# Patient Record
Sex: FEMALE | Race: BLACK OR AFRICAN AMERICAN | NOT HISPANIC OR LATINO
[De-identification: names, ages, dates, MRNs, and addresses within clinical notes are randomized per-mention and may not be internally consistent; named-entity substitution may affect disease eponyms.]

---

## 2018-10-05 ENCOUNTER — APPOINTMENT (OUTPATIENT)
Dept: UROGYNECOLOGY | Facility: CLINIC | Age: 43
End: 2018-10-05
Payer: COMMERCIAL

## 2018-10-05 VITALS — HEART RATE: 74 BPM | WEIGHT: 204 LBS | HEIGHT: 70 IN | BODY MASS INDEX: 29.2 KG/M2

## 2018-10-05 DIAGNOSIS — N39.41 URGE INCONTINENCE: ICD-10-CM

## 2018-10-05 DIAGNOSIS — N39.46 MIXED INCONTINENCE: ICD-10-CM

## 2018-10-05 DIAGNOSIS — Z81.8 FAMILY HISTORY OF OTHER MENTAL AND BEHAVIORAL DISORDERS: ICD-10-CM

## 2018-10-05 DIAGNOSIS — L98.8 OTHER SPECIFIED DISORDERS OF THE SKIN AND SUBCUTANEOUS TISSUE: ICD-10-CM

## 2018-10-05 DIAGNOSIS — N81.89 OTHER FEMALE GENITAL PROLAPSE: ICD-10-CM

## 2018-10-05 DIAGNOSIS — R15.9 FULL INCONTINENCE OF FECES: ICD-10-CM

## 2018-10-05 DIAGNOSIS — N39.3 STRESS INCONTINENCE (FEMALE) (MALE): ICD-10-CM

## 2018-10-05 LAB
BILIRUB UR QL STRIP: NORMAL
CLARITY UR: CLEAR
COLLECTION METHOD: NORMAL
GLUCOSE UR-MCNC: NORMAL
HCG UR QL: 0.2 EU/DL
HGB UR QL STRIP.AUTO: NORMAL
KETONES UR-MCNC: NORMAL
LEUKOCYTE ESTERASE UR QL STRIP: NORMAL
NITRITE UR QL STRIP: NORMAL
PH UR STRIP: 6
PROT UR STRIP-MCNC: NORMAL
SP GR UR STRIP: 1.02

## 2018-10-05 PROCEDURE — 99204 OFFICE O/P NEW MOD 45 MIN: CPT | Mod: 25

## 2018-10-05 PROCEDURE — 51701 INSERT BLADDER CATHETER: CPT

## 2018-10-08 LAB — BACTERIA UR CULT: ABNORMAL

## 2019-01-09 ENCOUNTER — APPOINTMENT (OUTPATIENT)
Dept: UROGYNECOLOGY | Facility: CLINIC | Age: 44
End: 2019-01-09

## 2019-03-06 ENCOUNTER — APPOINTMENT (OUTPATIENT)
Dept: UROGYNECOLOGY | Facility: CLINIC | Age: 44
End: 2019-03-06

## 2019-05-07 ENCOUNTER — TRANSCRIPTION ENCOUNTER (OUTPATIENT)
Age: 44
End: 2019-05-07

## 2019-05-08 ENCOUNTER — APPOINTMENT (OUTPATIENT)
Dept: ORTHOPEDIC SURGERY | Facility: CLINIC | Age: 44
End: 2019-05-08
Payer: COMMERCIAL

## 2019-05-08 VITALS — HEIGHT: 70 IN | BODY MASS INDEX: 30.78 KG/M2 | WEIGHT: 215 LBS

## 2019-05-08 PROCEDURE — 99204 OFFICE O/P NEW MOD 45 MIN: CPT

## 2019-05-08 NOTE — DISCUSSION/SUMMARY
[de-identified] : Treatment options were discussed for the patient's right ankle condition(s) with the patient. The patient agrees to proceed with the present form of treatment. The patient had an opportunity to ask their questions and they were answered to the best of my ability. The risks, complications, benefits and alternatives of proceeding and not proceeding with the proposed treatment plan were discussed.\par The risks, benefits, complications and alternatives of the medication(s) prescribed and/or administered were extensively discussed.\par Cam boot 3 wks\par ice\par The patient was told that if the symptoms/problem returned, did not improve or worsened then to come back and see me.\par

## 2019-05-08 NOTE — PHYSICAL EXAM
[de-identified] : Right ankle/foot examination showed no evidence of warmth or swelling. There was tenderness on palpation laterally. There was a negative anterior drawer test. There was 5/5 strength in dorsiflexion, plantarflexion, inversion and eversion. Active range of motion showed dorsiflexion 20°, plantar flexion 50°, inversion 35° and eversion and 15°.\par \par  [de-identified] : Urgent care x-rays from 5/7/2019 showed no evidence of fracture or dislocation

## 2019-05-08 NOTE — HISTORY OF PRESENT ILLNESS
[FreeTextEntry1] : Location: right ankle\par Quality: Sharp\par Duration:4/15/2019\par Context: Stepped incorrectly and twisted ankle\par Aggravating Factors: walking,weightbearing, getting out of bed, ROM\par Alleviating Factors: Ace bandage, rest\par Associated Symptoms: swelling radiating up leg\par Prior Studies:xray 5/7/2019\par

## 2019-06-10 ENCOUNTER — APPOINTMENT (OUTPATIENT)
Dept: ORTHOPEDIC SURGERY | Facility: CLINIC | Age: 44
End: 2019-06-10
Payer: COMMERCIAL

## 2019-06-10 VITALS — WEIGHT: 215 LBS | BODY MASS INDEX: 30.78 KG/M2 | HEIGHT: 70 IN

## 2019-06-10 DIAGNOSIS — M25.571 PAIN IN RIGHT ANKLE AND JOINTS OF RIGHT FOOT: ICD-10-CM

## 2019-06-10 PROCEDURE — 99213 OFFICE O/P EST LOW 20 MIN: CPT

## 2019-06-10 NOTE — PHYSICAL EXAM
[de-identified] : Right ankle shows mild residual lateral swelling minimal tenderness on palpation. Range of motion is almost equal to the other side. Negative anterior drawer test. She is able to balance on her right foot. She is able to walk on her toes and heels. Neurovascular exam is normal. 5/5 strength

## 2019-06-10 NOTE — DISCUSSION/SUMMARY
[de-identified] : This patient has persistent pain despite wearing the boot. I recommended some physical therapy. I recommended getting an MRI. I will follow up after the MRI testing and let her results her by telephone.

## 2019-06-17 ENCOUNTER — EMERGENCY (EMERGENCY)
Facility: HOSPITAL | Age: 44
LOS: 1 days | Discharge: HOME | End: 2019-06-17
Attending: EMERGENCY MEDICINE | Admitting: EMERGENCY MEDICINE
Payer: COMMERCIAL

## 2019-06-17 VITALS
HEART RATE: 84 BPM | DIASTOLIC BLOOD PRESSURE: 65 MMHG | TEMPERATURE: 98 F | OXYGEN SATURATION: 100 % | RESPIRATION RATE: 18 BRPM | SYSTOLIC BLOOD PRESSURE: 110 MMHG

## 2019-06-17 VITALS
DIASTOLIC BLOOD PRESSURE: 89 MMHG | OXYGEN SATURATION: 100 % | HEART RATE: 95 BPM | TEMPERATURE: 97 F | RESPIRATION RATE: 18 BRPM | SYSTOLIC BLOOD PRESSURE: 144 MMHG

## 2019-06-17 LAB
ALBUMIN SERPL ELPH-MCNC: 4 G/DL — SIGNIFICANT CHANGE UP (ref 3.3–5)
ALP SERPL-CCNC: 94 U/L — SIGNIFICANT CHANGE UP (ref 40–120)
ALT FLD-CCNC: 87 U/L — HIGH (ref 4–33)
ANION GAP SERPL CALC-SCNC: 12 MMO/L — SIGNIFICANT CHANGE UP (ref 7–14)
APPEARANCE UR: CLEAR — SIGNIFICANT CHANGE UP
AST SERPL-CCNC: 224 U/L — HIGH (ref 4–32)
BILIRUB SERPL-MCNC: 0.5 MG/DL — SIGNIFICANT CHANGE UP (ref 0.2–1.2)
BILIRUB UR-MCNC: NEGATIVE — SIGNIFICANT CHANGE UP
BLOOD UR QL VISUAL: NEGATIVE — SIGNIFICANT CHANGE UP
BUN SERPL-MCNC: 13 MG/DL — SIGNIFICANT CHANGE UP (ref 7–23)
CALCIUM SERPL-MCNC: 9.1 MG/DL — SIGNIFICANT CHANGE UP (ref 8.4–10.5)
CHLORIDE SERPL-SCNC: 104 MMOL/L — SIGNIFICANT CHANGE UP (ref 98–107)
CO2 SERPL-SCNC: 24 MMOL/L — SIGNIFICANT CHANGE UP (ref 22–31)
COLOR SPEC: YELLOW — SIGNIFICANT CHANGE UP
CREAT SERPL-MCNC: 0.76 MG/DL — SIGNIFICANT CHANGE UP (ref 0.5–1.3)
GLUCOSE SERPL-MCNC: 97 MG/DL — SIGNIFICANT CHANGE UP (ref 70–99)
GLUCOSE UR-MCNC: NEGATIVE — SIGNIFICANT CHANGE UP
HCT VFR BLD CALC: 38.5 % — SIGNIFICANT CHANGE UP (ref 34.5–45)
HGB BLD-MCNC: 11.9 G/DL — SIGNIFICANT CHANGE UP (ref 11.5–15.5)
KETONES UR-MCNC: NEGATIVE — SIGNIFICANT CHANGE UP
LEUKOCYTE ESTERASE UR-ACNC: NEGATIVE — SIGNIFICANT CHANGE UP
LIDOCAIN IGE QN: 51.7 U/L — SIGNIFICANT CHANGE UP (ref 7–60)
MCHC RBC-ENTMCNC: 26.3 PG — LOW (ref 27–34)
MCHC RBC-ENTMCNC: 30.9 % — LOW (ref 32–36)
MCV RBC AUTO: 85 FL — SIGNIFICANT CHANGE UP (ref 80–100)
NITRITE UR-MCNC: NEGATIVE — SIGNIFICANT CHANGE UP
NRBC # FLD: 0 K/UL — SIGNIFICANT CHANGE UP (ref 0–0)
PH UR: 7 — SIGNIFICANT CHANGE UP (ref 5–8)
PLATELET # BLD AUTO: 383 K/UL — SIGNIFICANT CHANGE UP (ref 150–400)
PMV BLD: 10.1 FL — SIGNIFICANT CHANGE UP (ref 7–13)
POTASSIUM SERPL-MCNC: 4.5 MMOL/L — SIGNIFICANT CHANGE UP (ref 3.5–5.3)
POTASSIUM SERPL-SCNC: 4.5 MMOL/L — SIGNIFICANT CHANGE UP (ref 3.5–5.3)
PROT SERPL-MCNC: 7.4 G/DL — SIGNIFICANT CHANGE UP (ref 6–8.3)
PROT UR-MCNC: 10 — SIGNIFICANT CHANGE UP
RBC # BLD: 4.53 M/UL — SIGNIFICANT CHANGE UP (ref 3.8–5.2)
RBC # FLD: 14.3 % — SIGNIFICANT CHANGE UP (ref 10.3–14.5)
SODIUM SERPL-SCNC: 140 MMOL/L — SIGNIFICANT CHANGE UP (ref 135–145)
SP GR SPEC: 1.02 — SIGNIFICANT CHANGE UP (ref 1–1.04)
TROPONIN T, HIGH SENSITIVITY: < 6 NG/L — SIGNIFICANT CHANGE UP (ref ?–14)
UROBILINOGEN FLD QL: HIGH
WBC # BLD: 13.09 K/UL — HIGH (ref 3.8–10.5)
WBC # FLD AUTO: 13.09 K/UL — HIGH (ref 3.8–10.5)

## 2019-06-17 PROCEDURE — 99284 EMERGENCY DEPT VISIT MOD MDM: CPT

## 2019-06-17 PROCEDURE — 71046 X-RAY EXAM CHEST 2 VIEWS: CPT | Mod: 26

## 2019-06-17 RX ORDER — SODIUM CHLORIDE 9 MG/ML
1000 INJECTION INTRAMUSCULAR; INTRAVENOUS; SUBCUTANEOUS ONCE
Refills: 0 | Status: COMPLETED | OUTPATIENT
Start: 2019-06-17 | End: 2019-06-17

## 2019-06-17 RX ORDER — FAMOTIDINE 10 MG/ML
1 INJECTION INTRAVENOUS
Qty: 30 | Refills: 0
Start: 2019-06-17

## 2019-06-17 RX ORDER — ONDANSETRON 8 MG/1
4 TABLET, FILM COATED ORAL ONCE
Refills: 0 | Status: COMPLETED | OUTPATIENT
Start: 2019-06-17 | End: 2019-06-17

## 2019-06-17 RX ADMIN — ONDANSETRON 4 MILLIGRAM(S): 8 TABLET, FILM COATED ORAL at 12:53

## 2019-06-17 RX ADMIN — SODIUM CHLORIDE 1000 MILLILITER(S): 9 INJECTION INTRAMUSCULAR; INTRAVENOUS; SUBCUTANEOUS at 12:53

## 2019-06-17 NOTE — ED PROVIDER NOTE - CLINICAL SUMMARY MEDICAL DECISION MAKING FREE TEXT BOX
Azalia: chest pain, not likely PE by hx. will evaluate for cardiac but story atypical. aorta not likely. vomiting makes GI cause most likely. Will reassess.

## 2019-06-17 NOTE — ED ADULT NURSE NOTE - OBJECTIVE STATEMENT
Pt presents to ED with chest pain, nausea/vomiting/shortness of breath. pt states she was at work yesterday and started not feeling well. Pt woke up this morning with vomiting and chest pain that caused her to be short of breath. pt states she is having pain under the left ribs. Pt denies other complaints at this time. pt AxOx3, ambulatory at baseline. 22G IVL placed in the L hand. Will continue to monitor.

## 2019-06-17 NOTE — ED PROVIDER NOTE - OBJECTIVE STATEMENT
Patient with 8/10 chest pain which was across both sides of chest and radiating to back when she awoke. Patient then started vomiting which decreased the pain somewhat. Patient was diaphoretic and sob during this episode. Patient now with 4-5/10 chest pain without radiation.  Patient with no previous chest pain. Patient had bee having bloating and early satiety for weeks.  Patient denies dark stool or blood in vomit. Patient with 8/10 chest pain which was across both sides of chest and radiating to back when she awoke. Patient then started vomiting which decreased the pain somewhat. Patient was diaphoretic and sob during this episode. Patient now with 4-5/10 chest pain without radiation.  Patient with no previous chest pain. Patient had been having bloating and early satiety for weeks.  Patient denies dark stool or blood in vomit.

## 2021-07-05 ENCOUNTER — EMERGENCY (EMERGENCY)
Facility: HOSPITAL | Age: 46
LOS: 1 days | Discharge: HOME | End: 2021-07-05
Attending: EMERGENCY MEDICINE | Admitting: EMERGENCY MEDICINE
Payer: COMMERCIAL

## 2021-07-05 VITALS
TEMPERATURE: 98 F | DIASTOLIC BLOOD PRESSURE: 86 MMHG | HEART RATE: 89 BPM | SYSTOLIC BLOOD PRESSURE: 120 MMHG | RESPIRATION RATE: 18 BRPM | OXYGEN SATURATION: 100 %

## 2021-07-05 VITALS
HEART RATE: 81 BPM | DIASTOLIC BLOOD PRESSURE: 64 MMHG | OXYGEN SATURATION: 100 % | RESPIRATION RATE: 17 BRPM | SYSTOLIC BLOOD PRESSURE: 122 MMHG

## 2021-07-05 LAB
C3 SERPL-MCNC: 179 MG/DL — SIGNIFICANT CHANGE UP (ref 90–180)
C4 SERPL-MCNC: 45 MG/DL — HIGH (ref 10–40)
CRP SERPL-MCNC: 31.3 MG/L — HIGH

## 2021-07-05 PROCEDURE — 99284 EMERGENCY DEPT VISIT MOD MDM: CPT

## 2021-07-05 RX ORDER — EPINEPHRINE 0.3 MG/.3ML
0.3 INJECTION INTRAMUSCULAR; SUBCUTANEOUS ONCE
Refills: 0 | Status: COMPLETED | OUTPATIENT
Start: 2021-07-05 | End: 2021-07-05

## 2021-07-05 RX ORDER — ALBUTEROL 90 UG/1
1 AEROSOL, METERED ORAL ONCE
Refills: 0 | Status: COMPLETED | OUTPATIENT
Start: 2021-07-05 | End: 2021-07-05

## 2021-07-05 RX ADMIN — Medication 40 MILLIGRAM(S): at 15:21

## 2021-07-05 RX ADMIN — EPINEPHRINE 0.3 MILLIGRAM(S): 0.3 INJECTION INTRAMUSCULAR; SUBCUTANEOUS at 15:21

## 2021-07-05 RX ADMIN — ALBUTEROL 1 PUFF(S): 90 AEROSOL, METERED ORAL at 15:21

## 2021-07-05 NOTE — ED PROVIDER NOTE - RESPIRATORY [-], MLM
no SOB, no stridor/no wheezing/no exertional dyspnea/no hemoptysis/no orthopnea/no shortness of breath

## 2021-07-05 NOTE — ED PROVIDER NOTE - ENMT, MLM
Positive for upper lip edema  Airway patent, Nasal mucosa clear. Mouth with normal mucosa. Throat has no vesicles, no oropharyngeal exudates and uvula is midline. Normal phonation, no drooling, no difficulty swallowing.

## 2021-07-05 NOTE — ED PROVIDER NOTE - PROGRESS NOTE DETAILS
Cindy Perez: Continues to be in no respiratory distress. No change in lip swelling. No new complaints.

## 2021-07-05 NOTE — ED PROVIDER NOTE - NSFOLLOWUPINSTRUCTIONS_ED_ALL_ED_FT
Take these medications:  - Claritin daily  - Prednisone 50mg (1tab) once a day  - Ventolin inhaler as needed  - Epipen as needed    Please return to the Emergency Department if you experience shortness of breath, difficulty swallowing, difficulty speaking, throat swelling, lightheadedness or any worsening symptoms. Take these medications:  - Claritin daily  - Prednisone 50mg (1tab) once a day  - Ventolin inhaler as needed  - Epipen as needed    You will receive a call with the results of your blood tests.    Please return to the Emergency Department if you experience shortness of breath, difficulty swallowing, difficulty speaking, throat swelling, lightheadedness or any worsening symptoms. Take these medications:  - Claritin daily  - Benadryl 25 to 50mg as needed  - Prednisone 50mg (1tab) once a day  - Ventolin inhaler as needed, use spacer as instructed  - Epipen as needed, in case of Emergency, difficulty breathing or fainting    You will receive a call with the results of your blood tests, or you can call to check on results.    Please return to the Emergency Department if you experience any worsening symptoms including shortness of breath, difficulty swallowing, difficulty speaking, throat swelling, lightheadedness or any signs of distress.

## 2021-07-05 NOTE — ED PROVIDER NOTE - OBJECTIVE STATEMENT
44yo F presenting with upper lip swelling from 10pm last night. Tried benadryl with no improvement and woke up with worsening swelling this AM. Has occasional dry cough. Pt has no SOB, difficulty with speaking or swallowing, no rash. Has no throat irritation or swelling. Pt had similar symptoms starting 4-6 weeks ago and episodes have become more frequent. Typically has lower or upper lip swelling with no other symptoms and onset is sporadic, lasting around 24hours. Pt cannot identify triggers, with episodes happening after eating but other times while sitting. She has tried benadryl with no improvement. On June 2, she had lip swelling with mild SOB and throat tingling that prompted her to go to Urgent Care. Decadron, prednisone, and benadryl were given with no significant improvement. Pt does not take anti hypertensive medications or have known food allergies. Allergy testing done recently with no positive results, given epipen. 44yo F presenting with upper lip swelling from 10pm last night. Tried benadryl with no improvement and woke up with worsening swelling this AM. Has occasional dry cough. Pt has no SOB, difficulty with speaking or swallowing, no rash. Has no throat irritation or swelling. Pt had similar symptoms starting 4-6 weeks ago and episodes have become more frequent. Typically has lower or upper lip swelling, onset is sporadic, lasting around 24hours. Pt cannot identify triggers, with episodes happening after eating but other times while sitting. She has tried benadryl with no improvement. On June 2, she had lip swelling with mild SOB and throat tingling that prompted her to go to Urgent Care. Decadron, prednisone, and benadryl were given with no significant improvement. Pt does not take anti hypertensive medications or have known food allergies. Allergy testing done recently with no positive results, given epipen. 44yo F presenting with upper lip swelling from 10pm last night. Tried benadryl with no improvement and woke up with worsening swelling this AM. Has occasional dry cough. Pt has no SOB, difficulty with speaking or swallowing, no rash. Has no throat irritation or swelling. Pt had similar symptoms starting 4-6 weeks ago and episodes have become more frequent. Typically has lower or upper lip swelling, onset is sporadic, lasting around 24hours. Pt cannot identify triggers, with episodes happening after eating but other times while sitting. She has tried benadryl with no improvement. On June 2, she had lip swelling with mild SOB and throat tingling that prompted her to go to Urgent Care. Decadron, prednisone, and benadryl were given with no significant improvement. Pt does not take anti hypertensive medications or have known food allergies. Allergy testing done recently with no positive results, given epipen for home.

## 2021-07-05 NOTE — ED PROVIDER NOTE - ATTENDING CONTRIBUTION TO CARE
f Seen and examined, c/o waxing and waning lip swelling for 5-6 wks., worse in past week, saw urgent[facility] due to persistent lip swelling and throat scratching, denies resp. distress, no diff. swallowing/speaking, states had had episodes of SOB over past few wks. assoc. with cough, exertion, and bedtime awakening, but currently only has cough and not SOB. No change in voice, no tongue c/o, no rashes, no itching, no hx of prev. allergic rxn. States had skin testing neg. and takes no BP medications. Upper lip >> lower lip, sl. asymmetric, normal tongue, no drooling, no pooling, no stridor, no floor of mouth elevation, no ant. neck LNs, clear lungs, shallow resp, inc. dry cough w inspiration, abd soft, NT to palp, no edema, NT calves, good pulses.

## 2021-07-05 NOTE — ED ADULT TRIAGE NOTE - CHIEF COMPLAINT QUOTE
states" I am having swelling to my upper lip since last night since 10 pm" denies any trouble swallowing. speaking clearly in full sentences. patient took benadryl at 11pm with no relief. as per patient this is been happening since 4 months and was treated with steroids. denies use of any other meds/history.

## 2021-07-05 NOTE — ED PROVIDER NOTE - CARDIAC, MLM
Bilateral LE edema. Normal rate, regular rhythm.  Heart sounds S1, S2.  No murmurs, rubs or gallops.

## 2021-07-05 NOTE — ED PROVIDER NOTE - CLINICAL SUMMARY MEDICAL DECISION MAKING FREE TEXT BOX
44yo F w/ no PMH coming with 4-6w of intermittent lip swelling. On exam, airway is patent, no stridor, no rash, no SOB, no trouble speaking or swallowing. + upper lip edema. Neg allergy testing recently. No improvement with benadryl. No identifiable food allergies, no new detergent, makeup or face lotion uses. 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 Will consider sending immunology lab work for hereditary angioedema. Will trial standing antihistamine/loratidine and food diary. Will educate patient on epipen use which she has already. Will refer to immunology. Will observe in ED for airway compromise. 46yo F w/ no PMH coming with 4-6w of intermittent lip swelling. On exam, airway is patent, no stridor, no rash, no SOB, no trouble speaking or swallowing. + upper lip edema. Neg allergy testing recently. No improvement with benadryl. No identifiable food allergies, no new detergent, makeup or face lotion uses. 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 Will consider sending immunology lab work for hereditary angioedema. Will trial standing antihistamine and food diary. Will educate patient on epipen use which she has already. Will observe in ED for airway compromise. 46yo F w/ no PMH coming with 4-6w of intermittent lip swelling. On exam, airway is patent, no stridor, no rash, no SOB, no trouble speaking or swallowing. + upper lip edema. Neg allergy testing recently. No improvement with benadryl. No identifiable food allergies, no new detergent, makeup or face lotion uses. 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 Will consider sending immunology lab work for hereditary angioedema. Will trial standing antihistamine and food diary. Will educate patient on epipen use which she has already. Will observe in ED for worsening resp. symptoms.

## 2021-07-05 NOTE — ED ADULT NURSE NOTE - CHPI ED NUR SYMPTOMS NEG
no congestion/no difficulty breathing/no difficulty swallowing/no nausea/no rash/no shortness of breath/no throat itching/no vomiting/no wheezing

## 2021-07-05 NOTE — ED ADULT NURSE NOTE - OBJECTIVE STATEMENT
Pt alert x4 c/c upper lip angioedema starting 10pm last night. Pt endorses lip swelling has been occurring intermittently 1xmonth. followed up with PCP and allergist. Pt took 50mg benadryl 2x with no relief. Airway patent, pt speaking gfull sentences, able to swallow secretions. denies SOB. breath sounds clear. Safety education reinforced with call bell within reach. Patient verbalizes understanding of use. Will continue to monitor.

## 2021-07-07 LAB — C1INH FUNCTIONAL/C1INH TOTAL MFR SERPL: 43 MG/DL — HIGH (ref 21–39)

## 2021-07-12 NOTE — ED POST DISCHARGE NOTE - RESULT SUMMARY
CARMELA Patiño: C1 esterase, C4, and CRP elevated, pt worked up for hereditary angioedema. Pt called back, informed of results, requested we send to her allergist Dr. Osman, results faxed to [phone]

## 2021-07-24 ENCOUNTER — INPATIENT (INPATIENT)
Facility: HOSPITAL | Age: 46
LOS: 6 days | Discharge: HOME | End: 2021-07-31
Attending: INTERNAL MEDICINE | Admitting: INTERNAL MEDICINE
Payer: COMMERCIAL

## 2021-07-24 VITALS
DIASTOLIC BLOOD PRESSURE: 91 MMHG | OXYGEN SATURATION: 100 % | SYSTOLIC BLOOD PRESSURE: 134 MMHG | RESPIRATION RATE: 19 BRPM | TEMPERATURE: 99 F | HEART RATE: 100 BPM

## 2021-07-24 DIAGNOSIS — I80.10 PHLEBITIS AND THROMBOPHLEBITIS OF UNSPECIFIED FEMORAL VEIN: ICD-10-CM

## 2021-07-24 DIAGNOSIS — I26.99 OTHER PULMONARY EMBOLISM WITHOUT ACUTE COR PULMONALE: ICD-10-CM

## 2021-07-24 LAB
24R-OH-CALCIDIOL SERPL-MCNC: 29.8 NG/ML — LOW (ref 30–80)
ALBUMIN SERPL ELPH-MCNC: 3.9 G/DL — SIGNIFICANT CHANGE UP (ref 3.3–5)
ALP SERPL-CCNC: 90 U/L — SIGNIFICANT CHANGE UP (ref 40–120)
ALT FLD-CCNC: 11 U/L — SIGNIFICANT CHANGE UP (ref 4–33)
ANION GAP SERPL CALC-SCNC: 13 MMOL/L — SIGNIFICANT CHANGE UP (ref 7–14)
APPEARANCE UR: CLEAR — SIGNIFICANT CHANGE UP
AST SERPL-CCNC: 14 U/L — SIGNIFICANT CHANGE UP (ref 4–32)
B PERT DNA SPEC QL NAA+PROBE: SIGNIFICANT CHANGE UP
BASOPHILS # BLD AUTO: 0.07 K/UL — SIGNIFICANT CHANGE UP (ref 0–0.2)
BASOPHILS NFR BLD AUTO: 0.9 % — SIGNIFICANT CHANGE UP (ref 0–2)
BILIRUB SERPL-MCNC: <0.2 MG/DL — SIGNIFICANT CHANGE UP (ref 0.2–1.2)
BILIRUB UR-MCNC: NEGATIVE — SIGNIFICANT CHANGE UP
BUN SERPL-MCNC: 10 MG/DL — SIGNIFICANT CHANGE UP (ref 7–23)
C PNEUM DNA SPEC QL NAA+PROBE: SIGNIFICANT CHANGE UP
CALCIUM SERPL-MCNC: 8.9 MG/DL — SIGNIFICANT CHANGE UP (ref 8.4–10.5)
CHLORIDE SERPL-SCNC: 105 MMOL/L — SIGNIFICANT CHANGE UP (ref 98–107)
CO2 SERPL-SCNC: 20 MMOL/L — LOW (ref 22–31)
COLOR SPEC: ABNORMAL
CREAT SERPL-MCNC: 0.85 MG/DL — SIGNIFICANT CHANGE UP (ref 0.5–1.3)
D DIMER BLD IA.RAPID-MCNC: 1308 NG/ML DDU — HIGH
DIFF PNL FLD: ABNORMAL
DSDNA AB FLD-ACNC: 0.2 AI — SIGNIFICANT CHANGE UP
ENA SS-A AB FLD IA-ACNC: <0.2 AI — SIGNIFICANT CHANGE UP
EOSINOPHIL # BLD AUTO: 0.3 K/UL — SIGNIFICANT CHANGE UP (ref 0–0.5)
EOSINOPHIL NFR BLD AUTO: 3.8 % — SIGNIFICANT CHANGE UP (ref 0–6)
FLUAV SUBTYP SPEC NAA+PROBE: SIGNIFICANT CHANGE UP
FLUBV RNA SPEC QL NAA+PROBE: SIGNIFICANT CHANGE UP
GLUCOSE SERPL-MCNC: 122 MG/DL — HIGH (ref 70–99)
GLUCOSE UR QL: NEGATIVE — SIGNIFICANT CHANGE UP
HADV DNA SPEC QL NAA+PROBE: SIGNIFICANT CHANGE UP
HCOV 229E RNA SPEC QL NAA+PROBE: SIGNIFICANT CHANGE UP
HCOV HKU1 RNA SPEC QL NAA+PROBE: SIGNIFICANT CHANGE UP
HCOV NL63 RNA SPEC QL NAA+PROBE: SIGNIFICANT CHANGE UP
HCOV OC43 RNA SPEC QL NAA+PROBE: SIGNIFICANT CHANGE UP
HCT VFR BLD CALC: 34 % — LOW (ref 34.5–45)
HGB BLD-MCNC: 10.4 G/DL — LOW (ref 11.5–15.5)
HMPV RNA SPEC QL NAA+PROBE: SIGNIFICANT CHANGE UP
HPIV1 RNA SPEC QL NAA+PROBE: SIGNIFICANT CHANGE UP
HPIV2 RNA SPEC QL NAA+PROBE: SIGNIFICANT CHANGE UP
HPIV3 RNA SPEC QL NAA+PROBE: SIGNIFICANT CHANGE UP
HPIV4 RNA SPEC QL NAA+PROBE: SIGNIFICANT CHANGE UP
IANC: 4.77 K/UL — SIGNIFICANT CHANGE UP (ref 1.5–8.5)
IMM GRANULOCYTES NFR BLD AUTO: 0.4 % — SIGNIFICANT CHANGE UP (ref 0–1.5)
KETONES UR-MCNC: NEGATIVE — SIGNIFICANT CHANGE UP
LEUKOCYTE ESTERASE UR-ACNC: ABNORMAL
LUPUS ANTICOAGULANT PROFILE RESULT: SIGNIFICANT CHANGE UP
LUPUS ANTICOAGULANT PROFILE RESULT: SIGNIFICANT CHANGE UP
LYMPHOCYTES # BLD AUTO: 2.11 K/UL — SIGNIFICANT CHANGE UP (ref 1–3.3)
LYMPHOCYTES # BLD AUTO: 27 % — SIGNIFICANT CHANGE UP (ref 13–44)
MAGNESIUM SERPL-MCNC: 1.7 MG/DL — SIGNIFICANT CHANGE UP (ref 1.6–2.6)
MCHC RBC-ENTMCNC: 24.2 PG — LOW (ref 27–34)
MCHC RBC-ENTMCNC: 30.6 GM/DL — LOW (ref 32–36)
MCV RBC AUTO: 79.1 FL — LOW (ref 80–100)
MONOCYTES # BLD AUTO: 0.53 K/UL — SIGNIFICANT CHANGE UP (ref 0–0.9)
MONOCYTES NFR BLD AUTO: 6.8 % — SIGNIFICANT CHANGE UP (ref 2–14)
NEUTROPHILS # BLD AUTO: 4.77 K/UL — SIGNIFICANT CHANGE UP (ref 1.8–7.4)
NEUTROPHILS NFR BLD AUTO: 61.1 % — SIGNIFICANT CHANGE UP (ref 43–77)
NITRITE UR-MCNC: NEGATIVE — SIGNIFICANT CHANGE UP
NRBC # BLD: 0 /100 WBCS — SIGNIFICANT CHANGE UP
NRBC # FLD: 0 K/UL — SIGNIFICANT CHANGE UP
NT-PROBNP SERPL-SCNC: 31 PG/ML — SIGNIFICANT CHANGE UP
PH UR: 6.5 — SIGNIFICANT CHANGE UP (ref 5–8)
PHOSPHATE SERPL-MCNC: 3.6 MG/DL — SIGNIFICANT CHANGE UP (ref 2.5–4.5)
PLATELET # BLD AUTO: 406 K/UL — HIGH (ref 150–400)
POTASSIUM SERPL-MCNC: 4.5 MMOL/L — SIGNIFICANT CHANGE UP (ref 3.5–5.3)
POTASSIUM SERPL-SCNC: 4.5 MMOL/L — SIGNIFICANT CHANGE UP (ref 3.5–5.3)
PROT SERPL-MCNC: 7.1 G/DL — SIGNIFICANT CHANGE UP (ref 6–8.3)
PROT UR-MCNC: ABNORMAL
RAPID RVP RESULT: SIGNIFICANT CHANGE UP
RBC # BLD: 4.3 M/UL — SIGNIFICANT CHANGE UP (ref 3.8–5.2)
RBC # FLD: 15.2 % — HIGH (ref 10.3–14.5)
RSV RNA SPEC QL NAA+PROBE: SIGNIFICANT CHANGE UP
RV+EV RNA SPEC QL NAA+PROBE: SIGNIFICANT CHANGE UP
SARS-COV-2 RNA SPEC QL NAA+PROBE: SIGNIFICANT CHANGE UP
SODIUM SERPL-SCNC: 138 MMOL/L — SIGNIFICANT CHANGE UP (ref 135–145)
SP GR SPEC: 1.01 — SIGNIFICANT CHANGE UP (ref 1.01–1.02)
TROPONIN T, HIGH SENSITIVITY RESULT: <6 NG/L — SIGNIFICANT CHANGE UP
TSH SERPL-MCNC: 2.78 UIU/ML — SIGNIFICANT CHANGE UP (ref 0.27–4.2)
UROBILINOGEN FLD QL: SIGNIFICANT CHANGE UP
WBC # BLD: 7.81 K/UL — SIGNIFICANT CHANGE UP (ref 3.8–10.5)
WBC # FLD AUTO: 7.81 K/UL — SIGNIFICANT CHANGE UP (ref 3.8–10.5)

## 2021-07-24 PROCEDURE — 93970 EXTREMITY STUDY: CPT | Mod: 26

## 2021-07-24 PROCEDURE — 71275 CT ANGIOGRAPHY CHEST: CPT | Mod: 26

## 2021-07-24 PROCEDURE — 71046 X-RAY EXAM CHEST 2 VIEWS: CPT | Mod: 26

## 2021-07-24 PROCEDURE — 93010 ELECTROCARDIOGRAM REPORT: CPT

## 2021-07-24 PROCEDURE — 99285 EMERGENCY DEPT VISIT HI MDM: CPT | Mod: 25

## 2021-07-24 RX ORDER — IBUPROFEN 200 MG
400 TABLET ORAL ONCE
Refills: 0 | Status: COMPLETED | OUTPATIENT
Start: 2021-07-24 | End: 2021-07-24

## 2021-07-24 RX ORDER — PANTOPRAZOLE SODIUM 20 MG/1
40 TABLET, DELAYED RELEASE ORAL
Refills: 0 | Status: DISCONTINUED | OUTPATIENT
Start: 2021-07-24 | End: 2021-07-31

## 2021-07-24 RX ORDER — HEPARIN SODIUM 5000 [USP'U]/ML
4000 INJECTION INTRAVENOUS; SUBCUTANEOUS EVERY 6 HOURS
Refills: 0 | Status: DISCONTINUED | OUTPATIENT
Start: 2021-07-24 | End: 2021-07-25

## 2021-07-24 RX ORDER — APIXABAN 2.5 MG/1
1 TABLET, FILM COATED ORAL
Qty: 60 | Refills: 0
Start: 2021-07-24 | End: 2021-08-22

## 2021-07-24 RX ORDER — ACETAMINOPHEN 500 MG
975 TABLET ORAL ONCE
Refills: 0 | Status: COMPLETED | OUTPATIENT
Start: 2021-07-24 | End: 2021-07-24

## 2021-07-24 RX ORDER — APIXABAN 2.5 MG/1
10 TABLET, FILM COATED ORAL
Refills: 0 | Status: DISCONTINUED | OUTPATIENT
Start: 2021-07-24 | End: 2021-07-24

## 2021-07-24 RX ORDER — HEPARIN SODIUM 5000 [USP'U]/ML
INJECTION INTRAVENOUS; SUBCUTANEOUS
Qty: 25000 | Refills: 0 | Status: DISCONTINUED | OUTPATIENT
Start: 2021-07-24 | End: 2021-07-25

## 2021-07-24 RX ORDER — APIXABAN 2.5 MG/1
10 TABLET, FILM COATED ORAL ONCE
Refills: 0 | Status: COMPLETED | OUTPATIENT
Start: 2021-07-24 | End: 2021-07-24

## 2021-07-24 RX ORDER — HEPARIN SODIUM 5000 [USP'U]/ML
9000 INJECTION INTRAVENOUS; SUBCUTANEOUS ONCE
Refills: 0 | Status: COMPLETED | OUTPATIENT
Start: 2021-07-24 | End: 2021-07-24

## 2021-07-24 RX ORDER — HEPARIN SODIUM 5000 [USP'U]/ML
9000 INJECTION INTRAVENOUS; SUBCUTANEOUS EVERY 6 HOURS
Refills: 0 | Status: DISCONTINUED | OUTPATIENT
Start: 2021-07-24 | End: 2021-07-25

## 2021-07-24 RX ADMIN — APIXABAN 10 MILLIGRAM(S): 2.5 TABLET, FILM COATED ORAL at 10:20

## 2021-07-24 RX ADMIN — Medication 400 MILLIGRAM(S): at 15:06

## 2021-07-24 RX ADMIN — Medication 400 MILLIGRAM(S): at 15:40

## 2021-07-24 RX ADMIN — HEPARIN SODIUM 1900 UNIT(S)/HR: 5000 INJECTION INTRAVENOUS; SUBCUTANEOUS at 19:25

## 2021-07-24 RX ADMIN — Medication 975 MILLIGRAM(S): at 08:58

## 2021-07-24 RX ADMIN — HEPARIN SODIUM 9000 UNIT(S): 5000 INJECTION INTRAVENOUS; SUBCUTANEOUS at 19:25

## 2021-07-24 NOTE — CONSULT NOTE ADULT - SUBJECTIVE AND OBJECTIVE BOX
PATIENT SEEN AND EXAMINED ON :-07/24/2021  DATE OF SERVICE:     07/24/2021   Interim events noted,Labs ,Radiological studies and Cardiology tests reviewed .    History of Present Illness:   46 yo F w/ sig PmHx p/w ~3 months of intermittent partial lip swelling, leg swelling, nonproductive cough, and SIERRA. Pt denies CP, palp, skin changes, rashes. She does note ~60 lb weight gain but attributes that to lifestyle changes during the pandemic. Pt has been previously seen by an allergist. Pt stated that she is planning to follow up with a cardiologist.  pt had recent flight to [place] one week ago , no cough , sob on exertion during the trip , been having shortness of breath on exertion associated with orthopnea, pnd past few days after her return from [place]  npo fever , took covid vaccine in feb , started to have cough , intermittent shortness of breath since may 2021, check for covid multiple times in last couple of weak was told neg   pt been allergy / immunology recently - unclear w/u  no miscarriages , no rash , no h/o heavy menstural bleeding, no h/o gi bleed , never received transfusion  family hx of " blood clots"         +REVIEW OF SYSTEMS:   Review of Systems:  · CARDIOVASCULAR: - - -  · Cardiovascular [+]: PERIPHERAL EDEMA  · RESPIRATORY: - - -  · Respiratory [+]: COUGH, EXERTIONAL DYSPNEA, ORTHOPNEA  · ROS STATEMENT: all other ROS negative except as per HPI        ALLERGIES AND HOME MEDICATIONS:   Allergies:        Allergies:  	erythromycin: Drug, Hives  	macrolide antibiotics: Drug Category, Other, Originally entered in [facility] system as: Unreported Reaction to MACROLIDE IMMUNOSUPPRESSANT, Unreported Reaction to MACROLIDE ANTIBIOTICS    Home Medications:   * Patient Currently Takes Medications as of 05-Jul-2021 15:16 documented in Structured Notes  · 	predniSONE 50 mg oral tablet: 1 tab(s) orally once a day   · 	Pepcid 20 mg oral tablet: 1 tab(s) orally once a day     Social History:  Social History (marital status, living situation, occupation, tobacco use, alcohol and drug use, and sexual history): denies alcohol use, smoking        PHYSICAL EXAM:   · CONSTITUTIONAL: Well appearing, well nourished, awake, alert, oriented to person, place, time/situation and in no apparent distress.  · ENMT: Airway patent, Nasal mucosa clear. Mouth with normal mucosa. Throat has no vesicles, no oropharyngeal exudates and uvula is midline.  · EYES: Clear bilaterally, pupils equal, round and reactive to light.  · CARDIAC: - - -  · CARDIAC RHYTHM: regular   · CARDIAC RATE: normal  · CARDIAC SOUNDS: S1-S2  · CARDIAC MURMUR: 2/6 systolic  murmur   · CARDIAC PEDAL EDEMA: absent   · CARDIAC CAPI REFILL: less than or equal to 2 seconds   · CARDIAC JVD: non-distended bilaterally   · RESPIRATORY: Breath sounds clear and equal bilaterally.  · GASTROINTESTINAL: - - -  · Abdominal Exam: soft  · Abdominal Tenderness Location: non tender  · Bowel Sounds: present x 4 quadrants  · GENITOURINARY: No discharge, lesions.  · MUSCULOSKELETAL: Spine appears normal, range of motion is not limited, no muscle or joint tenderness  · NEUROLOGICAL: Alert and oriented, no focal deficits, no motor or sensory deficits.  · SKIN: Skin normal color for race, warm, dry and intact. No evidence of rash.      LAB:    Comprehensive Metabolic Panel (07.24.21 @ 08:33)   Sodium, Serum: 138 mmol/L   Potassium, Serum: 4.5: SPECIMEN MILDLY HEMOLYZED mmol/L   Chloride, Serum: 105 mmol/L   Carbon Dioxide, Serum: 20 mmol/L   Anion Gap, Serum: 13 mmol/L   Blood Urea Nitrogen, Serum: 10 mg/dL   Creatinine, Serum: 0.85 mg/dL   Glucose, Serum: 122 mg/dL   Calcium, Total Serum: 8.9 mg/dL   Complete Blood Count + Automated Diff (07.24.21 @ 08:33)   WBC Count: 7.81 K/uL   RBC Count: 4.30 M/uL   Hemoglobin: 10.4 g/dL   Hematocrit: 34.0 %   Mean Cell Volume: 79.1 fL   Mean Cell Hemoglobin: 24.2 pg   Mean Cell Hemoglobin Conc: 30.6 gm/dL   Red Cell Distrib Width: 15.2 %   Platelet Count - Automated: 406 K/uL     D-Dimer Assay, Quantitative: 1308    Troponin T, High Sensitivity Result: <6:    EXAM:  CT ANGIO CHEST PULM ART WAWIC    IMPRESSION:  Multiple bilateral segmental and subsegmental pulmonary emboli.Ventricular septal flattening may represent right heart strain. Recommend further evaluation with echocardiogram if clinically warranted.        EXAM:  US DPLX LWR EXT VEINS COMPL BI    IMPRESSION:  Acute above-the-knee left popliteal vein thrombus.                                 PATIENT SEEN AND EXAMINED ON :-07/24/2021  DATE OF SERVICE:     07/24/2021   Interim events noted,Labs ,Radiological studies and Cardiology tests reviewed .    History of Present Illness:   44 yo F w/ sig PmHx p/w ~3 months of intermittent partial lip swelling, leg swelling, nonproductive cough, and SIERRA. Pt denies CP, palp, skin changes, rashes. She does note ~60 lb weight gain but attributes that to lifestyle changes during the pandemic. Pt has been previously seen by an allergist. Pt stated that she is planning to follow up with a cardiologist.  pt had recent flight to [place] one week ago , no cough , sob on exertion during the trip , been having shortness of breath on exertion associated with orthopnea, pnd past few days after her return from [place]  npo fever , took covid vaccine in feb , started to have cough , intermittent shortness of breath since may 2021, check for covid multiple times in last couple of weak was told neg   pt been allergy / immunology recently - unclear w/u  no miscarriages , no rash , no h/o heavy menstural bleeding, no h/o gi bleed , never received transfusion  family hx of " blood clots"     Awake alert no chest pain no arrhythmias noted         +REVIEW OF SYSTEMS:   Review of Systems:  · CARDIOVASCULAR: - - -  · Cardiovascular [+]: PERIPHERAL EDEMA  · RESPIRATORY: - - -  · Respiratory [+]: COUGH, EXERTIONAL DYSPNEA, ORTHOPNEA  · ROS STATEMENT: all other ROS negative except as per HPI        ALLERGIES AND HOME MEDICATIONS:   Allergies:        Allergies:  	erythromycin: Drug, Hives  	macrolide antibiotics: Drug Category, Other, Originally entered in [facility] system as: Unreported Reaction to MACROLIDE IMMUNOSUPPRESSANT, Unreported Reaction to MACROLIDE ANTIBIOTICS    Home Medications:   * Patient Currently Takes Medications as of 05-Jul-2021 15:16 documented in Structured Notes  · 	predniSONE 50 mg oral tablet: 1 tab(s) orally once a day   · 	Pepcid 20 mg oral tablet: 1 tab(s) orally once a day     Social History:  Social History (marital status, living situation, occupation, tobacco use, alcohol and drug use, and sexual history): denies alcohol use, smoking        PHYSICAL EXAM:   · CONSTITUTIONAL: Well appearing, well nourished, awake, alert, oriented to person, place, time/situation and in no apparent distress.  · ENMT: Airway patent, Nasal mucosa clear. Mouth with normal mucosa. Throat has no vesicles, no oropharyngeal exudates and uvula is midline.  · EYES: Clear bilaterally, pupils equal, round and reactive to light.  · CARDIAC: - - -  · CARDIAC RHYTHM: regular   · CARDIAC RATE: normal  · CARDIAC SOUNDS: S1-S2  · CARDIAC MURMUR: 2/6 systolic  murmur   · CARDIAC PEDAL EDEMA: absent   · CARDIAC CAPI REFILL: less than or equal to 2 seconds   · CARDIAC JVD: non-distended bilaterally   · RESPIRATORY: Breath sounds clear and equal bilaterally.  · GASTROINTESTINAL: - - -  · Abdominal Exam: soft  · Abdominal Tenderness Location: non tender  · Bowel Sounds: present x 4 quadrants  · GENITOURINARY: No discharge, lesions.  · MUSCULOSKELETAL: Spine appears normal, range of motion is not limited, no muscle or joint tenderness  · NEUROLOGICAL: Alert and oriented, no focal deficits, no motor or sensory deficits.  · SKIN: Skin normal color for race, warm, dry and intact. No evidence of rash.      LAB:    Comprehensive Metabolic Panel (07.24.21 @ 08:33)   Sodium, Serum: 138 mmol/L   Potassium, Serum: 4.5: SPECIMEN MILDLY HEMOLYZED mmol/L   Chloride, Serum: 105 mmol/L   Carbon Dioxide, Serum: 20 mmol/L   Anion Gap, Serum: 13 mmol/L   Blood Urea Nitrogen, Serum: 10 mg/dL   Creatinine, Serum: 0.85 mg/dL   Glucose, Serum: 122 mg/dL   Calcium, Total Serum: 8.9 mg/dL   Complete Blood Count + Automated Diff (07.24.21 @ 08:33)   WBC Count: 7.81 K/uL   RBC Count: 4.30 M/uL   Hemoglobin: 10.4 g/dL   Hematocrit: 34.0 %   Mean Cell Volume: 79.1 fL   Mean Cell Hemoglobin: 24.2 pg   Mean Cell Hemoglobin Conc: 30.6 gm/dL   Red Cell Distrib Width: 15.2 %   Platelet Count - Automated: 406 K/uL     D-Dimer Assay, Quantitative: 1308    Troponin T, High Sensitivity Result: <6:    EXAM:  CT ANGIO CHEST PULM ART WAWIC    IMPRESSION:  Multiple bilateral segmental and subsegmental pulmonary emboli.Ventricular septal flattening may represent right heart strain. Recommend further evaluation with echocardiogram if clinically warranted.        EXAM:  US DPLX LWR EXT VEINS COMPL BI    IMPRESSION:  Acute above-the-knee left popliteal vein thrombus.    ECG:Sinus rhythm at 88 BPM LAE No acute ST T wave abnormalities

## 2021-07-24 NOTE — H&P ADULT - ASSESSMENT
pt with insig past med hx p/w cob on exertion, pedal edema c/w   < from: US Duplex Venous Lower Ext Complete, Bilateral (07.24.21 @ 09:41) >  Acute above-the-knee left popliteal vein thrombus.    < end of copied text >    < from: CT Angio Chest PE Protocol w/ IV Cont (07.24.21 @ 12:00) >  Multiple bilateral segmental and subsegmental pulmonary emboli.Ventricular septal flattening may represent right heart strain. Recommend further evaluation with echocardiogram if clinically warranted.    < end of copied text >    - check factor  V leiden, prothrombin gene mutation test , start heparin drip   -tele, echo to check for rv strain   -CTA abd to check for extension of clot burden   - lupus profile    had extensive lengthy discussion with pt about pts current clinical status , management plan , all questions answred pt with insig past med hx p/w cob on exertion, pedal edema c/w   < from: US Duplex Venous Lower Ext Complete, Bilateral (07.24.21 @ 09:41) >  Acute above-the-knee left popliteal vein thrombus.    < end of copied text >    < from: CT Angio Chest PE Protocol w/ IV Cont (07.24.21 @ 12:00) >  Multiple bilateral segmental and subsegmental pulmonary emboli.Ventricular septal flattening may represent right heart strain. Recommend further evaluation with echocardiogram if clinically warranted.    < end of copied text >    - check factor  V leiden, prothrombin gene mutation test , start heparin drip   -tele, echo to check for rv strain   -CTA abd to check for extension of clot burden   - lupus profile  -Microcytic anemia- no signs of active bleeding at present, iron studies, heme eval  had extensive lengthy discussion with pt about pts current clinical status , management plan , all questions answered

## 2021-07-24 NOTE — ED ADULT NURSE NOTE - NSIMPLEMENTINTERV_GEN_ALL_ED
Implemented All Universal Safety Interventions:  [place] to call system. Call bell, personal items and telephone within reach. Instruct patient to call for assistance. Room bathroom lighting operational. Non-slip footwear when patient is off stretcher. Physically safe environment: no spills, clutter or unnecessary equipment. Stretcher in lowest position, wheels locked, appropriate side rails in place.

## 2021-07-24 NOTE — CONSULT NOTE ADULT - ASSESSMENT
46 yo F w/ sig PmHx p/w ~3 months of intermittent partial lip swelling, leg swelling, nonproductive cough, and SIERRA.   Pt denies CP, palp, skin changes, rashes. She does note ~60 lb weight gain but attributes that to lifestyle changes during the pandemic.       Problem/Plan - 1:  ·  Problem: Acute pulmonary embolism without acute cor pulmonale, unspecified pulmonary embolism type.  Plan: Presented with Dyspnea  Acute PE on CTPA  Unprovoked DVT/PE  On Heparin gtt    Awaiting TTE RV strain  Eventual transition to Eliquis.     Problem/Plan - 2:  ·  Problem: Acute deep vein thrombosis (DVT) of popliteal vein of left lower extremity.  Plan: Unprovoked DVT  On Heparin gtt.

## 2021-07-24 NOTE — CONSULT NOTE ADULT - TIME BILLING
- Review of records, telemetry, vital signs and daily labs.   - General and cardiovascular physical examination.  - Generation of cardiovascular treatment plan.  - Coordination of care.  -Face to face discussion ~~30minutes on CTPA  results and plan of care       Patient was seen and examined by me on 07/24/2021,interim events noted,labs and radiology studies reviewed.  Harvey Long MD,FACC.  [address] [place], [place] [id number].  [phone]

## 2021-07-24 NOTE — ED ADULT NURSE NOTE - OBJECTIVE STATEMENT
Bilateral lower ext. swelling worse on RLE, no pitting present, reports SOB intermittently with exertion, reports 60lb weight gain during pandemic, MD at bedside - awaiting further orders Bilateral lower ext. swelling since Wednesday, worse on RLE, no pitting present, reports SOB intermittently with exertion, reports 60lb weight gain during pandemic, MD at bedside - awaiting further orders

## 2021-07-24 NOTE — H&P ADULT - HISTORY OF PRESENT ILLNESS
44 yo F w/ sig PmHx p/w ~3 months of intermittent partial lip swelling, leg swelling, nonproductive cough, and SIERRA. Pt denies CP, palp, skin changes, rashes. She does note ~60 lb weight gain but attributes that to lifestyle changes during the pandemic. Pt has been previously seen by an allergist. Pt stated that she is planning to follow up with a cardiologist.  pt had recent flight to [place] one week ago , no cough , sob on exertion during the trip , been having shortness of breath on exertion associated with orthopnea, pnd past few days after her return from [place]  npo fever , took covid vaccine in feb , started to have cough , intermittent shortness of breath since may 2021, check for covid multiple times in last couple of weak was told neg   pt been allergy / immunology recently - unclear w/u  no miscarriages , no rash , no h/o heavy menstural bleeding, no h/o gi bleed , never received transfusion  family hx of " blood clots" +

## 2021-07-24 NOTE — ED ADULT NURSE REASSESSMENT NOTE - NS ED NURSE REASSESS COMMENT FT1
Pt a&ox3, endorses swelling to b/l lower extremities for the past several weeks, denies shortness of breath, denies chest pain, no abd discomfort, no dysuria/hematuria, skin is cool dry and intact, ivl placed, labs collected and sent, will continue to monitor.

## 2021-07-24 NOTE — ED PROVIDER NOTE - OBJECTIVE STATEMENT
Attending/Olyainka: 46 yo F w/ sig PmHx p/w ~3 months of intermittent partial lip swelling, leg swelling, nonproductive cough, and SIERRA. Pt denies CP, palp, skin changes, rashes. She does note ~60 lb weight gain but attributes that to lifestyle changes during the pandemic. Pt has been previously seen by an allergist. Pt stated that she is planning to follow up with a cardiologist. Attending/Olayinka: 44 yo F w/ sig PmHx p/w ~3 months of intermittent partial lip swelling, leg swelling, nonproductive cough, and SIERRA. Pt denies CP, palp, skin changes, rashes. She does note ~60 lb weight gain but attributes that to lifestyle changes during the pandemic. Pt has been previously seen by an allergist. Pt stated that she is planning to follow up with a cardiologist.    Also notes 2 pillow orthopnea past 2 months.  Denies fevers.  No n/v/c/d, no urinary sx.

## 2021-07-24 NOTE — ED ADULT NURSE REASSESSMENT NOTE - NS ED NURSE REASSESS COMMENT FT1
report received from break coverage ZAHEER Ricks. Pt resting comfortably. pt offered no new complains at present. respiration even and non-labored. in NAD. NSR on monitor. Ambulates to bathroom

## 2021-07-24 NOTE — ED ADULT NURSE NOTE - CHIEF COMPLAINT QUOTE
Pt c/o B/L lower extremity swelling with pain. Pt recently traveled from [facility] legs have been swollen since then, but worse tonight.  Has occurred in the past and has relieved on its own, but not getting better.  Pt endorses SOB increased when ambulating.  Pt was seen here 7/5 for same swelling with angioedema, no signs of swelling to face/tongue, drooling, or compromised airway presently.

## 2021-07-24 NOTE — ED ADULT TRIAGE NOTE - CHIEF COMPLAINT QUOTE
Pt c/o B/L lower extremity swelling with pain. Pt recently traveled from [facility] legs have been swollen since then, but worse tonight.  Has occurred in the past and has relieved on its own, but not getting better.  Pt endorses SOB increased when ambulating.  Pt was seen here 7/5 for same swelling with angioedema, no signs of swelling to face/tongue, drooling, or compromised airway presently.

## 2021-07-24 NOTE — ED PROVIDER NOTE - NSFOLLOWUPINSTRUCTIONS_ED_ALL_ED_FT
You should follow up with rheumatology in their clinic.  Please call at [phone] for an urgent follow up appointment. Our follow up coordinator should also call you to help coordinate.    You can also call the pulmonology clinic to schedule an appointment for your shortness of breath.

## 2021-07-24 NOTE — ED ADULT NURSE REASSESSMENT NOTE - NS ED NURSE REASSESS COMMENT FT1
report received from ZAHEER Lin. Pt resting comfortably. pt offered no complains at present. bl calf swelling still noted, +1 edema to right calf and +2 to left calf. respiration even and non-labored. in NAD. Transport by bedside for US.

## 2021-07-24 NOTE — PHARMACOTHERAPY INTERVENTION NOTE - COMMENTS
Medication history is complete and was verified with patient and [facility]. Patient reports she takes one prescription medication, albuterol nebulization solution, as needed. Medication list updated in Outpatient Medication Record (OMR). Please call spectra [phone] if you have any questions.

## 2021-07-24 NOTE — ED PROVIDER NOTE - PROGRESS NOTE DETAILS
Radiology called with +L pop DVT finding.  Patient updated, agreeable with starting eliquis.  Ordered.  Will await CTA chest for dispo.  Place on tele. patient updated with results including CTA PE's.  Agreeable with admission.  Discussed with Dr. Long including pending rheum workup.

## 2021-07-24 NOTE — ED PROVIDER NOTE - CLINICAL SUMMARY MEDICAL DECISION MAKING FREE TEXT BOX
A/P 46 yo F p/w intermittent partial lip swelling, LE edema, SIERRA and dry cough possibly rheumatologic  -d-dimer, TSH, UA, labs, CXR, EKG, Rheum consult

## 2021-07-24 NOTE — ED ADULT NURSE REASSESSMENT NOTE - NS ED NURSE REASSESS COMMENT FT1
pt started on heparin drip 19ml/hr as per order at 1925. pt made aware of increased risk of bleeding. Side rails up, bed at lowest position. safety maintained. NSr on monitor

## 2021-07-24 NOTE — ED PROVIDER NOTE - PHYSICAL EXAMINATION
Attending/Olayinka: Well-appearing, NAD; PERRL/EOMI, non-icterus, supple, no LAVELLE, no JVD, RRR, CTAB; Abd-soft, NT/ND, no HSM; mild non-pitting LE edema, A&Ox3, nonfocal; Skin-warm/dry

## 2021-07-25 LAB
ANION GAP SERPL CALC-SCNC: 12 MMOL/L — SIGNIFICANT CHANGE UP (ref 7–14)
APTT BLD: 106.7 SEC — HIGH (ref 27–36.3)
APTT BLD: >200 SEC — SIGNIFICANT CHANGE UP (ref 27–36.3)
BUN SERPL-MCNC: 7 MG/DL — SIGNIFICANT CHANGE UP (ref 7–23)
CALCIUM SERPL-MCNC: 8.4 MG/DL — SIGNIFICANT CHANGE UP (ref 8.4–10.5)
CHLORIDE SERPL-SCNC: 105 MMOL/L — SIGNIFICANT CHANGE UP (ref 98–107)
CO2 SERPL-SCNC: 22 MMOL/L — SIGNIFICANT CHANGE UP (ref 22–31)
COVID-19 SPIKE DOMAIN AB INTERP: POSITIVE
COVID-19 SPIKE DOMAIN ANTIBODY RESULT: >250 U/ML — HIGH
CREAT SERPL-MCNC: 0.77 MG/DL — SIGNIFICANT CHANGE UP (ref 0.5–1.3)
GLUCOSE SERPL-MCNC: 118 MG/DL — HIGH (ref 70–99)
HCT VFR BLD CALC: 29.4 % — LOW (ref 34.5–45)
HCT VFR BLD CALC: 30.9 % — LOW (ref 34.5–45)
HGB BLD-MCNC: 9 G/DL — LOW (ref 11.5–15.5)
HGB BLD-MCNC: 9.4 G/DL — LOW (ref 11.5–15.5)
MAGNESIUM SERPL-MCNC: 1.7 MG/DL — SIGNIFICANT CHANGE UP (ref 1.6–2.6)
MCHC RBC-ENTMCNC: 23.9 PG — LOW (ref 27–34)
MCHC RBC-ENTMCNC: 24 PG — LOW (ref 27–34)
MCHC RBC-ENTMCNC: 30.4 GM/DL — LOW (ref 32–36)
MCHC RBC-ENTMCNC: 30.6 GM/DL — LOW (ref 32–36)
MCV RBC AUTO: 78.4 FL — LOW (ref 80–100)
MCV RBC AUTO: 78.6 FL — LOW (ref 80–100)
NRBC # BLD: 0 /100 WBCS — SIGNIFICANT CHANGE UP
NRBC # BLD: 0 /100 WBCS — SIGNIFICANT CHANGE UP
NRBC # FLD: 0 K/UL — SIGNIFICANT CHANGE UP
NRBC # FLD: 0 K/UL — SIGNIFICANT CHANGE UP
PHOSPHATE SERPL-MCNC: 3.2 MG/DL — SIGNIFICANT CHANGE UP (ref 2.5–4.5)
PLATELET # BLD AUTO: 379 K/UL — SIGNIFICANT CHANGE UP (ref 150–400)
PLATELET # BLD AUTO: 415 K/UL — HIGH (ref 150–400)
POTASSIUM SERPL-MCNC: 3.9 MMOL/L — SIGNIFICANT CHANGE UP (ref 3.5–5.3)
POTASSIUM SERPL-SCNC: 3.9 MMOL/L — SIGNIFICANT CHANGE UP (ref 3.5–5.3)
RBC # BLD: 3.75 M/UL — LOW (ref 3.8–5.2)
RBC # BLD: 3.93 M/UL — SIGNIFICANT CHANGE UP (ref 3.8–5.2)
RBC # FLD: 15 % — HIGH (ref 10.3–14.5)
RBC # FLD: 15.2 % — HIGH (ref 10.3–14.5)
SARS-COV-2 IGG+IGM SERPL QL IA: >250 U/ML — HIGH
SARS-COV-2 IGG+IGM SERPL QL IA: POSITIVE
SODIUM SERPL-SCNC: 139 MMOL/L — SIGNIFICANT CHANGE UP (ref 135–145)
WBC # BLD: 5.99 K/UL — SIGNIFICANT CHANGE UP (ref 3.8–10.5)
WBC # BLD: 7.6 K/UL — SIGNIFICANT CHANGE UP (ref 3.8–10.5)
WBC # FLD AUTO: 5.99 K/UL — SIGNIFICANT CHANGE UP (ref 3.8–10.5)
WBC # FLD AUTO: 7.6 K/UL — SIGNIFICANT CHANGE UP (ref 3.8–10.5)

## 2021-07-25 PROCEDURE — 70450 CT HEAD/BRAIN W/O DYE: CPT | Mod: 26

## 2021-07-25 PROCEDURE — G0452: CPT | Mod: 26

## 2021-07-25 PROCEDURE — 74177 CT ABD & PELVIS W/CONTRAST: CPT | Mod: 26

## 2021-07-25 RX ORDER — APIXABAN 2.5 MG/1
10 TABLET, FILM COATED ORAL
Refills: 0 | Status: DISCONTINUED | OUTPATIENT
Start: 2021-07-25 | End: 2021-07-28

## 2021-07-25 RX ORDER — OXYCODONE AND ACETAMINOPHEN 5; 325 MG/1; MG/1
1 TABLET ORAL EVERY 6 HOURS
Refills: 0 | Status: DISCONTINUED | OUTPATIENT
Start: 2021-07-25 | End: 2021-07-29

## 2021-07-25 RX ORDER — ACETAMINOPHEN 500 MG
650 TABLET ORAL ONCE
Refills: 0 | Status: COMPLETED | OUTPATIENT
Start: 2021-07-25 | End: 2021-07-25

## 2021-07-25 RX ADMIN — Medication 650 MILLIGRAM(S): at 10:58

## 2021-07-25 RX ADMIN — OXYCODONE AND ACETAMINOPHEN 1 TABLET(S): 5; 325 TABLET ORAL at 23:45

## 2021-07-25 RX ADMIN — HEPARIN SODIUM 1600 UNIT(S)/HR: 5000 INJECTION INTRAVENOUS; SUBCUTANEOUS at 03:25

## 2021-07-25 RX ADMIN — APIXABAN 10 MILLIGRAM(S): 2.5 TABLET, FILM COATED ORAL at 11:54

## 2021-07-25 RX ADMIN — OXYCODONE AND ACETAMINOPHEN 1 TABLET(S): 5; 325 TABLET ORAL at 14:34

## 2021-07-25 RX ADMIN — Medication 650 MILLIGRAM(S): at 09:58

## 2021-07-25 RX ADMIN — HEPARIN SODIUM 0 UNIT(S)/HR: 5000 INJECTION INTRAVENOUS; SUBCUTANEOUS at 02:25

## 2021-07-25 RX ADMIN — APIXABAN 10 MILLIGRAM(S): 2.5 TABLET, FILM COATED ORAL at 22:51

## 2021-07-25 RX ADMIN — PANTOPRAZOLE SODIUM 40 MILLIGRAM(S): 20 TABLET, DELAYED RELEASE ORAL at 06:26

## 2021-07-25 RX ADMIN — OXYCODONE AND ACETAMINOPHEN 1 TABLET(S): 5; 325 TABLET ORAL at 23:00

## 2021-07-25 RX ADMIN — Medication 100 MILLIGRAM(S): at 22:55

## 2021-07-25 NOTE — CONSULT NOTE ADULT - ASSESSMENT
46 yo F w/ sig PmHx p/w ~3 months of intermittent partial lip swelling, leg swelling, nonproductive cough, and SIERRA found to have DVT PE     DVT/PE  - pt found to have Acute above-the-knee left popliteal vein thrombus.  - Multiple bilateral segmental and subsegmental pulmonary emboli. Ventricular septal flattening may represent right heart strain  - currently on heparin   - pt with trip to [place] last week on plane for about 2 hours.   - ? provoked thrombus, short trip  - has aunt that had blood clot after surgery   - unclear etiology at this time, possibly provoked   - would recommend transitioning to eliquis 10mg bid for one week then 5mg bid on DC   - cont heparin fro now   - echo to be done   - would recommend ct abd pelvis to assess for malignancy     anemia   - mild microcytic anemia   - check giuac   - radha check iron tibc ferritin, b12, folate spep hg electro  - keep hg>7     Lip swelling   - hasnt had since stopping wellburtin   - ? allergy       Jagdish Bradley MD  Hematology Oncology   O: [phone]  C: [phone]  46 yo F w/ sig PmHx p/w ~3 months of intermittent partial lip swelling, leg swelling, nonproductive cough, and SIERRA found to have DVT PE     DVT/PE  - pt found to have Acute above-the-knee left popliteal vein thrombus.  - Multiple bilateral segmental and subsegmental pulmonary emboli. Ventricular septal flattening may represent right heart strain  - currently on heparin   - pt with trip to [place] last week on plane for about 2 hours.   - ? provoked thrombus, short trip  - has aunt that had blood clot after surgery   - unclear etiology at this time, possibly provoked   - would recommend transitioning to eliquis 10mg bid for one week then 5mg bid on DC   - cont heparin fro now   - echo to be done   - hypercoag work up outpatient   - would recommend ct abd pelvis to assess for malignancy     anemia   - mild microcytic anemia   - check giuac   - radha check iron tibc ferritin, b12, folate spep hg electro  - keep hg>7     Lip swelling   - hasnt had since stopping wellburtin   - ? allergy       Jagdish Bradley MD  Hematology Oncology   O:   C: [phone]

## 2021-07-25 NOTE — CHART NOTE - NSCHARTNOTEFT_GEN_A_CORE
Notified by RN patient requesting for CT head for headache which is  not relived after taking po Tylenol and percocet. Discussed with Dr. Gates who recommended  CT head. Patient seen and assessed at bedside. Patient reports left sided headache which starts from the left eye and  radiates to left side of head and left shoulder area. Denies vision changes. Report pain is throbbing and intermittent. Patient reports cough and reports headache is worse while coughing.  No neurological deficits noted. Ordered Robitussin for cough. Awaiting CT head results.

## 2021-07-25 NOTE — CONSULT NOTE ADULT - SUBJECTIVE AND OBJECTIVE BOX
Reason for consult: DVT PE     HPI:  46 yo F w/ sig PmHx p/w ~3 months of intermittent partial lip swelling, leg swelling, nonproductive cough, and SIERRA. Pt denies CP, palp, skin changes, rashes. She does note ~60 lb weight gain but attributes that to lifestyle changes during the pandemic. Pt has been previously seen by an allergist. Pt stated that she is planning to follow up with a cardiologist.  pt had recent flight to [place] one week ago , no cough , sob on exertion during the trip , been having shortness of breath on exertion associated with orthopnea, pnd past few days after her return from [place]  npo fever , took covid vaccine in feb , started to have cough , intermittent shortness of breath since may 2021, check for covid multiple times in last couple of weak was told neg   pt been allergy / immunology recently - unclear w/u  no miscarriages , no rash , no h/o heavy menstural bleeding, no h/o gi bleed , never received transfusion  family hx of " blood clots" + (24 Jul 2021 16:04)      Pt seen at bedside. feels well today.     PAST MEDICAL & SURGICAL HISTORY:  No pertinent past medical history    No significant past surgical history        FAMILY HISTORY:      Alochol: Denied  Smoking: Nonsmoker  Drug Use: Denied  Marital Status:         Allergies    erythromycin (Hives)  macrolide antibiotics (Other)    Intolerances        MEDICATIONS  (STANDING):  heparin  Infusion.  Unit(s)/Hr (19 mL/Hr) IV Continuous <Continuous>  pantoprazole    Tablet 40 milliGRAM(s) Oral before breakfast    MEDICATIONS  (PRN):  heparin   Injectable 9000 Unit(s) IV Push every 6 hours PRN For aPTT less than 40  heparin   Injectable 4000 Unit(s) IV Push every 6 hours PRN For aPTT between 40 - 57      ROS:     General:  No wt loss, fevers, chills, night sweats, fatigue,   Eyes:  Good vision, no reported pain  ENT:  No sore throat, pain, runny nose, dysphagia  CV:  No pain, palpitations, hypo/hypertension  Resp + cough   :  No pain, bleeding, incontinence, nocturia  Muscle: LE swelling   Neuro:  No weakness, tingling, memory problems  Psych:  No fatigue, insomnia, mood problems, depression  Endocrine:  No polyuria, polydipsia, cold/heat intolerance  Heme:  No petechiae, ecchymosis, easy bruisability  Skin:  No rash, tattoos, scars, edema      PHYSICAL EXAM:     GENERAL:  Appears stated age, well-groomed  HEENT:  NC/AT,  CHEST:  CTA b/l   HEART: s1s2+   ABDOMEN:  Soft, non-tender, non-distended  EXTEREMITIES:  no edema. no calve swelling   SKIN:  No rash/erythema/ecchymoses  NEURO:  Alert, oriented, no asterixis                            9.0    7.60  )-----------( 379      ( 25 Jul 2021 01:45 )             29.4       07-24    138  |  105  |  10  ----------------------------<  122<H>  4.5   |  20<L>  |  0.85    Ca    8.9      24 Jul 2021 08:33  Phos  3.6     07-24  Mg     1.70     07-24    TPro  7.1  /  Alb  3.9  /  TBili  <0.2  /  DBili  x   /  AST  14  /  ALT  11  /  AlkPhos  90  07-24

## 2021-07-25 NOTE — PROGRESS NOTE ADULT - SUBJECTIVE AND OBJECTIVE BOX
DATE OF SERVICE:  07/25/2021  Patient was seen and examined on  07/25/2021   .Interim events noted.Consultant notes ,Labs,Telemetry reviewed by me    PRESENTING CC:Dyspnea    HPI and HOSPITAL COURSE: HPI:  44 yo F w/ sig PmHx p/w ~3 months of intermittent partial lip swelling, leg swelling, nonproductive cough, and SIERRA. Pt denies CP, palp, skin changes, rashes. She does note ~60 lb weight gain but attributes that to lifestyle changes during the pandemic. Pt has been previously seen by an allergist. Pt stated that she is planning to follow up with a cardiologist.  pt had recent flight to [place] one week ago , no cough , sob on exertion during the trip , been having shortness of breath on exertion associated with orthopnea, pnd past few days after her return from [place]  npo fever , took covid vaccine in feb , started to have cough , intermittent shortness of breath since may 2021, check for covid multiple times in last couple of weak was told neg   pt been allergy / immunology recently - unclear w/u  no miscarriages , no rash , no h/o heavy menstural bleeding, no h/o gi bleed , never received transfusion  family hx of " blood clots" + (24 Jul 2021 16:04)      INTERIM EVENTS:      PMH -reviewed admission note, no change since admission  Heart Failure: Acute [ ] Chronic [ ] Acute on Chronic [ ] Diastolic [ ] Systolic [ ] Combined Systolic and Diastolic[ ]  JAELYN[ ]  ATN[ ]  CKD I [ ] CKDII [ ] CKD III [ ] CKD IV [ ] CKD V [ ] ESRD[ ]  HTN[ ] CVA[ ] DM[ ] COPD[ ] COVID[ ] AF[ ]  PPM[ ] ICD[ ]    MEDICATIONS  (STANDING):  heparin  Infusion.  Unit(s)/Hr (19 mL/Hr) IV Continuous <Continuous>  pantoprazole    Tablet 40 milliGRAM(s) Oral before breakfast    MEDICATIONS  (PRN):  heparin   Injectable 9000 Unit(s) IV Push every 6 hours PRN For aPTT less than 40  heparin   Injectable 4000 Unit(s) IV Push every 6 hours PRN For aPTT between 40 - 57            REVIEW OF SYSTEMS:  Constitutional: [ ] fever, [ ]weight loss,  [ ]fatigue  Eyes: [ ] visual changes  Respiratory: [ ]shortness of breath;  [ ] cough, [ ]wheezing, [ ]chills, [ ]hemoptysis  Cardiovascular: [ ] chest pain, [ ]palpitations, [ ]dizziness,  [ ]leg swelling[ ]orthopnea[ ]PND  Gastrointestinal: [ ] abdominal pain, [ ]nausea, [ ]vomiting,  [ ]diarrhea [ ]Constipation [ ]Melena  Genitourinary: [ ] dysuria, [ ] hematuria [ ]Arnold  Neurologic: [ ] headaches [ ] tremors[ ]weakness [ ]Paralysis Right[ ] Left[ ]  Skin: [ ] itching, [ ]burning, [ ] rashes  Endocrine: [ ] heat or cold intolerance  Musculoskeletal: [ ] joint pain or swelling; [ ] muscle, back, or extremity pain  Psychiatric: [ ] depression, [ ]anxiety, [ ]mood swings, or [ ]difficulty sleeping  Hematologic: [ ] easy bruising, [ ] bleeding gums    [x] All remaining systems negative except as per above.   [ ]Unable to obtain.    Vital Signs Last 24 Hrs  T(C): 36.7 (25 Jul 2021 00:00), Max: 37.1 (24 Jul 2021 15:58)  T(F): 98 (25 Jul 2021 00:00), Max: 98.8 (24 Jul 2021 15:58)  HR: 73 (25 Jul 2021 00:00) (73 - 94)  BP: 130/72 (25 Jul 2021 00:00) (125/68 - 137/89)  BP(mean): --  RR: 18 (25 Jul 2021 00:00) (18 - 20)  SpO2: 100% (25 Jul 2021 00:00) (100% - 100%)  I&O's Summary      PHYSICAL EXAM:  General: No acute distress BMI-  HEENT: EOMI, PERRL  Neck: Supple, [ ] JVD  Lungs: Equal air entry bilaterally; [ ] rales [ ] wheezing [ ] rhonchi  Heart: Regular rate and rhythm; [ ] murmur   /6 [ ] systolic [ ] diastolic [ ] radiation[ ] rubs [ ]  gallops  Abdomen: Nontender, bowel sounds present  Extremities: No clubbing, cyanosis, [ ] edema [ ]Pulses  equal and intact  Nervous system:  Alert & Oriented X3, no focal deficits  Psychiatric: Normal affect  Skin: No rashes or lesions    LABS:  07-24    138  |  105  |  10  ----------------------------<  122<H>  4.5   |  20<L>  |  0.85    Ca    8.9      24 Jul 2021 08:33  Phos  3.6     07-24  Mg     1.70     07-24    TPro  7.1  /  Alb  3.9  /  TBili  <0.2  /  DBili  x   /  AST  14  /  ALT  11  /  AlkPhos  90  07-24    Creatinine Trend: 0.85<--                        9.0    7.60  )-----------( 379      ( 25 Jul 2021 01:45 )             29.4     PT/INR - ( 24 Jul 2021 19:35 )   PT: 15.2 sec;   INR: 1.35 ratio         PTT - ( 25 Jul 2021 01:45 )  PTT:>200 sec    Cardiac Enzymes:     Serum Pro-Brain Natriuretic Peptide: 31 pg/mL (07-24-21 @ 08:34)        RADIOLOGY:    ECG [my interpretation]:    TELEMETRY:Reviewed monitor tracings-    ECHO:    STRESS TEST:    CATHETERIZATION:      IMPRESSION AND PLAN:       DATE OF SERVICE:  07/25/2021  Patient was seen and examined on  07/25/2021   .Interim events noted.Consultant notes ,Labs,Telemetry reviewed by me    PRESENTING CC:Dyspnea    HPI and HOSPITAL COURSE: HPI:  46 yo F w/ sig PmHx p/w ~3 months of intermittent partial lip swelling, leg swelling, nonproductive cough, and SIERRA. Pt denies CP, palp, skin changes, rashes. She does note ~60 lb weight gain but attributes that to lifestyle changes during the pandemic. Pt has been previously seen by an allergist. Pt stated that she is planning to follow up with a cardiologist.  pt had recent flight to [place] one week ago , no cough , sob on exertion during the trip , been having shortness of breath on exertion associated with orthopnea, pnd past few days after her return from [place]  npo fever , took covid vaccine in feb , started to have cough , intermittent shortness of breath since may 2021, check for covid multiple times in last couple of weak was told neg   pt been allergy / immunology recently - unclear w/u  no miscarriages , no rash , no h/o heavy menstural bleeding, no h/o gi bleed , never received transfusion  family hx of " blood clots" + (24 Jul 2021 16:04)      INTERIM EVENTS:Awake alert leg discomfort and swelling is better no dyspnea  seen by Heme/Onc      PMH -reviewed admission note, no change since admission  Heart Failure: Acute [ ] Chronic [ ] Acute on Chronic [ ] Diastolic [ ] Systolic [ ] Combined Systolic and Diastolic[ ]  JAELYN[ ]  ATN[ ]  CKD I [ ] CKDII [ ] CKD III [ ] CKD IV [ ] CKD V [ ] ESRD[ ]  HTN[ ] CVA[ ] DM[ ] COPD[ ] COVID[ ] AF[ ]  PPM[ ] ICD[ ]    MEDICATIONS  (STANDING):  heparin  Infusion.  Unit(s)/Hr (19 mL/Hr) IV Continuous <Continuous>  pantoprazole    Tablet 40 milliGRAM(s) Oral before breakfast    MEDICATIONS  (PRN):  heparin   Injectable 9000 Unit(s) IV Push every 6 hours PRN For aPTT less than 40  heparin   Injectable 4000 Unit(s) IV Push every 6 hours PRN For aPTT between 40 - 57            REVIEW OF SYSTEMS:  Constitutional: [ ] fever, [ ]weight loss,  [ ]fatigue  Eyes: [ ] visual changes  Respiratory: [ ]shortness of breath;  [ ] cough, [ ]wheezing, [ ]chills, [ ]hemoptysis  Cardiovascular: [ ] chest pain, [ ]palpitations, [ ]dizziness,  [x ]leg swelling[ ]orthopnea[ ]PND  Gastrointestinal: [ ] abdominal pain, [ ]nausea, [ ]vomiting,  [ ]diarrhea [ ]Constipation [ ]Melena  Genitourinary: [ ] dysuria, [ ] hematuria [ ]Arnold  Neurologic: [ ] headaches [ ] tremors[ ]weakness [ ]Paralysis Right[ ] Left[ ]  Skin: [ ] itching, [ ]burning, [ ] rashes  Endocrine: [ ] heat or cold intolerance  Musculoskeletal: [ ] joint pain or swelling; [ ] muscle, back, or extremity pain  Psychiatric: [ ] depression, [ ]anxiety, [ ]mood swings, or [ ]difficulty sleeping  Hematologic: [ ] easy bruising, [ ] bleeding gums    [x] All remaining systems negative except as per above.   [ ]Unable to obtain.    Vital Signs Last 24 Hrs  T(C): 36.7 (25 Jul 2021 00:00), Max: 37.1 (24 Jul 2021 15:58)  T(F): 98 (25 Jul 2021 00:00), Max: 98.8 (24 Jul 2021 15:58)  HR: 73 (25 Jul 2021 00:00) (73 - 94)  BP: 130/72 (25 Jul 2021 00:00) (125/68 - 137/89)  RR: 18 (25 Jul 2021 00:00) (18 - 20)  SpO2: 100% (25 Jul 2021 00:00) (100% - 100%)  I&O's Summary      PHYSICAL EXAM:  General: No acute distress BMI-30  HEENT: EOMI, PERRL  Neck: Supple, [ ] JVD  Lungs: Equal air entry bilaterally; [ ] rales [ ] wheezing [ ] rhonchi  Heart: Regular rate and rhythm; [x ] murmur   2/6 [x ] systolic [ ] diastolic [ ] radiation[ ] rubs [ ]  gallops  Abdomen: Nontender, bowel sounds present  Extremities: No clubbing, cyanosis, [x ] edema [ ]Pulses  equal and intact  Nervous system:  Alert & Oriented X3, no focal deficits  Psychiatric: Normal affect  Skin: No rashes or lesions    LABS:  07-24    138  |  105  |  10  ----------------------------<  122<H>  4.5   |  20<L>  |  0.85    Ca    8.9      24 Jul 2021 08:33  Phos  3.6     07-24  Mg     1.70     07-24    TPro  7.1  /  Alb  3.9  /  TBili  <0.2  /  DBili  x   /  AST  14  /  ALT  11  /  AlkPhos  90  07-24    Creatinine Trend: 0.85<--                        9.0    7.60  )-----------( 379      ( 25 Jul 2021 01:45 )             29.4     PT/INR - ( 24 Jul 2021 19:35 )   PT: 15.2 sec;   INR: 1.35 ratio         PTT - ( 25 Jul 2021 01:45 )  PTT:>200 sec    Serum Pro-Brain Natriuretic Peptide: 31 pg/mL (07-24-21 @ 08:34)    RADIOLOGY:  EXAM:  CT ANGIO CHEST PULM ART WAWIC    IMPRESSION:  Multiple bilateral segmental and subsegmental pulmonary emboli.Ventricular septal flattening may represent right heart strain. Recommend further evaluation with echocardiogram if clinically warranted.        EXAM:  US DPLX LWR EXT VEINS COMPL BI    IMPRESSION:  Acute above-the-knee left popliteal vein thrombus      TELEMETRY:Reviewed monitor tracings-Sinus rhythm no arrhythmias        IMPRESSION AND PLAN:  46 yo F w/ sig PmHx p/w ~3 months of intermittent partial lip swelling, leg swelling, nonproductive cough, and SIERRA.   Pt denies CP, palp, skin changes, rashes. She does note ~60 lb weight gain but attributes that to lifestyle changes during the pandemic.       Problem/Plan - 1:  ·  Problem: Acute pulmonary embolism without acute cor pulmonale, unspecified pulmonary embolism type.  Plan: Presented with Dyspnea  Acute PE on CTPA  Unprovoked DVT/PE  On Heparin gtt    Awaiting TTE RV strain  Transition to Eliquis. 10mg BID for 7 days then 5mg BID    Problem/Plan - 2:  ·  Problem: Acute deep vein thrombosis (DVT) of popliteal vein of left lower extremity.  Plan: Unprovoked DVT  On Heparin gtt.     Seen by [facility]-Will order CT Abdomen-screen for occult malignancy

## 2021-07-25 NOTE — PROGRESS NOTE ADULT - SUBJECTIVE AND OBJECTIVE BOX
SUBJECTIVE / OVERNIGHT EVENTS: pt seen and examined      MEDICATIONS  (STANDING):  apixaban 10 milliGRAM(s) Oral two times a day  guaiFENesin Oral Liquid (Sugar-Free) 100 milliGRAM(s) Oral once  pantoprazole    Tablet 40 milliGRAM(s) Oral before breakfast    MEDICATIONS  (PRN):  oxycodone    5 mG/acetaminophen 325 mG 1 Tablet(s) Oral every 6 hours PRN Severe Pain (7 - 10)    Vital Signs Last 24 Hrs  T(C): 36.7 (2021 14:33), Max: 36.8 (2021 09:57)  T(F): 98 (2021 14:33), Max: 98.2 (2021 09:57)  HR: 79 (2021 14:33) (73 - 91)  BP: 123/82 (2021 14:33) (117/77 - 133/81)  BP(mean): --  RR: 17 (2021 14:33) (17 - 18)  SpO2: 100% (2021 14:33) (98% - 100%)    CAPILLARY BLOOD GLUCOSE        I&O's Summary      Constitutional: No fever, fatigue  Skin: No rash.  Eyes: No recent vision problems or eye pain.  ENT: No congestion, ear pain, or sore throat.  Cardiovascular: No chest pain or palpation.  Respiratory: No cough, shortness of breath, congestion, or wheezing.  Gastrointestinal: No abdominal pain, nausea, vomiting, or diarrhea.  Genitourinary: No dysuria.  Musculoskeletal: No joint swelling.  Neurologic: No headache.    PHYSICAL EXAM:  GENERAL: NAD  EYES: EOMI, PERRLA  NECK: Supple, No JVD  CHEST/LUNG: dec breath sounds at bases  HEART:  S1 , S2 +  ABDOMEN: soft , bs+  EXTREMITIES: trace edema  NEUROLOGY:alert awake oriented      LABS:                        9.4    5.99  )-----------( 415      ( 2021 10:30 )             30.9     07-25    139  |  105  |  7   ----------------------------<  118<H>  3.9   |  22  |  0.77    Ca    8.4      2021 10:30  Phos  3.2     07-25  Mg     1.70     07-25    TPro  7.1  /  Alb  3.9  /  TBili  <0.2  /  DBili  x   /  AST  14  /  ALT  11  /  AlkPhos  90      PT/INR - ( 2021 19:35 )   PT: 15.2 sec;   INR: 1.35 ratio         PTT - ( 2021 10:30 )  PTT:106.7 sec      Urinalysis Basic - ( 2021 10:56 )    Color: Light Brown / Appearance: Clear / S.012 / pH: x  Gluc: x / Ketone: Negative  / Bili: Negative / Urobili: <2 mg/dL   Blood: x / Protein: Trace / Nitrite: Negative   Leuk Esterase: Small / RBC: 488 /HPF / WBC 4 /HPF   Sq Epi: x / Non Sq Epi: 1 /HPF / Bacteria: Negative        RADIOLOGY & ADDITIONAL TESTS:    Imaging Personally Reviewed:    Consultant(s) Notes Reviewed:      Care Discussed with Consultants/Other Providers:

## 2021-07-26 LAB
ACE SERPL-CCNC: 29 U/L — SIGNIFICANT CHANGE UP (ref 14–82)
ANION GAP SERPL CALC-SCNC: 13 MMOL/L — SIGNIFICANT CHANGE UP (ref 7–14)
APCR PPP: 3.03 RATIO — SIGNIFICANT CHANGE UP
AT III ACT/NOR PPP CHRO: 103 % — SIGNIFICANT CHANGE UP (ref 76–140)
B2 GLYCOPROT1 AB SER QL: NEGATIVE — SIGNIFICANT CHANGE UP
BUN SERPL-MCNC: 10 MG/DL — SIGNIFICANT CHANGE UP (ref 7–23)
CALCIUM SERPL-MCNC: 8.5 MG/DL — SIGNIFICANT CHANGE UP (ref 8.4–10.5)
CARDIOLIPIN AB SER-ACNC: NEGATIVE — SIGNIFICANT CHANGE UP
CHLORIDE SERPL-SCNC: 103 MMOL/L — SIGNIFICANT CHANGE UP (ref 98–107)
CO2 SERPL-SCNC: 21 MMOL/L — LOW (ref 22–31)
CREAT SERPL-MCNC: 0.92 MG/DL — SIGNIFICANT CHANGE UP (ref 0.5–1.3)
DSDNA AB SER-ACNC: 13 IU/ML — SIGNIFICANT CHANGE UP
FERRITIN SERPL-MCNC: 19 NG/ML — SIGNIFICANT CHANGE UP (ref 15–150)
FOLATE SERPL-MCNC: 7.1 NG/ML — SIGNIFICANT CHANGE UP (ref 3.1–17.5)
GLUCOSE SERPL-MCNC: 119 MG/DL — HIGH (ref 70–99)
HCT VFR BLD CALC: 32.4 % — LOW (ref 34.5–45)
HGB BLD-MCNC: 9.9 G/DL — LOW (ref 11.5–15.5)
IRON SATN MFR SERPL: 27 UG/DL — LOW (ref 30–160)
IRON SATN MFR SERPL: 8 % — LOW (ref 14–50)
LDH SERPL L TO P-CCNC: 210 U/L — SIGNIFICANT CHANGE UP (ref 135–225)
MAGNESIUM SERPL-MCNC: 1.9 MG/DL — SIGNIFICANT CHANGE UP (ref 1.6–2.6)
MCHC RBC-ENTMCNC: 24.5 PG — LOW (ref 27–34)
MCHC RBC-ENTMCNC: 30.6 GM/DL — LOW (ref 32–36)
MCV RBC AUTO: 80.2 FL — SIGNIFICANT CHANGE UP (ref 80–100)
NRBC # BLD: 0 /100 WBCS — SIGNIFICANT CHANGE UP
NRBC # FLD: 0 K/UL — SIGNIFICANT CHANGE UP
PHOSPHATE SERPL-MCNC: 3.5 MG/DL — SIGNIFICANT CHANGE UP (ref 2.5–4.5)
PLATELET # BLD AUTO: 457 K/UL — HIGH (ref 150–400)
POTASSIUM SERPL-MCNC: 3.7 MMOL/L — SIGNIFICANT CHANGE UP (ref 3.5–5.3)
POTASSIUM SERPL-SCNC: 3.7 MMOL/L — SIGNIFICANT CHANGE UP (ref 3.5–5.3)
PROT C ACT/NOR PPP: 108 % — SIGNIFICANT CHANGE UP (ref 74–150)
PROT SERPL-MCNC: 7 G/DL — SIGNIFICANT CHANGE UP (ref 6–8.3)
RBC # BLD: 4.04 M/UL — SIGNIFICANT CHANGE UP (ref 3.8–5.2)
RBC # FLD: 15.2 % — HIGH (ref 10.3–14.5)
SODIUM SERPL-SCNC: 137 MMOL/L — SIGNIFICANT CHANGE UP (ref 135–145)
TIBC SERPL-MCNC: 350 UG/DL — SIGNIFICANT CHANGE UP (ref 220–430)
UIBC SERPL-MCNC: 323 UG/DL — SIGNIFICANT CHANGE UP (ref 110–370)
VIT B12 SERPL-MCNC: 488 PG/ML — SIGNIFICANT CHANGE UP (ref 200–900)
WBC # BLD: 7.9 K/UL — SIGNIFICANT CHANGE UP (ref 3.8–10.5)
WBC # FLD AUTO: 7.9 K/UL — SIGNIFICANT CHANGE UP (ref 3.8–10.5)

## 2021-07-26 PROCEDURE — 86334 IMMUNOFIX E-PHORESIS SERUM: CPT | Mod: 26

## 2021-07-26 PROCEDURE — G0452: CPT | Mod: 26

## 2021-07-26 PROCEDURE — 84165 PROTEIN E-PHORESIS SERUM: CPT | Mod: 26

## 2021-07-26 RX ADMIN — APIXABAN 10 MILLIGRAM(S): 2.5 TABLET, FILM COATED ORAL at 06:01

## 2021-07-26 RX ADMIN — APIXABAN 10 MILLIGRAM(S): 2.5 TABLET, FILM COATED ORAL at 18:24

## 2021-07-26 RX ADMIN — PANTOPRAZOLE SODIUM 40 MILLIGRAM(S): 20 TABLET, DELAYED RELEASE ORAL at 06:01

## 2021-07-26 NOTE — PROGRESS NOTE ADULT - SUBJECTIVE AND OBJECTIVE BOX
SUBJECTIVE / OVERNIGHT EVENTS: pt seen and examined    MEDICATIONS  (STANDING):  apixaban 10 milliGRAM(s) Oral two times a day  pantoprazole    Tablet 40 milliGRAM(s) Oral before breakfast    MEDICATIONS  (PRN):  oxycodone    5 mG/acetaminophen 325 mG 1 Tablet(s) Oral every 6 hours PRN Severe Pain (7 - 10)    Vital Signs Last 24 Hrs  T(C): 36.8 (2021 21:17), Max: 36.8 (2021 10:04)  T(F): 98.2 (2021 21:17), Max: 98.3 (2021 10:04)  HR: 95 (:17) (82 - 96)  BP: 125/75 (:17) (125/75 - 136/85)  BP(mean): --  RR: 16 (2021 21:17) (16 - 18)  SpO2: 100% (:17) (99% - 100%)      I&O's Summary      Constitutional: No fever, fatigue  Skin: No rash.  Eyes: No recent vision problems or eye pain.  ENT: No congestion, ear pain, or sore throat.  Cardiovascular: No chest pain or palpation.  Respiratory: No cough, shortness of breath, congestion, or wheezing.  Gastrointestinal: No abdominal pain, nausea, vomiting, or diarrhea.  Genitourinary: No dysuria.  Musculoskeletal: No joint swelling.  Neurologic: No headache.    PHYSICAL EXAM:  GENERAL: NAD  EYES: EOMI, PERRLA  NECK: Supple, No JVD  CHEST/LUNG: dec breath sounds at bases  HEART:  S1 , S2 +  ABDOMEN: soft , bs+  EXTREMITIES: trace edema  NEUROLOGY:alert awake oriented      LABS:      137  |  103  |  10  ----------------------------<  119<H>  3.7   |  21<L>  |  0.92    Ca    8.5      2021 07:23  Phos  3.5       Mg     1.90         TPro  7.0  /  Alb      /  TBili      /  DBili      /  AST      /  ALT      /  AlkPhos          Creatinine Trend: 0.92 <--, 0.77 <--, 0.85 <--                        9.9    7.90  )-----------( 457      ( 2021 07:23 )             32.4     Urine Studies:  Urinalysis Basic - ( 2021 10:56 )    Color: Light Brown / Appearance: Clear / S.012 / pH:   Gluc:  / Ketone: Negative  / Bili: Negative / Urobili: <2 mg/dL   Blood:  / Protein: Trace / Nitrite: Negative   Leuk Esterase: Small / RBC: 488 /HPF / WBC 4 /HPF   Sq Epi:  / Non Sq Epi: 1 /HPF / Bacteria: Negative              LIVER FUNCTIONS - ( 2021 07:23 )  Alb: x     / Pro: 7.0 g/dL / ALK PHOS: x     / ALT: x     / AST: x     / GGT: x           PTT - ( 2021 10:30 )  PTT:106.7 sec    Urinalysis Basic - ( 2021 10:56 )    Color: Light Brown / Appearance: Clear / S.012 / pH: x  Gluc: x / Ketone: Negative  / Bili: Negative / Urobili: <2 mg/dL   Blood: x / Protein: Trace / Nitrite: Negative   Leuk Esterase: Small / RBC: 488 /HPF / WBC 4 /HPF   Sq Epi: x / Non Sq Epi: 1 /HPF / Bacteria: Negative        RADIOLOGY & ADDITIONAL TESTS:    Imaging Personally Reviewed:    Consultant(s) Notes Reviewed:      Care Discussed with Consultants/Other Providers:

## 2021-07-26 NOTE — PROGRESS NOTE ADULT - SUBJECTIVE AND OBJECTIVE BOX
DATE OF SERVICE:    Patient was seen and examined on     .Interim events noted.Consultant notes ,Labs,Telemetry reviewed by me    PRESENTING CC:    HPI and HOSPITAL COURSE: HPI:  44 yo F w/ sig PmHx p/w ~3 months of intermittent partial lip swelling, leg swelling, nonproductive cough, and SIERRA. Pt denies CP, palp, skin changes, rashes. She does note ~60 lb weight gain but attributes that to lifestyle changes during the pandemic. Pt has been previously seen by an allergist. Pt stated that she is planning to follow up with a cardiologist.  pt had recent flight to [place] one week ago , no cough , sob on exertion during the trip , been having shortness of breath on exertion associated with orthopnea, pnd past few days after her return from [place]  npo fever , took covid vaccine in feb , started to have cough , intermittent shortness of breath since may 2021, check for covid multiple times in last couple of weak was told neg   pt been allergy / immunology recently - unclear w/u  no miscarriages , no rash , no h/o heavy menstural bleeding, no h/o gi bleed , never received transfusion  family hx of " blood clots" + (24 Jul 2021 16:04)      INTERIM EVENTS:      PMH -reviewed admission note, no change since admission  Heart Failure: Acute [ ] Chronic [ ] Acute on Chronic [ ] Diastolic [ ] Systolic [ ] Combined Systolic and Diastolic[ ]  JAELYN[ ]  ATN[ ]  CKD I [ ] CKDII [ ] CKD III [ ] CKD IV [ ] CKD V [ ] ESRD[ ]  HTN[ ] CVA[ ] DM[ ] COPD[ ] COVID[ ] AF[ ]  PPM[ ] ICD[ ]    MEDICATIONS  (STANDING):  apixaban 10 milliGRAM(s) Oral two times a day  pantoprazole    Tablet 40 milliGRAM(s) Oral before breakfast    MEDICATIONS  (PRN):  oxycodone    5 mG/acetaminophen 325 mG 1 Tablet(s) Oral every 6 hours PRN Severe Pain (7 - 10)            REVIEW OF SYSTEMS:  Constitutional: [ ] fever, [ ]weight loss,  [ ]fatigue  Eyes: [ ] visual changes  Respiratory: [ ]shortness of breath;  [ ] cough, [ ]wheezing, [ ]chills, [ ]hemoptysis  Cardiovascular: [ ] chest pain, [ ]palpitations, [ ]dizziness,  [ ]leg swelling[ ]orthopnea[ ]PND  Gastrointestinal: [ ] abdominal pain, [ ]nausea, [ ]vomiting,  [ ]diarrhea [ ]Constipation [ ]Melena  Genitourinary: [ ] dysuria, [ ] hematuria [ ]Arnold  Neurologic: [ ] headaches [ ] tremors[ ]weakness [ ]Paralysis Right[ ] Left[ ]  Skin: [ ] itching, [ ]burning, [ ] rashes  Endocrine: [ ] heat or cold intolerance  Musculoskeletal: [ ] joint pain or swelling; [ ] muscle, back, or extremity pain  Psychiatric: [ ] depression, [ ]anxiety, [ ]mood swings, or [ ]difficulty sleeping  Hematologic: [ ] easy bruising, [ ] bleeding gums    [x] All remaining systems negative except as per above.   [ ]Unable to obtain.    Vital Signs Last 24 Hrs  T(C): 36.8 (26 Jul 2021 10:04), Max: 36.8 (26 Jul 2021 10:04)  T(F): 98.3 (26 Jul 2021 10:04), Max: 98.3 (26 Jul 2021 10:04)  HR: 88 (26 Jul 2021 10:04) (82 - 96)  BP: 132/81 (26 Jul 2021 10:04) (129/85 - 136/85)  BP(mean): --  RR: 16 (26 Jul 2021 10:04) (16 - 18)  SpO2: 100% (26 Jul 2021 10:04) (99% - 100%)  I&O's Summary      PHYSICAL EXAM:  General: No acute distress BMI-  HEENT: EOMI, PERRL  Neck: Supple, [ ] JVD  Lungs: Equal air entry bilaterally; [ ] rales [ ] wheezing [ ] rhonchi  Heart: Regular rate and rhythm; [ ] murmur   /6 [ ] systolic [ ] diastolic [ ] radiation[ ] rubs [ ]  gallops  Abdomen: Nontender, bowel sounds present  Extremities: No clubbing, cyanosis, [ ] edema [ ]Pulses  equal and intact  Nervous system:  Alert & Oriented X3, no focal deficits  Psychiatric: Normal affect  Skin: No rashes or lesions    LABS:  07-26    137  |  103  |  10  ----------------------------<  119<H>  3.7   |  21<L>  |  0.92    Ca    8.5      26 Jul 2021 07:23  Phos  3.5     07-26  Mg     1.90     07-26    TPro  7.0  /  Alb  x   /  TBili  x   /  DBili  x   /  AST  x   /  ALT  x   /  AlkPhos  x   07-26    Creatinine Trend: 0.92<--, 0.77<--, 0.85<--                        9.9    7.90  )-----------( 457      ( 26 Jul 2021 07:23 )             32.4     PT/INR - ( 24 Jul 2021 19:35 )   PT: 15.2 sec;   INR: 1.35 ratio         PTT - ( 25 Jul 2021 10:30 )  PTT:106.7 sec    Cardiac Enzymes:           RADIOLOGY:    ECG [my interpretation]:    TELEMETRY:Reviewed monitor tracings-    ECHO:    STRESS TEST:    CATHETERIZATION:      IMPRESSION AND PLAN:       DATE OF SERVICE:  07/26/2021  Patient was seen and examined on   07/26/2021  .Interim events noted.Consultant notes ,Labs,Telemetry reviewed by me        PRESENTING CC:Dyspnea leg swelling    HPI and HOSPITAL COURSE: HPI:  44 yo F w/ sig PmHx p/w ~3 months of intermittent partial lip swelling, leg swelling, nonproductive cough, and SIERRA. Pt denies CP, palp, skin changes, rashes. She does note ~60 lb weight gain but attributes that to lifestyle changes during the pandemic. Pt has been previously seen by an allergist. Pt stated that she is planning to follow up with a cardiologist.  pt had recent flight to [place] one week ago , no cough , sob on exertion during the trip , been having shortness of breath on exertion associated with orthopnea, pnd past few days after her return from [place]  npo fever , took covid vaccine in feb , started to have cough , intermittent shortness of breath since may 2021, check for covid multiple times in last couple of weak was told neg   pt been allergy / immunology recently - unclear w/u  no miscarriages , no rash , no h/o heavy menstural bleeding, no h/o gi bleed , never received transfusion  family hx of " blood clots" +        INTERIM EVENTS:Awake alert no dyspnea on Eliquis awaiting CT Abdomen, had headache overnight now better CT Head no acute findings.      PMH -reviewed admission note, no change since admission  Heart Failure: Acute [ ] Chronic [ ] Acute on Chronic [ ] Diastolic [ ] Systolic [ ] Combined Systolic and Diastolic[ ]  JAELYN[ ]  ATN[ ]  CKD I [ ] CKDII [ ] CKD III [ ] CKD IV [ ] CKD V [ ] ESRD[ ]  HTN[ ] CVA[ ] DM[ ] COPD[ ] COVID[ ] AF[ ]  PPM[ ] ICD[ ]    MEDICATIONS  (STANDING):  apixaban 10 milliGRAM(s) Oral two times a day  pantoprazole    Tablet 40 milliGRAM(s) Oral before breakfast    MEDICATIONS  (PRN):  oxycodone    5 mG/acetaminophen 325 mG 1 Tablet(s) Oral every 6 hours PRN Severe Pain (7 - 10)            REVIEW OF SYSTEMS:  Constitutional: [ ] fever, [ ]weight loss,  [ ]fatigue  Eyes: [ ] visual changes  Respiratory: [ ]shortness of breath;  [ ] cough, [ ]wheezing, [ ]chills, [ ]hemoptysis  Cardiovascular: [ ] chest pain, [ ]palpitations, [ ]dizziness,  [ ]leg swelling[ ]orthopnea[ ]PND  Gastrointestinal: [ ] abdominal pain, [ ]nausea, [ ]vomiting,  [ ]diarrhea [ ]Constipation [ ]Melena  Genitourinary: [ ] dysuria, [ ] hematuria [ ]Arnold  Neurologic: [ ] headaches [ ] tremors[ ]weakness [ ]Paralysis Right[ ] Left[ ]  Skin: [ ] itching, [ ]burning, [ ] rashes  Endocrine: [ ] heat or cold intolerance  Musculoskeletal: [ ] joint pain or swelling; [ ] muscle, back, or extremity pain  Psychiatric: [ ] depression, [ ]anxiety, [ ]mood swings, or [ ]difficulty sleeping  Hematologic: [ ] easy bruising, [ ] bleeding gums    [x] All remaining systems negative except as per above.   [ ]Unable to obtain.    Vital Signs Last 24 Hrs  T(C): 36.8 (26 Jul 2021 10:04), Max: 36.8 (26 Jul 2021 10:04)  T(F): 98.3 (26 Jul 2021 10:04), Max: 98.3 (26 Jul 2021 10:04)  HR: 88 (26 Jul 2021 10:04) (82 - 96)  BP: 132/81 (26 Jul 2021 10:04) (129/85 - 136/85)  RR: 16 (26 Jul 2021 10:04) (16 - 18)  SpO2: 100% (26 Jul 2021 10:04) (99% - 100%)  I&O's Summary      PHYSICAL EXAM:  General: No acute distress BMI-30  HEENT: EOMI, PERRL  Neck: Supple, [ ] JVD  Lungs: Equal air entry bilaterally; [ ] rales [ ] wheezing [ ] rhonchi  Heart: Regular rate and rhythm; [x ] murmur   2/6 [x ] systolic [ ] diastolic [ ] radiation[ ] rubs [ ]  gallops  Abdomen: Nontender, bowel sounds present  Extremities: No clubbing, cyanosis, [x ] edema [ ]Pulses  equal and intact  Nervous system:  Alert & Oriented X3, no focal deficits  Psychiatric: Normal affect  Skin: No rashes or lesions    LABS:  07-26    137  |  103  |  10  ----------------------------<  119<H>  3.7   |  21<L>  |  0.92    Ca    8.5      26 Jul 2021 07:23  Phos  3.5     07-26  Mg     1.90     07-26    TPro  7.0  /  Alb  x   /  TBili  x   /  DBili  x   /  AST  x   /  ALT  x   /  AlkPhos  x   07-26    Creatinine Trend: 0.92<--, 0.77<--, 0.85<--                        9.9    7.90  )-----------( 457      ( 26 Jul 2021 07:23 )             32.4     PT/INR - ( 24 Jul 2021 19:35 )   PT: 15.2 sec;   INR: 1.35 ratio         PTT - ( 25 Jul 2021 10:30 )  PTT:106.7 sec    RADIOLOGY:  CT BRAIN   Jul 25 2021   IMPRESSION: No acute intracranial hemorrhage, mass effect,or shift of the midline structures.    EXAM:  CT ANGIO CHEST PULM ART WAWIC    IMPRESSION:  Multiple bilateral segmental and subsegmental pulmonary emboli.Ventricular septal flattening may represent right heart strain. Recommend further evaluation with echocardiogram if clinically warranted.        EXAM:  US DPLX LWR EXT VEINS COMPL BI    IMPRESSION:  Acute above-the-knee left popliteal vein thrombus      TELEMETRY:Reviewed monitor tracings-Sinus rhythm no arrhythmias        IMPRESSION AND PLAN:  44 yo F w/ sig PmHx p/w ~3 months of intermittent partial lip swelling, leg swelling, nonproductive cough, and SIERRA.   Pt denies CP, palp, skin changes, rashes. She does note ~60 lb weight gain but attributes that to lifestyle changes during the pandemic.       Problem/Plan - 1:  ·  Problem: Acute pulmonary embolism without acute cor pulmonale, unspecified pulmonary embolism type.  Plan: Presented with Dyspnea  Acute PE on CTPA  Unprovoked DVT/PE    Awaiting TTE RV strain  Transitioned to Eliquis. 10mg BID for 7 days then 5mg BID    Problem/Plan - 2:  ·  Problem: Acute deep vein thrombosis (DVT) of popliteal vein of left lower extremity.  Plan: Unprovoked DVT  Awaiting CT Abdomen

## 2021-07-27 LAB
ANION GAP SERPL CALC-SCNC: 15 MMOL/L — HIGH (ref 7–14)
BASOPHILS # BLD AUTO: 0.03 K/UL — SIGNIFICANT CHANGE UP (ref 0–0.2)
BASOPHILS NFR BLD AUTO: 0.4 % — SIGNIFICANT CHANGE UP (ref 0–2)
BUN SERPL-MCNC: 12 MG/DL — SIGNIFICANT CHANGE UP (ref 7–23)
CALCIUM SERPL-MCNC: 8.6 MG/DL — SIGNIFICANT CHANGE UP (ref 8.4–10.5)
CHLORIDE SERPL-SCNC: 104 MMOL/L — SIGNIFICANT CHANGE UP (ref 98–107)
CO2 SERPL-SCNC: 22 MMOL/L — SIGNIFICANT CHANGE UP (ref 22–31)
CREAT SERPL-MCNC: 0.83 MG/DL — SIGNIFICANT CHANGE UP (ref 0.5–1.3)
EOSINOPHIL # BLD AUTO: 0.29 K/UL — SIGNIFICANT CHANGE UP (ref 0–0.5)
EOSINOPHIL NFR BLD AUTO: 4 % — SIGNIFICANT CHANGE UP (ref 0–6)
GLUCOSE SERPL-MCNC: 128 MG/DL — HIGH (ref 70–99)
HCT VFR BLD CALC: 31.2 % — LOW (ref 34.5–45)
HGB BLD-MCNC: 9.7 G/DL — LOW (ref 11.5–15.5)
IANC: 4.51 K/UL — SIGNIFICANT CHANGE UP (ref 1.5–8.5)
IGA FLD-MCNC: 189 MG/DL — SIGNIFICANT CHANGE UP (ref 84–499)
IGG FLD-MCNC: 1011 MG/DL — SIGNIFICANT CHANGE UP (ref 610–1660)
IGM SERPL-MCNC: 89 MG/DL — SIGNIFICANT CHANGE UP (ref 35–242)
IMM GRANULOCYTES NFR BLD AUTO: 0.4 % — SIGNIFICANT CHANGE UP (ref 0–1.5)
KAPPA LC SER QL IFE: 2.31 MG/DL — HIGH (ref 0.33–1.94)
KAPPA/LAMBDA FREE LIGHT CHAIN RATIO, SERUM: 1.43 RATIO — SIGNIFICANT CHANGE UP (ref 0.26–1.65)
LAMBDA LC SER QL IFE: 1.61 MG/DL — SIGNIFICANT CHANGE UP (ref 0.57–2.63)
LYMPHOCYTES # BLD AUTO: 1.93 K/UL — SIGNIFICANT CHANGE UP (ref 1–3.3)
LYMPHOCYTES # BLD AUTO: 26.7 % — SIGNIFICANT CHANGE UP (ref 13–44)
MAGNESIUM SERPL-MCNC: 1.9 MG/DL — SIGNIFICANT CHANGE UP (ref 1.6–2.6)
MCHC RBC-ENTMCNC: 24.4 PG — LOW (ref 27–34)
MCHC RBC-ENTMCNC: 31.1 GM/DL — LOW (ref 32–36)
MCV RBC AUTO: 78.6 FL — LOW (ref 80–100)
MONOCYTES # BLD AUTO: 0.44 K/UL — SIGNIFICANT CHANGE UP (ref 0–0.9)
MONOCYTES NFR BLD AUTO: 6.1 % — SIGNIFICANT CHANGE UP (ref 2–14)
NEUTROPHILS # BLD AUTO: 4.51 K/UL — SIGNIFICANT CHANGE UP (ref 1.8–7.4)
NEUTROPHILS NFR BLD AUTO: 62.4 % — SIGNIFICANT CHANGE UP (ref 43–77)
NRBC # BLD: 0 /100 WBCS — SIGNIFICANT CHANGE UP
NRBC # FLD: 0 K/UL — SIGNIFICANT CHANGE UP
PHOSPHATE SERPL-MCNC: 3.6 MG/DL — SIGNIFICANT CHANGE UP (ref 2.5–4.5)
PLATELET # BLD AUTO: 456 K/UL — HIGH (ref 150–400)
POTASSIUM SERPL-MCNC: 4.1 MMOL/L — SIGNIFICANT CHANGE UP (ref 3.5–5.3)
POTASSIUM SERPL-SCNC: 4.1 MMOL/L — SIGNIFICANT CHANGE UP (ref 3.5–5.3)
RBC # BLD: 3.97 M/UL — SIGNIFICANT CHANGE UP (ref 3.8–5.2)
RBC # FLD: 14.9 % — HIGH (ref 10.3–14.5)
SODIUM SERPL-SCNC: 141 MMOL/L — SIGNIFICANT CHANGE UP (ref 135–145)
WBC # BLD: 7.23 K/UL — SIGNIFICANT CHANGE UP (ref 3.8–10.5)
WBC # FLD AUTO: 7.23 K/UL — SIGNIFICANT CHANGE UP (ref 3.8–10.5)

## 2021-07-27 PROCEDURE — 93306 TTE W/DOPPLER COMPLETE: CPT | Mod: 26

## 2021-07-27 PROCEDURE — 99221 1ST HOSP IP/OBS SF/LOW 40: CPT | Mod: GC

## 2021-07-27 RX ORDER — APIXABAN 2.5 MG/1
1 TABLET, FILM COATED ORAL
Qty: 60 | Refills: 0
Start: 2021-07-27 | End: 2021-08-25

## 2021-07-27 RX ORDER — DIAZEPAM 5 MG
2 TABLET ORAL ONCE
Refills: 0 | Status: DISCONTINUED | OUTPATIENT
Start: 2021-07-27 | End: 2021-07-27

## 2021-07-27 RX ADMIN — Medication 2 MILLIGRAM(S): at 21:53

## 2021-07-27 RX ADMIN — APIXABAN 10 MILLIGRAM(S): 2.5 TABLET, FILM COATED ORAL at 06:22

## 2021-07-27 RX ADMIN — PANTOPRAZOLE SODIUM 40 MILLIGRAM(S): 20 TABLET, DELAYED RELEASE ORAL at 06:21

## 2021-07-27 RX ADMIN — APIXABAN 10 MILLIGRAM(S): 2.5 TABLET, FILM COATED ORAL at 17:39

## 2021-07-27 NOTE — PROGRESS NOTE ADULT - SUBJECTIVE AND OBJECTIVE BOX
Pt seen, tolerating apixaban      MEDICATIONS  (STANDING):  apixaban 10 milliGRAM(s) Oral two times a day  pantoprazole    Tablet 40 milliGRAM(s) Oral before breakfast    MEDICATIONS  (PRN):  oxycodone    5 mG/acetaminophen 325 mG 1 Tablet(s) Oral every 6 hours PRN Severe Pain (7 - 10)      ROS  No fever, sweats, chills  No epistaxis, HA, sore throat  No CP, SOB, cough, sputum  No n/v/d, abd pain, melena, hematochezia  No edema  No rash  No anxiety  No back pain, joint pain  No bleeding, bruising  No dysuria, hematuria    Vital Signs Last 24 Hrs  T(C): 36.3 (27 Jul 2021 06:19), Max: 36.8 (26 Jul 2021 21:17)  T(F): 97.4 (27 Jul 2021 06:19), Max: 98.2 (26 Jul 2021 21:17)  HR: 74 (27 Jul 2021 06:19) (74 - 95)  BP: 113/74 (27 Jul 2021 06:19) (113/74 - 125/75)  BP(mean): --  RR: 16 (27 Jul 2021 06:19) (16 - 16)  SpO2: 100% (27 Jul 2021 06:19) (100% - 100%)    PE  NAD  Awake, alert  Anicteric, MMM  RRR  CTAB  Abd soft, NT, ND  No c/c/e  No rash grossly  FROM                          9.7    7.23  )-----------( 456      ( 27 Jul 2021 08:00 )             31.2       07-27    141  |  104  |  12  ----------------------------<  128<H>  4.1   |  22  |  0.83    Ca    8.6      27 Jul 2021 08:00  Phos  3.6     07-27  Mg     1.90     07-27    TPro  7.0  /  Alb  x   /  TBili  x   /  DBili  x   /  AST  x   /  ALT  x   /  AlkPhos  x   07-26

## 2021-07-27 NOTE — PROGRESS NOTE ADULT - SUBJECTIVE AND OBJECTIVE BOX
PATIENT SEEN AND EXAMINED ON :-07/27/2021  DATE OF SERVICE:  07/27/2021           Interim events noted,Labs ,Radiological studies and Cardiology tests reviewed .      HPI:46 yo F w/ sig PmHx p/w ~3 months of intermittent partial lip swelling, leg swelling, nonproductive cough, and SIERRA found to have DVT PE     SUBJECTIVE / OVERNIGHT EVENTS: pt seen and examined,no dyspnea had CT Abdomen-revealing Endometrial mass Awaiting Gyn evaluation for possible biopsy    MEDICATIONS  (STANDING):  apixaban 10 milliGRAM(s) Oral two times a day  pantoprazole    Tablet 40 milliGRAM(s) Oral before breakfast    MEDICATIONS  (PRN):  oxycodone    5 mG/acetaminophen 325 mG 1 Tablet(s) Oral every 6 hours PRN Severe Pain (7 - 10)    Vital Signs Last 24 Hrs  T(C): 36.7 (27 Jul 2021 21:51), Max: 36.8 (27 Jul 2021 16:06)  T(F): 98.1 (27 Jul 2021 21:51), Max: 98.3 (27 Jul 2021 16:06)  HR: 87 (27 Jul 2021 21:51) (74 - 89)  BP: 120/81 (27 Jul 2021 21:51) (113/74 - 127/88)  RR: 16 (27 Jul 2021 21:51) (16 - 16)  SpO2: 100% (27 Jul 2021 21:51) (100% - 100%)    PHYSICAL EXAM:  General: No acute distress BMI-30  HEENT: EOMI, PERRL  Neck: Supple, [ ] JVD  Lungs: Equal air entry bilaterally; [ ] rales [ ] wheezing [ ] rhonchi  Heart: Regular rate and rhythm; [x ] murmur   2/6 [x ] systolic [ ] diastolic [ ] radiation[ ] rubs [ ]  gallops  Abdomen: Nontender, bowel sounds present  Extremities: No clubbing, cyanosis, [x ] edema [ ]Pulses  equal and intact  Nervous system:  Alert & Oriented X3, no focal deficits  Psychiatric: Normal affect  Skin: No rashes or lesions      LABS:  07-27    < end of copied text >    141  |  104  |  12  ----------------------------<  128<H>  4.1   |  22  |  0.83    Ca    8.6      27 Jul 2021 08:00  Phos  3.6     07-27  Mg     1.90     07-27    TPro  7.0  /  Alb      /  TBili      /  DBili      /  AST      /  ALT      /  AlkPhos      07-26    Creatinine Trend: 0.83 <--, 0.92 <--, 0.77 <--, 0.85 <--                        9.7    7.23  )-----------( 456      ( 27 Jul 2021 08:00 )             31.2       RADIOLOGY:   CT ABDOMEN AND PELVIS IC     Jul 25 2021   IMPRESSION:  *  Dominant pelvic mass centered on the endometrium, concerning for endometrial malignancy. Lesion appears to invade the myometrium. No evidence of parametrial spread.  *  No evidence of metastatic disease or suspicious lymphadenopathy in the abdomen and pelvis.

## 2021-07-27 NOTE — CONSULT NOTE ADULT - ASSESSMENT
Patient is a 45y  who presented to [facility] ED with cough, SIERRA, and LE edema on 21 subsequently admitted to medicine service with LLE DVT and PE. GYN consulted for incidental uterine mass identified on CTAP.    -Recommend pelvic MRI to further evaluation mass  -Defer EMBx as patient is currently menstruating, likely to be performed on outpatient basis  -Final recommendations pending MRI    Ema Neff, PGY4  Patient seen and d/w Dr. Ashraf

## 2021-07-27 NOTE — PROGRESS NOTE ADULT - SUBJECTIVE AND OBJECTIVE BOX
SUBJECTIVE / OVERNIGHT EVENTS: pt seen and examined    MEDICATIONS  (STANDING):  apixaban 10 milliGRAM(s) Oral two times a day  pantoprazole    Tablet 40 milliGRAM(s) Oral before breakfast    MEDICATIONS  (PRN):  oxycodone    5 mG/acetaminophen 325 mG 1 Tablet(s) Oral every 6 hours PRN Severe Pain (7 - 10)    Vital Signs Last 24 Hrs  T(C): 36.7 (2021 21:51), Max: 36.8 (2021 16:06)  T(F): 98.1 (2021 21:51), Max: 98.3 (2021 16:06)  HR: 87 (2021 21:51) (74 - 89)  BP: 120/81 (2021 21:51) (113/74 - 127/88)  BP(mean): --  RR: 16 (2021 21:51) (16 - 16)  SpO2: 100% (2021 21:51) (100% - 100%)    Constitutional: No fever, fatigue  Skin: No rash.  Eyes: No recent vision problems or eye pain.  ENT: No congestion, ear pain, or sore throat.  Cardiovascular: No chest pain or palpation.  Respiratory: No cough, shortness of breath, congestion, or wheezing.  Gastrointestinal: No abdominal pain, nausea, vomiting, or diarrhea.  Genitourinary: No dysuria.  Musculoskeletal: No joint swelling.  Neurologic: No headache.    PHYSICAL EXAM:  GENERAL: NAD  EYES: EOMI, PERRLA  NECK: Supple, No JVD  CHEST/LUNG: dec breath sounds at bases  HEART:  S1 , S2 +  ABDOMEN: soft , bs+  EXTREMITIES: trace edema  NEUROLOGY:alert awake oriented    LABS:      141  |  104  |  12  ----------------------------<  128<H>  4.1   |  22  |  0.83    Ca    8.6      2021 08:00  Phos  3.6       Mg     1.90         TPro  7.0  /  Alb      /  TBili      /  DBili      /  AST      /  ALT      /  AlkPhos          Creatinine Trend: 0.83 <--, 0.92 <--, 0.77 <--, 0.85 <--                        9.7    7.23  )-----------( 456      ( 2021 08:00 )             31.2     Urine Studies:  Urinalysis Basic - ( 2021 10:56 )    Color: Light Brown / Appearance: Clear / S.012 / pH:   Gluc:  / Ketone: Negative  / Bili: Negative / Urobili: <2 mg/dL   Blood:  / Protein: Trace / Nitrite: Negative   Leuk Esterase: Small / RBC: 488 /HPF / WBC 4 /HPF   Sq Epi:  / Non Sq Epi: 1 /HPF / Bacteria: Negative              LIVER FUNCTIONS - ( 2021 07:23 )  Alb: x     / Pro: 7.0 g/dL / ALK PHOS: x     / ALT: x     / AST: x     / GGT: x                 RADIOLOGY & ADDITIONAL TESTS:    Imaging Personally Reviewed:    Consultant(s) Notes Reviewed:      Care Discussed with Consultants/Other Providers:

## 2021-07-27 NOTE — CONSULT NOTE ADULT - SUBJECTIVE AND OBJECTIVE BOX
HPI    Patient is a 45y  who presented to [facility] ED with cough, SIERRA, and LE edema subsequently admitted to medicine service with LLE DVT and PE. GYN consulted for incidental uterine mass identified on CTAP, as recommended by hematology to r/o malignancy in setting of seemingly unprovoked VTE. Patient reports noticing the development of a "ball" in her lower abdomen/pelvis over the last several months as well as increasingly heavier menses. ROS + recent 10lb weight gain since May (however patient reports prednisone use during this time), otherwise negative for fevers, chills, changes in bladder or bowel habits, weight loss.      OB/GYN Provider: Prior OBGYN retired, last seen in 2018, Pap wnl at that time per patient    OBHx: FT NSVDx2, SABx1, TOPx1 (D&C)  GYNHx: Fibroid uterus (incidentally identified during pregnancy), Currently menstruating, Menses regular q24-28d x5-7d, Not currently sexually active, reports 1x abormal Pap requiring colposcopy as a teenager  PAST MEDICAL & SURGICAL HISTORY: HTN (previously on amlodipine), D&C, L arm surgery  FAMILY HISTORY: Denies    MEDICATIONS  (STANDING):  apixaban 10 milliGRAM(s) Oral two times a day  pantoprazole    Tablet 40 milliGRAM(s) Oral before breakfast    MEDICATIONS  (PRN):  oxycodone    5 mG/acetaminophen 325 mG 1 Tablet(s) Oral every 6 hours PRN Severe Pain (7 - 10)    Allergies:  erythromycin (Hives)  macrolide antibiotics (Other)    Psych Hx: ADHD (previously on wellbutrin)  Social Hx: Denies  ROS Negative unless otherwise noted in HPI    Vital Signs Last 24 Hrs  T(C): 36.3 (2021 06:19), Max: 36.8 (2021 21:17)  T(F): 97.4 (2021 06:19), Max: 98.2 (2021 21:17)  HR: 74 (2021 06:19) (74 - 95)  BP: 113/74 (2021 06:19) (113/74 - 125/75)  BP(mean): --  RR: 16 (2021 06:19) (16 - 16)  SpO2: 100% (2021 06:19) (100% - 100%)    PHYSICAL EXAM:  Constitutional: alert and oriented x 3  Cardiopulmonary: Please refer to primary team examination  Gastrointestinal: soft, non tender  Genitourinary: NEFG,  SSE: Minimal dark red in the vault   SVE: Cervix closed/ long, no CMT, uterus 12cm, anteverted, mobile, NTTP, adnexa non tender, no palpable masses    LABS:                        9.7    7.23  )-----------( 456      ( 2021 08:00 )             31.2         141  |  104  |  12  ----------------------------<  128<H>  4.1   |  22  |  0.83    Ca    8.6      2021 08:00  Phos  3.6       Mg     1.90         TPro  7.0  /  Alb  x   /  TBili  x   /  DBili  x   /  AST  x   /  ALT  x   /  AlkPhos  x                 RADIOLOGY & ADDITIONAL STUDIES:        EXAM:  CT ABDOMEN AND PELVIS IC        PROCEDURE DATE:  2021         INTERPRETATION:  CLINICAL INFORMATION: Deep venous thrombosis and pulmonary embolus. Evaluate for malignancy.    COMPARISON: Chest CT scan 2021. Ultrasound 2021.    CONTRAST/COMPLICATIONS:  IV Contrast: Omnipaque 350  90 cc administered   10 cc discarded  Oral Contrast: NONE  Complications: None reported at time of study completion    PROCEDURE:  CT of the Abdomen and Pelvis was performed.  Sagittal and coronal reformats were performed.    FINDINGS:  LOWER CHEST: Partially visualized pulmonary emboli in the bilateral lower lobes    LIVER: 4 mm hypodensity at the junction of segments VIII and SHANKAR, too small to characterize, likely a cyst.  BILE DUCTS: Normal caliber.  GALLBLADDER: Cholelithiasis.  SPLEEN: Within normal limits.  PANCREAS: Within normal limits.  ADRENALS: Within normal limits.  KIDNEYS/URETERS: Within normal limits. No hydronephrosis.    BLADDER: Within normal limits.  REPRODUCTIVE ORGANS: Dominant intrauterine mass centered on the endometrium measuring 11.4 x 9.7 x 11.4 cm tapering in the lower uterine segment above the level of the cervix. The margins of this mass in relation to the myometrium are ill-defined. Findings are concerning for endometrial malignancy. No evidence of parametrial spread. Bilateral adnexa are unremarkable.    BOWEL: No bowel obstruction. Appendix is normal. Prominent distention of the stomach with fluid and debris. Small hiatal hernia. Gastroesophageal reflux.  PERITONEUM: No ascites. No evidence of peritoneal metastases.  VESSELS: Within normal limits.  RETROPERITONEUM/LYMPH NODES: No lymphadenopathy.  ABDOMINAL WALL: Within normal limits.  BONES: Within normal limits.    IMPRESSION:  *  Dominant pelvic mass centered on the endometrium, concerning for endometrial malignancy. Lesion appears to invade the myometrium. No evidence of parametrial spread.  *  No evidence of metastatic disease or suspicious lymphadenopathy in the abdomen and pelvis.      --- End of Report ---              AUBRIE LEIGH MD; Attending Radiologist  This document has been electronically signed. 2021  9:47AM

## 2021-07-27 NOTE — CONSULT NOTE ADULT - ATTENDING COMMENTS
Agree with above findings and plan of care. Extensive rev with pt her concerns. pt is a GYN ONC fertility RN

## 2021-07-28 LAB
ANA TITR SER: NEGATIVE — SIGNIFICANT CHANGE UP
ANION GAP SERPL CALC-SCNC: 11 MMOL/L — SIGNIFICANT CHANGE UP (ref 7–14)
APTT BLD: 130 SEC — SIGNIFICANT CHANGE UP (ref 27–36.3)
APTT BLD: 32.3 SEC — SIGNIFICANT CHANGE UP (ref 27–36.3)
AUTO DIFF PNL BLD: NEGATIVE — SIGNIFICANT CHANGE UP
BASOPHILS # BLD AUTO: 0.02 K/UL — SIGNIFICANT CHANGE UP (ref 0–0.2)
BASOPHILS NFR BLD AUTO: 0.3 % — SIGNIFICANT CHANGE UP (ref 0–2)
BUN SERPL-MCNC: 13 MG/DL — SIGNIFICANT CHANGE UP (ref 7–23)
C-ANCA SER-ACNC: NEGATIVE — SIGNIFICANT CHANGE UP
CALCIUM SERPL-MCNC: 8.3 MG/DL — LOW (ref 8.4–10.5)
CHLORIDE SERPL-SCNC: 106 MMOL/L — SIGNIFICANT CHANGE UP (ref 98–107)
CO2 SERPL-SCNC: 19 MMOL/L — LOW (ref 22–31)
CREAT SERPL-MCNC: 0.8 MG/DL — SIGNIFICANT CHANGE UP (ref 0.5–1.3)
EOSINOPHIL # BLD AUTO: 0.25 K/UL — SIGNIFICANT CHANGE UP (ref 0–0.5)
EOSINOPHIL NFR BLD AUTO: 3.7 % — SIGNIFICANT CHANGE UP (ref 0–6)
GLUCOSE SERPL-MCNC: 125 MG/DL — HIGH (ref 70–99)
HCT VFR BLD CALC: 31 % — LOW (ref 34.5–45)
HCT VFR BLD CALC: 31.8 % — LOW (ref 34.5–45)
HGB BLD-MCNC: 9.6 G/DL — LOW (ref 11.5–15.5)
HGB BLD-MCNC: 9.7 G/DL — LOW (ref 11.5–15.5)
IANC: 4.08 K/UL — SIGNIFICANT CHANGE UP (ref 1.5–8.5)
IMM GRANULOCYTES NFR BLD AUTO: 0.1 % — SIGNIFICANT CHANGE UP (ref 0–1.5)
INR BLD: 1.66 RATIO — HIGH (ref 0.88–1.16)
LYMPHOCYTES # BLD AUTO: 1.98 K/UL — SIGNIFICANT CHANGE UP (ref 1–3.3)
LYMPHOCYTES # BLD AUTO: 29.1 % — SIGNIFICANT CHANGE UP (ref 13–44)
MAGNESIUM SERPL-MCNC: 1.8 MG/DL — SIGNIFICANT CHANGE UP (ref 1.6–2.6)
MCHC RBC-ENTMCNC: 23.7 PG — LOW (ref 27–34)
MCHC RBC-ENTMCNC: 24.4 PG — LOW (ref 27–34)
MCHC RBC-ENTMCNC: 30.5 GM/DL — LOW (ref 32–36)
MCHC RBC-ENTMCNC: 31 GM/DL — LOW (ref 32–36)
MCV RBC AUTO: 77.8 FL — LOW (ref 80–100)
MCV RBC AUTO: 78.9 FL — LOW (ref 80–100)
MONOCYTES # BLD AUTO: 0.46 K/UL — SIGNIFICANT CHANGE UP (ref 0–0.9)
MONOCYTES NFR BLD AUTO: 6.8 % — SIGNIFICANT CHANGE UP (ref 2–14)
MPO AB + PR3 PNL SER: SIGNIFICANT CHANGE UP
NEUTROPHILS # BLD AUTO: 4.08 K/UL — SIGNIFICANT CHANGE UP (ref 1.8–7.4)
NEUTROPHILS NFR BLD AUTO: 60 % — SIGNIFICANT CHANGE UP (ref 43–77)
NRBC # BLD: 0 /100 WBCS — SIGNIFICANT CHANGE UP
NRBC # BLD: 0 /100 WBCS — SIGNIFICANT CHANGE UP
NRBC # FLD: 0 K/UL — SIGNIFICANT CHANGE UP
NRBC # FLD: 0 K/UL — SIGNIFICANT CHANGE UP
P-ANCA SER-ACNC: NEGATIVE — SIGNIFICANT CHANGE UP
PHOSPHATE SERPL-MCNC: 3.6 MG/DL — SIGNIFICANT CHANGE UP (ref 2.5–4.5)
PLATELET # BLD AUTO: 432 K/UL — HIGH (ref 150–400)
PLATELET # BLD AUTO: 485 K/UL — HIGH (ref 150–400)
POTASSIUM SERPL-MCNC: 4.3 MMOL/L — SIGNIFICANT CHANGE UP (ref 3.5–5.3)
POTASSIUM SERPL-SCNC: 4.3 MMOL/L — SIGNIFICANT CHANGE UP (ref 3.5–5.3)
PROTHROM AB SERPL-ACNC: 18.5 SEC — HIGH (ref 10.6–13.6)
RBC # BLD: 3.93 M/UL — SIGNIFICANT CHANGE UP (ref 3.8–5.2)
RBC # BLD: 4.09 M/UL — SIGNIFICANT CHANGE UP (ref 3.8–5.2)
RBC # FLD: 14.9 % — HIGH (ref 10.3–14.5)
RBC # FLD: 15 % — HIGH (ref 10.3–14.5)
SODIUM SERPL-SCNC: 136 MMOL/L — SIGNIFICANT CHANGE UP (ref 135–145)
WBC # BLD: 6.8 K/UL — SIGNIFICANT CHANGE UP (ref 3.8–10.5)
WBC # BLD: 7.28 K/UL — SIGNIFICANT CHANGE UP (ref 3.8–10.5)
WBC # FLD AUTO: 6.8 K/UL — SIGNIFICANT CHANGE UP (ref 3.8–10.5)
WBC # FLD AUTO: 7.28 K/UL — SIGNIFICANT CHANGE UP (ref 3.8–10.5)

## 2021-07-28 RX ORDER — POLYETHYLENE GLYCOL 3350 17 G/17G
17 POWDER, FOR SOLUTION ORAL DAILY
Refills: 0 | Status: DISCONTINUED | OUTPATIENT
Start: 2021-07-28 | End: 2021-07-31

## 2021-07-28 RX ORDER — DIAZEPAM 5 MG
2 TABLET ORAL ONCE
Refills: 0 | Status: DISCONTINUED | OUTPATIENT
Start: 2021-07-28 | End: 2021-07-28

## 2021-07-28 RX ORDER — HEPARIN SODIUM 5000 [USP'U]/ML
4000 INJECTION INTRAVENOUS; SUBCUTANEOUS EVERY 6 HOURS
Refills: 0 | Status: DISCONTINUED | OUTPATIENT
Start: 2021-07-28 | End: 2021-07-30

## 2021-07-28 RX ORDER — SENNA PLUS 8.6 MG/1
2 TABLET ORAL AT BEDTIME
Refills: 0 | Status: DISCONTINUED | OUTPATIENT
Start: 2021-07-28 | End: 2021-07-31

## 2021-07-28 RX ORDER — HEPARIN SODIUM 5000 [USP'U]/ML
9000 INJECTION INTRAVENOUS; SUBCUTANEOUS EVERY 6 HOURS
Refills: 0 | Status: DISCONTINUED | OUTPATIENT
Start: 2021-07-28 | End: 2021-07-30

## 2021-07-28 RX ORDER — HEPARIN SODIUM 5000 [USP'U]/ML
INJECTION INTRAVENOUS; SUBCUTANEOUS
Qty: 25000 | Refills: 0 | Status: DISCONTINUED | OUTPATIENT
Start: 2021-07-28 | End: 2021-07-30

## 2021-07-28 RX ADMIN — HEPARIN SODIUM 0 UNIT(S)/HR: 5000 INJECTION INTRAVENOUS; SUBCUTANEOUS at 19:24

## 2021-07-28 RX ADMIN — HEPARIN SODIUM 1600 UNIT(S)/HR: 5000 INJECTION INTRAVENOUS; SUBCUTANEOUS at 20:27

## 2021-07-28 RX ADMIN — SENNA PLUS 2 TABLET(S): 8.6 TABLET ORAL at 21:21

## 2021-07-28 RX ADMIN — HEPARIN SODIUM 1900 UNIT(S)/HR: 5000 INJECTION INTRAVENOUS; SUBCUTANEOUS at 12:16

## 2021-07-28 RX ADMIN — PANTOPRAZOLE SODIUM 40 MILLIGRAM(S): 20 TABLET, DELAYED RELEASE ORAL at 06:32

## 2021-07-28 RX ADMIN — Medication 2 MILLIGRAM(S): at 21:22

## 2021-07-28 RX ADMIN — APIXABAN 10 MILLIGRAM(S): 2.5 TABLET, FILM COATED ORAL at 06:29

## 2021-07-28 NOTE — PROGRESS NOTE ADULT - SUBJECTIVE AND OBJECTIVE BOX
Patient seen and examined at bedside. pt feels well. no active complaints     MEDICATIONS  (STANDING):  pantoprazole    Tablet 40 milliGRAM(s) Oral before breakfast    MEDICATIONS  (PRN):  oxycodone    5 mG/acetaminophen 325 mG 1 Tablet(s) Oral every 6 hours PRN Severe Pain (7 - 10)        Vital Signs Last 24 Hrs  T(C): 36.7 (28 Jul 2021 06:30), Max: 36.8 (27 Jul 2021 16:06)  T(F): 98 (28 Jul 2021 06:30), Max: 98.3 (27 Jul 2021 16:06)  HR: 80 (28 Jul 2021 06:30) (80 - 89)  BP: 127/78 (28 Jul 2021 06:30) (120/81 - 127/88)  BP(mean): --  RR: 16 (28 Jul 2021 06:30) (16 - 16)  SpO2: 100% (28 Jul 2021 06:30) (100% - 100%)      PHYSICAL EXAM:     GENERAL:  Appears stated age, well-groomed  HEENT:  NC/AT,  conjunctivae clear and pink  CHEST:  CTA b/l  HEART:  S1 s2+   ABDOMEN:  Soft, non-tender, non-distended  NEURO:  Alert, oriented, no asterixis                            9.6    6.80  )-----------( 432      ( 28 Jul 2021 07:43 )             31.0       07-28    136  |  106  |  13  ----------------------------<  125<H>  4.3   |  19<L>  |  0.80    Ca    8.3<L>      28 Jul 2021 07:43  Phos  3.6     07-28  Mg     1.80     07-28

## 2021-07-28 NOTE — PROGRESS NOTE ADULT - SUBJECTIVE AND OBJECTIVE BOX
SUBJECTIVE / OVERNIGHT EVENTS: pt seen and examined    MEDICATIONS  (STANDING):  heparin  Infusion.  Unit(s)/Hr (19 mL/Hr) IV Continuous <Continuous>  pantoprazole    Tablet 40 milliGRAM(s) Oral before breakfast  polyethylene glycol 3350 17 Gram(s) Oral daily  senna 2 Tablet(s) Oral at bedtime    MEDICATIONS  (PRN):  heparin   Injectable 9000 Unit(s) IV Push every 6 hours PRN For aPTT less than 40  heparin   Injectable 4000 Unit(s) IV Push every 6 hours PRN For aPTT between 40 - 57  oxycodone    5 mG/acetaminophen 325 mG 1 Tablet(s) Oral every 6 hours PRN Severe Pain (7 - 10)    Vital Signs Last 24 Hrs  T(C): 36.7 (2021 21:18), Max: 36.7 (2021 06:30)  T(F): 98 (2021 21:18), Max: 98 (2021 06:30)  HR: 82 (2021 21:18) (79 - 84)  BP: 127/84 (2021 21:18) (127/78 - 132/86)  BP(mean): --  RR: 18 (2021 21:18) (16 - 18)  SpO2: 100% (2021 21:18) (100% - 100%)      Constitutional: No fever, fatigue  Skin: No rash.  Eyes: No recent vision problems or eye pain.  ENT: No congestion, ear pain, or sore throat.  Cardiovascular: No chest pain or palpation.  Respiratory: No cough, shortness of breath, congestion, or wheezing.  Gastrointestinal: No abdominal pain, nausea, vomiting, or diarrhea.  Genitourinary: No dysuria.  Musculoskeletal: No joint swelling.  Neurologic: No headache.    PHYSICAL EXAM:  GENERAL: NAD  EYES: EOMI, PERRLA  NECK: Supple, No JVD  CHEST/LUNG: dec breath sounds at bases  HEART:  S1 , S2 +  ABDOMEN: soft , bs+  EXTREMITIES: trace edema  NEUROLOGY:alert awake oriented    LABS:      136  |  106  |  13  ----------------------------<  125<H>  4.3   |  19<L>  |  0.80    Ca    8.3<L>      2021 07:43  Phos  3.6       Mg     1.80           Creatinine Trend: 0.80 <--, 0.83 <--, 0.92 <--, 0.77 <--, 0.85 <--                        9.7    7.28  )-----------( 485      ( 2021 18:32 )             31.8     Urine Studies:  Urinalysis Basic - ( 2021 10:56 )    Color: Light Brown / Appearance: Clear / S.012 / pH:   Gluc:  / Ketone: Negative  / Bili: Negative / Urobili: <2 mg/dL   Blood:  / Protein: Trace / Nitrite: Negative   Leuk Esterase: Small / RBC: 488 /HPF / WBC 4 /HPF   Sq Epi:  / Non Sq Epi: 1 /HPF / Bacteria: Negative                PT/INR - ( 2021 09:15 )   PT: 18.5 sec;   INR: 1.66 ratio         PTT - ( 2021 18:32 )  PTT:130 sec    LIVER FUNCTIONS - ( 2021 07:23 )  Alb: x     / Pro: 7.0 g/dL / ALK PHOS: x     / ALT: x     / AST: x     / GGT: x                 RADIOLOGY & ADDITIONAL TESTS:    Imaging Personally Reviewed:    Consultant(s) Notes Reviewed:      Care Discussed with Consultants/Other Providers:

## 2021-07-28 NOTE — PROGRESS NOTE ADULT - SUBJECTIVE AND OBJECTIVE BOX
PATIENT SEEN AND EXAMINED ON :-07/28/2021  DATE OF SERVICE: 07/28/2021      Interim events noted,Labs ,Radiological studies and Cardiology tests reviewed .      HPI:46 yo F w/ sig PmHx p/w ~3 months of intermittent partial lip swelling, leg swelling, nonproductive cough, and SIERRA found to have DVT PE     SUBJECTIVE / OVERNIGHT EVENTS: pt seen and examined,no dyspnea had CT Abdomen-revealing Endometrial mass  seen by Gyn ordered MRI-Pelvis to define endometrial mass biopsy deferred as patient menstrauting but wants biopsy while in house    MEDICATIONS  (STANDING):  apixaban 10 milliGRAM(s) Oral two times a day  pantoprazole    Tablet 40 milliGRAM(s) Oral before breakfast    MEDICATIONS  (PRN):  oxycodone    5 mG/acetaminophen 325 mG 1 Tablet(s) Oral every 6 hours PRN Severe Pain (7 - 10)        Vital Signs Last 24 Hrs  T(C): 36.7 (28 Jul 2021 21:18), Max: 36.7 (28 Jul 2021 06:30)  T(F): 98 (28 Jul 2021 21:18), Max: 98 (28 Jul 2021 06:30)  HR: 82 (28 Jul 2021 21:18) (79 - 84)  BP: 127/84 (28 Jul 2021 21:18) (127/78 - 132/86)  RR: 18 (28 Jul 2021 21:18) (16 - 18)  SpO2: 100% (28 Jul 2021 21:18) (100% - 100%)    PHYSICAL EXAM:  General: No acute distress BMI-30  HEENT: EOMI, PERRL  Neck: Supple, [ ] JVD  Lungs: Equal air entry bilaterally; [ ] rales [ ] wheezing [ ] rhonchi  Heart: Regular rate and rhythm; [x ] murmur   2/6 [x ] systolic [ ] diastolic [ ] radiation[ ] rubs [ ]  gallops  Abdomen: Nontender, bowel sounds present  Extremities: No clubbing, cyanosis, [x ] edema [ ]Pulses  equal and intact  Nervous system:  Alert & Oriented X3, no focal deficits  Psychiatric: Normal affect  Skin: No rashes or lesions      LABS:  07-28    136  |  106  |  13  ----------------------------<  125<H>  4.3   |  19<L>  |  0.80    Ca    8.3<L>      28 Jul 2021 07:43  Phos  3.6     07-28  Mg     1.80     07-28      Creatinine Trend: 0.80 <--, 0.83 <--, 0.92 <--, 0.77 <--, 0.85 <--                        9.7    7.28  )-----------( 485      ( 28 Jul 2021 18:32 )             31.8     RADIOLOGY:   CT ABDOMEN AND PELVIS IC     Jul 25 2021   IMPRESSION:  *  Dominant pelvic mass centered on the endometrium, concerning for endometrial malignancy. Lesion appears to invade the myometrium. No evidence of parametrial spread.  *  No evidence of metastatic disease or suspicious lymphadenopathy in the abdomen and pelvis.    ECHOCARDIOGRAM:  Study Date: 7/27/2021  CONCLUSIONS:  1. Normal trileaflet aortic valve.  2. Normal left ventricular internal dimensions and wall thicknesses.  3. Normal left ventricular systolic function. No segmental wall motion abnormalities.EF 71%  4. Normal right ventricular size and function.  --------------------------------------------------------------------          Assessment and Plan:   · Assessment	  46 yo F w/ sig PmHx p/w ~3 months of intermittent partial lip swelling, leg swelling, nonproductive cough, and SIERRA.   Pt denies CP, palp, skin changes, rashes. She does note ~60 lb weight gain but attributes that to lifestyle changes during the pandemic.       Problem/Plan - 1:  ·  Problem: Acute pulmonary embolism without acute cor pulmonale, unspecified pulmonary embolism type.  Plan: Presented with Dyspnea  Acute PE on CTPA  Unprovoked DVT/PE   TTE-EF 71% No  RV strain      Problem/Plan - 2:  ·  Problem: Acute deep vein thrombosis (DVT) of popliteal vein of left lower extremity.  Plan: Unprovoked DVT   CT Abdomen revealing endometrial mass  Gyn consult for possible biopsy pending MRI Pelvis  Will resume on Heparin gtt pending biopsy

## 2021-07-29 DIAGNOSIS — N94.89 OTHER SPECIFIED CONDITIONS ASSOCIATED WITH FEMALE GENITAL ORGANS AND MENSTRUAL CYCLE: ICD-10-CM

## 2021-07-29 DIAGNOSIS — I82.432 ACUTE EMBOLISM AND THROMBOSIS OF LEFT POPLITEAL VEIN: ICD-10-CM

## 2021-07-29 DIAGNOSIS — I26.99 OTHER PULMONARY EMBOLISM WITHOUT ACUTE COR PULMONALE: ICD-10-CM

## 2021-07-29 LAB
% ALBUMIN: 46.5 % — SIGNIFICANT CHANGE UP
% ALPHA 1: 4.3 % — SIGNIFICANT CHANGE UP
% ALPHA 2: 17 % — SIGNIFICANT CHANGE UP
% BETA: 17.7 % — SIGNIFICANT CHANGE UP
% GAMMA: 14.5 % — SIGNIFICANT CHANGE UP
ALBUMIN SERPL ELPH-MCNC: 3.26 G/DL — LOW (ref 3.3–4.4)
ALBUMIN/GLOB SERPL ELPH: 0.9 RATIO — SIGNIFICANT CHANGE UP
ALPHA1 GLOB SERPL ELPH-MCNC: 0.3 G/DL — SIGNIFICANT CHANGE UP (ref 0.1–0.3)
ALPHA2 GLOB SERPL ELPH-MCNC: 1.2 G/DL — HIGH (ref 0.6–1)
ANION GAP SERPL CALC-SCNC: 15 MMOL/L — HIGH (ref 7–14)
APTT BLD: 149.2 SEC — CRITICAL HIGH (ref 27–36.3)
APTT BLD: 153.4 SEC — CRITICAL HIGH (ref 27–36.3)
APTT BLD: 56 SEC — HIGH (ref 27–36.3)
B-GLOBULIN SERPL ELPH-MCNC: 1.24 G/DL — HIGH (ref 0.6–1.1)
BASOPHILS # BLD AUTO: 0.04 K/UL — SIGNIFICANT CHANGE UP (ref 0–0.2)
BASOPHILS NFR BLD AUTO: 0.5 % — SIGNIFICANT CHANGE UP (ref 0–2)
BUN SERPL-MCNC: 12 MG/DL — SIGNIFICANT CHANGE UP (ref 7–23)
CALCIUM SERPL-MCNC: 8.7 MG/DL — SIGNIFICANT CHANGE UP (ref 8.4–10.5)
CHLORIDE SERPL-SCNC: 101 MMOL/L — SIGNIFICANT CHANGE UP (ref 98–107)
CO2 SERPL-SCNC: 19 MMOL/L — LOW (ref 22–31)
CREAT SERPL-MCNC: 0.82 MG/DL — SIGNIFICANT CHANGE UP (ref 0.5–1.3)
DNA PLOIDY SPEC FC-IMP: SIGNIFICANT CHANGE UP
EOSINOPHIL # BLD AUTO: 0.33 K/UL — SIGNIFICANT CHANGE UP (ref 0–0.5)
EOSINOPHIL NFR BLD AUTO: 4.2 % — SIGNIFICANT CHANGE UP (ref 0–6)
GAMMA GLOBULIN: 1.02 G/DL — SIGNIFICANT CHANGE UP (ref 0.7–1.7)
GLUCOSE SERPL-MCNC: 137 MG/DL — HIGH (ref 70–99)
HCT VFR BLD CALC: 30.4 % — LOW (ref 34.5–45)
HGB BLD-MCNC: 9.3 G/DL — LOW (ref 11.5–15.5)
IANC: 4.15 K/UL — SIGNIFICANT CHANGE UP (ref 1.5–8.5)
IMM GRANULOCYTES NFR BLD AUTO: 0.3 % — SIGNIFICANT CHANGE UP (ref 0–1.5)
LYMPHOCYTES # BLD AUTO: 2.9 K/UL — SIGNIFICANT CHANGE UP (ref 1–3.3)
LYMPHOCYTES # BLD AUTO: 36.7 % — SIGNIFICANT CHANGE UP (ref 13–44)
MAGNESIUM SERPL-MCNC: 1.7 MG/DL — SIGNIFICANT CHANGE UP (ref 1.6–2.6)
MCHC RBC-ENTMCNC: 23.7 PG — LOW (ref 27–34)
MCHC RBC-ENTMCNC: 30.6 GM/DL — LOW (ref 32–36)
MCV RBC AUTO: 77.6 FL — LOW (ref 80–100)
MONOCYTES # BLD AUTO: 0.46 K/UL — SIGNIFICANT CHANGE UP (ref 0–0.9)
MONOCYTES NFR BLD AUTO: 5.8 % — SIGNIFICANT CHANGE UP (ref 2–14)
NEUTROPHILS # BLD AUTO: 4.15 K/UL — SIGNIFICANT CHANGE UP (ref 1.8–7.4)
NEUTROPHILS NFR BLD AUTO: 52.5 % — SIGNIFICANT CHANGE UP (ref 43–77)
NRBC # BLD: 0 /100 WBCS — SIGNIFICANT CHANGE UP
NRBC # FLD: 0 K/UL — SIGNIFICANT CHANGE UP
PHOSPHATE SERPL-MCNC: 3.7 MG/DL — SIGNIFICANT CHANGE UP (ref 2.5–4.5)
PLATELET # BLD AUTO: 449 K/UL — HIGH (ref 150–400)
POTASSIUM SERPL-MCNC: 3.9 MMOL/L — SIGNIFICANT CHANGE UP (ref 3.5–5.3)
POTASSIUM SERPL-SCNC: 3.9 MMOL/L — SIGNIFICANT CHANGE UP (ref 3.5–5.3)
PROT PATTERN SERPL ELPH-IMP: SIGNIFICANT CHANGE UP
PROT SERPL-MCNC: 7 G/DL — SIGNIFICANT CHANGE UP
PTR INTERPRETATION: SIGNIFICANT CHANGE UP
RBC # BLD: 3.92 M/UL — SIGNIFICANT CHANGE UP (ref 3.8–5.2)
RBC # FLD: 14.9 % — HIGH (ref 10.3–14.5)
SODIUM SERPL-SCNC: 135 MMOL/L — SIGNIFICANT CHANGE UP (ref 135–145)
WBC # BLD: 7.9 K/UL — SIGNIFICANT CHANGE UP (ref 3.8–10.5)
WBC # FLD AUTO: 7.9 K/UL — SIGNIFICANT CHANGE UP (ref 3.8–10.5)

## 2021-07-29 PROCEDURE — 72197 MRI PELVIS W/O & W/DYE: CPT | Mod: 26

## 2021-07-29 RX ORDER — OXYCODONE AND ACETAMINOPHEN 5; 325 MG/1; MG/1
1 TABLET ORAL EVERY 6 HOURS
Refills: 0 | Status: DISCONTINUED | OUTPATIENT
Start: 2021-07-29 | End: 2021-07-31

## 2021-07-29 RX ORDER — LANOLIN ALCOHOL/MO/W.PET/CERES
6 CREAM (GRAM) TOPICAL AT BEDTIME
Refills: 0 | Status: DISCONTINUED | OUTPATIENT
Start: 2021-07-29 | End: 2021-07-31

## 2021-07-29 RX ADMIN — PANTOPRAZOLE SODIUM 40 MILLIGRAM(S): 20 TABLET, DELAYED RELEASE ORAL at 05:41

## 2021-07-29 RX ADMIN — POLYETHYLENE GLYCOL 3350 17 GRAM(S): 17 POWDER, FOR SOLUTION ORAL at 11:52

## 2021-07-29 RX ADMIN — HEPARIN SODIUM 0 UNIT(S)/HR: 5000 INJECTION INTRAVENOUS; SUBCUTANEOUS at 04:09

## 2021-07-29 RX ADMIN — HEPARIN SODIUM 1500 UNIT(S)/HR: 5000 INJECTION INTRAVENOUS; SUBCUTANEOUS at 13:34

## 2021-07-29 RX ADMIN — HEPARIN SODIUM 0 UNIT(S)/HR: 5000 INJECTION INTRAVENOUS; SUBCUTANEOUS at 20:17

## 2021-07-29 RX ADMIN — HEPARIN SODIUM 1300 UNIT(S)/HR: 5000 INJECTION INTRAVENOUS; SUBCUTANEOUS at 05:19

## 2021-07-29 RX ADMIN — HEPARIN SODIUM 4000 UNIT(S): 5000 INJECTION INTRAVENOUS; SUBCUTANEOUS at 13:38

## 2021-07-29 RX ADMIN — SENNA PLUS 2 TABLET(S): 8.6 TABLET ORAL at 23:10

## 2021-07-29 RX ADMIN — HEPARIN SODIUM 1200 UNIT(S)/HR: 5000 INJECTION INTRAVENOUS; SUBCUTANEOUS at 21:22

## 2021-07-29 RX ADMIN — Medication 6 MILLIGRAM(S): at 23:59

## 2021-07-29 NOTE — PROVIDER CONTACT NOTE (CRITICAL VALUE NOTIFICATION) - ASSESSMENT
Patient is AxOx4. Patient denies chest pain and or SOB at this time. All VS stable
patient asymptomatic and resting in bed, all safety maintained, call bell within reach, bed alarm activated
patient asymptomatic and resting in bed, all safety maintained, call bell within reach, bed alarm activated

## 2021-07-29 NOTE — PROGRESS NOTE ADULT - SUBJECTIVE AND OBJECTIVE BOX
DATE OF SERVICE:  07/29/2021  Patient was seen and examined on  07/29/2021   .Interim events noted.Consultant notes ,Labs,Telemetry reviewed by me    PRESENTING CC:Dyspnea leg swelling    HPI and HOSPITAL COURSE: 44 yo F w/ sig PmHx p/w ~3 months of intermittent partial lip swelling, leg swelling, nonproductive cough, and SIERRA.   Pt denies CP, palp, skin changes, rashes. She does note ~60 lb weight gain but attributes that to lifestyle changes during the pandemic.     Pt has been previously seen by an allergist. Pt stated that she is planning to follow up with a cardiologist.  pt had recent flight to [place] one week ago , no cough , sob on exertion during the trip , been having shortness of breath on exertion associated with orthopnea, pnd past few days after her return from [place]  npo fever , took covid vaccine in feb , started to have cough , intermittent shortness of breath since may 2021, check for covid multiple times in last couple of weak was told neg   pt been allergy / immunology recently - unclear w/u  no miscarriages , no rash , no h/o heavy menstural bleeding, no h/o gi bleed , never received transfusion  family hx of " blood clots" +         Had CTPA-bilateral PE with acute Left DVT  Started on Heparin gtt transitioned to Eliquis  Underwent CT Abdomed for screening for occult malignancy-revealed Endometrial mass  Seen by Gyn plan for biopsy after MRI-Pelvis  Eliquis stopped and patient placed on Heparin gtt    INTERIM EVENTS:Awake alert no dyspnea led swelling improved,refers to abatment of menses      PMH -reviewed admission note, no change since admission  Heart Failure: Acute [ ] Chronic [ ] Acute on Chronic [ ] Diastolic [ ] Systolic [ ] Combined Systolic and Diastolic[ ]  JAELYN[ ]  ATN[ ]  CKD I [ ] CKDII [ ] CKD III [ ] CKD IV [ ] CKD V [ ] ESRD[ ]  HTN[ ] CVA[ ] DM[ ] COPD[ ] COVID[ ] AF[ ]  PPM[ ] ICD[ ]    MEDICATIONS  (STANDING):  heparin  Infusion.  Unit(s)/Hr (19 mL/Hr) IV Continuous <Continuous>  pantoprazole    Tablet 40 milliGRAM(s) Oral before breakfast  polyethylene glycol 3350 17 Gram(s) Oral daily  senna 2 Tablet(s) Oral at bedtime    MEDICATIONS  (PRN):  heparin   Injectable 9000 Unit(s) IV Push every 6 hours PRN For aPTT less than 40  heparin   Injectable 4000 Unit(s) IV Push every 6 hours PRN For aPTT between 40 - 57  oxycodone    5 mG/acetaminophen 325 mG 1 Tablet(s) Oral every 6 hours PRN Severe Pain (7 - 10)            REVIEW OF SYSTEMS:  Constitutional: [ ] fever, [ ]weight loss,  [ ]fatigue  Eyes: [ ] visual changes  Respiratory: [ ]shortness of breath;  [ ] cough, [ ]wheezing, [ ]chills, [ ]hemoptysis  Cardiovascular: [ ] chest pain, [ ]palpitations, [ ]dizziness,  [ ]leg swelling[ ]orthopnea[ ]PND  Gastrointestinal: [ ] abdominal pain, [ ]nausea, [ ]vomiting,  [ ]diarrhea [ ]Constipation [ ]Melena  Genitourinary: [ ] dysuria, [ ] hematuria [ ]Arnold  Neurologic: [ ] headaches [ ] tremors[ ]weakness [ ]Paralysis Right[ ] Left[ ]  Skin: [ ] itching, [ ]burning, [ ] rashes  Endocrine: [ ] heat or cold intolerance  Musculoskeletal: [ ] joint pain or swelling; [ ] muscle, back, or extremity pain  Psychiatric: [ ] depression, [ ]anxiety, [ ]mood swings, or [ ]difficulty sleeping  Hematologic: [ ] easy bruising, [ ] bleeding gums    [x] All remaining systems negative except as per above.   [ ]Unable to obtain.    Vital Signs Last 24 Hrs  T(C): 36.6 (29 Jul 2021 05:39), Max: 36.7 (28 Jul 2021 12:42)  T(F): 97.9 (29 Jul 2021 05:39), Max: 98 (28 Jul 2021 12:42)  HR: 82 (29 Jul 2021 05:39) (79 - 84)  BP: 126/83 (29 Jul 2021 05:39) (126/83 - 132/86)  RR: 16 (29 Jul 2021 05:39) (16 - 18)  SpO2: 100% (29 Jul 2021 05:39) (100% - 100%)  I&O's Summary      PHYSICAL EXAM:  General: No acute distress BMI-32  HEENT: EOMI, PERRL  Neck: Supple, [ ] JVD  Lungs: Equal air entry bilaterally; [ ] rales [ ] wheezing [ ] rhonchi  Heart: Regular rate and rhythm; [x ] murmur  2 /6 [x ] systolic [ ] diastolic [ ] radiation[ ] rubs [ ]  gallops  Abdomen: Nontender, bowel sounds present  Extremities: No clubbing, cyanosis, [v ] edema [ ]Pulses  equal and intact  Nervous system:  Alert & Oriented X3, no focal deficits  Psychiatric: Normal affect  Skin: No rashes or lesions    LABS:  07-29    135  |  101  |  12  ----------------------------<  137<H>  3.9   |  19<L>  |  0.82    Ca    8.7      29 Jul 2021 02:56  Phos  3.7     07-29  Mg     1.70     07-29      Creatinine Trend: 0.82<--, 0.80<--, 0.83<--, 0.92<--, 0.77<--, 0.85<--                        9.3    7.90  )-----------( 449      ( 29 Jul 2021 02:56 )             30.4     PT/INR - ( 28 Jul 2021 09:15 )   PT: 18.5 sec;   INR: 1.66 ratio         PTT - ( 29 Jul 2021 02:56 )  PTT:149.2 sec    RADIOLOGY:CT ABDOMEN AND PELVIS IC  Jul 25 2021   IMPRESSION:  *  Dominant pelvic mass centered on the endometrium, concerning for endometrial malignancy. Lesion appears to invade the myometrium. No evidence of parametrial spread.  *  No evidence of metastatic disease or suspicious lymphadenopathy in the abdomen and pelvis.    CT ANGIO CHEST PULM ART WAWIC    IMPRESSION:  Multiple bilateral segmental and subsegmental pulmonary emboli.Ventricular septal flattening may represent right heart strain.   Recommend further evaluation with echocardiogram if clinically warranted.        EXAM:  US DPLX LWR EXT VEINS COMPL BI    IMPRESSION:  Acute above-the-knee left popliteal vein thrombus.        ECG [my interpretation]:Sinus rhythm at 88 BPM LAE no acute ST T wave abnormalities    TELEMETRY:Reviewed monitor tracings-Sinus rhythm    ECHO:  Study Date: 7/27/2021  CONCLUSIONS:  1. Normal trileaflet aortic valve.  2. Normal left ventricular internal dimensions and wall thicknesses.  3. Normal left ventricular systolic function. No segmental wall motion abnormalities.EF 71%  4. Normal right ventricular size and function.        IMPRESSION AND PLAN:

## 2021-07-29 NOTE — PROVIDER CONTACT NOTE (CRITICAL VALUE NOTIFICATION) - BACKGROUND
patient admitted with DVT and PE
patient admitted with DVT and PE
Patient admitted for Pulmonary embolism without acute cor pulmonale. Patient has no pertinent past medical history

## 2021-07-29 NOTE — PROVIDER CONTACT NOTE (CRITICAL VALUE NOTIFICATION) - ACTION/TREATMENT ORDERED:
follow heparin nomogram
follow heparin nomogram
As per Heparin Nomogram; Heparin gtt will continue at new rate of 13ml/hr after 60 minute delay.

## 2021-07-29 NOTE — PROGRESS NOTE ADULT - SUBJECTIVE AND OBJECTIVE BOX
SUBJECTIVE / OVERNIGHT EVENTS: pt seen and examined    MEDICATIONS  (STANDING):  heparin  Infusion.  Unit(s)/Hr (19 mL/Hr) IV Continuous <Continuous>  pantoprazole    Tablet 40 milliGRAM(s) Oral before breakfast  polyethylene glycol 3350 17 Gram(s) Oral daily  senna 2 Tablet(s) Oral at bedtime    MEDICATIONS  (PRN):  heparin   Injectable 9000 Unit(s) IV Push every 6 hours PRN For aPTT less than 40  heparin   Injectable 4000 Unit(s) IV Push every 6 hours PRN For aPTT between 40 - 57  oxycodone    5 mG/acetaminophen 325 mG 1 Tablet(s) Oral every 6 hours PRN Severe Pain (7 - 10)    Orthostatic VS  Vital Signs Last 24 Hrs  T(C): 36.3 (2021 13:18), Max: 36.8 (2021 08:17)  T(F): 97.4 (2021 13:18), Max: 98.2 (2021 08:17)  HR: 84 (2021 13:18) (82 - 84)  BP: 121/75 (2021 13:18) (119/73 - 127/84)  BP(mean): --  RR: 16 (2021 13:18) (15 - 18)  SpO2: 100% (2021 13:18) (100% - 100%)    Constitutional: No fever, fatigue  Skin: No rash.  Eyes: No recent vision problems or eye pain.  ENT: No congestion, ear pain, or sore throat.  Cardiovascular: No chest pain or palpation.  Respiratory: No cough, shortness of breath, congestion, or wheezing.  Gastrointestinal: No abdominal pain, nausea, vomiting, or diarrhea.  Genitourinary: No dysuria.  Musculoskeletal: No joint swelling.  Neurologic: No headache.    PHYSICAL EXAM:  GENERAL: NAD  EYES: EOMI, PERRLA  NECK: Supple, No JVD  CHEST/LUNG: dec breath sounds at bases  HEART:  S1 , S2 +  ABDOMEN: soft , bs+  EXTREMITIES: trace edema  NEUROLOGY:alert awake oriented    LABS:      135  |  101  |  12  ----------------------------<  137<H>  3.9   |  19<L>  |  0.82    Ca    8.7      2021 02:56  Phos  3.7     07-  Mg     1.70     07-      Creatinine Trend: 0.82 <--, 0.80 <--, 0.83 <--, 0.92 <--, 0.77 <--, 0.85 <--                        9.3    7.90  )-----------( 449      ( 2021 02:56 )             30.4     Urine Studies:  Urinalysis Basic - ( 2021 10:56 )    Color: Light Brown / Appearance: Clear / S.012 / pH:   Gluc:  / Ketone: Negative  / Bili: Negative / Urobili: <2 mg/dL   Blood:  / Protein: Trace / Nitrite: Negative   Leuk Esterase: Small / RBC: 488 /HPF / WBC 4 /HPF   Sq Epi:  / Non Sq Epi: 1 /HPF / Bacteria: Negative                PT/INR - ( 2021 09:15 )   PT: 18.5 sec;   INR: 1.66 ratio         PTT - ( 2021 19:27 )  PTT:153.4 sec   4 /HPF   Sq Epi:  / Non Sq Epi: 1 /HPF / Bacteria: Negative                PT/INR - ( 2021 09:15 )   PT: 18.5 sec;   INR: 1.66 ratio         PTT - ( 2021 18:32 )  PTT:130 sec    LIVER FUNCTIONS - ( 2021 07:23 )  Alb: x     / Pro: 7.0 g/dL / ALK PHOS: x     / ALT: x     / AST: x     / GGT: x                 RADIOLOGY & ADDITIONAL TESTS:    Imaging Personally Reviewed:    Consultant(s) Notes Reviewed:      Care Discussed with Consultants/Other Providers:

## 2021-07-29 NOTE — PROGRESS NOTE ADULT - PROBLEM SELECTOR PLAN 1
Presented with Dyspnea  Acute PE on CTPA  Unprovoked DVT/PE  On Heparin gtt awaiting Endometrial Biopsy  No RV strain on TTE  Eventual transition to Eliquis

## 2021-07-29 NOTE — PROGRESS NOTE ADULT - PROBLEM SELECTOR PLAN 3
Found on CT Abdomen  No evidence for metastatic spread  Awaiting MRI Pelvis to better demonstate mass for biopsy.

## 2021-07-29 NOTE — PROVIDER CONTACT NOTE (CRITICAL VALUE NOTIFICATION) - RECOMMENDATIONS
Will continue to follow heparin nonogram. Awaiting for other recommendations if needed as per CARMELA Conde
follow heparin nomogram
follow heparin nomogram

## 2021-07-29 NOTE — PROVIDER CONTACT NOTE (CRITICAL VALUE NOTIFICATION) - SITUATION
Patient on heparin gtt. RN notified of critical value of aPTT = 153.4
patient on heparin gtt and has a critical aPTT of >200
Patient had critical value of ptt of 149.2

## 2021-07-30 ENCOUNTER — RESULT REVIEW (OUTPATIENT)
Age: 46
End: 2021-07-30

## 2021-07-30 ENCOUNTER — TRANSCRIPTION ENCOUNTER (OUTPATIENT)
Age: 46
End: 2021-07-30

## 2021-07-30 LAB
ANION GAP SERPL CALC-SCNC: 12 MMOL/L — SIGNIFICANT CHANGE UP (ref 7–14)
APTT BLD: 31.4 SEC — SIGNIFICANT CHANGE UP (ref 27–36.3)
APTT BLD: 54 SEC — HIGH (ref 27–36.3)
APTT BLD: 96.7 SEC — HIGH (ref 27–36.3)
BUN SERPL-MCNC: 13 MG/DL — SIGNIFICANT CHANGE UP (ref 7–23)
CALCIUM SERPL-MCNC: 9.2 MG/DL — SIGNIFICANT CHANGE UP (ref 8.4–10.5)
CHLORIDE SERPL-SCNC: 101 MMOL/L — SIGNIFICANT CHANGE UP (ref 98–107)
CO2 SERPL-SCNC: 22 MMOL/L — SIGNIFICANT CHANGE UP (ref 22–31)
CREAT SERPL-MCNC: 0.87 MG/DL — SIGNIFICANT CHANGE UP (ref 0.5–1.3)
GLUCOSE SERPL-MCNC: 132 MG/DL — HIGH (ref 70–99)
HCT VFR BLD CALC: 30.4 % — LOW (ref 34.5–45)
HGB BLD-MCNC: 9.3 G/DL — LOW (ref 11.5–15.5)
MAGNESIUM SERPL-MCNC: 1.7 MG/DL — SIGNIFICANT CHANGE UP (ref 1.6–2.6)
MCHC RBC-ENTMCNC: 24.3 PG — LOW (ref 27–34)
MCHC RBC-ENTMCNC: 30.6 GM/DL — LOW (ref 32–36)
MCV RBC AUTO: 79.4 FL — LOW (ref 80–100)
NRBC # BLD: 0 /100 WBCS — SIGNIFICANT CHANGE UP
NRBC # FLD: 0 K/UL — SIGNIFICANT CHANGE UP
PHOSPHATE SERPL-MCNC: 4.1 MG/DL — SIGNIFICANT CHANGE UP (ref 2.5–4.5)
PLATELET # BLD AUTO: 482 K/UL — HIGH (ref 150–400)
POTASSIUM SERPL-MCNC: 4.1 MMOL/L — SIGNIFICANT CHANGE UP (ref 3.5–5.3)
POTASSIUM SERPL-SCNC: 4.1 MMOL/L — SIGNIFICANT CHANGE UP (ref 3.5–5.3)
RBC # BLD: 3.83 M/UL — SIGNIFICANT CHANGE UP (ref 3.8–5.2)
RBC # FLD: 15 % — HIGH (ref 10.3–14.5)
SODIUM SERPL-SCNC: 135 MMOL/L — SIGNIFICANT CHANGE UP (ref 135–145)
WBC # BLD: 7.72 K/UL — SIGNIFICANT CHANGE UP (ref 3.8–10.5)
WBC # FLD AUTO: 7.72 K/UL — SIGNIFICANT CHANGE UP (ref 3.8–10.5)

## 2021-07-30 PROCEDURE — 88305 TISSUE EXAM BY PATHOLOGIST: CPT | Mod: 26

## 2021-07-30 PROCEDURE — 58100 BIOPSY OF UTERUS LINING: CPT | Mod: GC

## 2021-07-30 RX ORDER — ACETAMINOPHEN 500 MG
1000 TABLET ORAL ONCE
Refills: 0 | Status: COMPLETED | OUTPATIENT
Start: 2021-07-30 | End: 2021-07-30

## 2021-07-30 RX ORDER — APIXABAN 2.5 MG/1
10 TABLET, FILM COATED ORAL EVERY 12 HOURS
Refills: 0 | Status: DISCONTINUED | OUTPATIENT
Start: 2021-07-30 | End: 2021-07-31

## 2021-07-30 RX ADMIN — HEPARIN SODIUM 1400 UNIT(S)/HR: 5000 INJECTION INTRAVENOUS; SUBCUTANEOUS at 12:24

## 2021-07-30 RX ADMIN — APIXABAN 10 MILLIGRAM(S): 2.5 TABLET, FILM COATED ORAL at 16:02

## 2021-07-30 RX ADMIN — HEPARIN SODIUM 4000 UNIT(S): 5000 INJECTION INTRAVENOUS; SUBCUTANEOUS at 04:15

## 2021-07-30 RX ADMIN — Medication 1000 MILLIGRAM(S): at 21:05

## 2021-07-30 RX ADMIN — SENNA PLUS 2 TABLET(S): 8.6 TABLET ORAL at 21:03

## 2021-07-30 RX ADMIN — POLYETHYLENE GLYCOL 3350 17 GRAM(S): 17 POWDER, FOR SOLUTION ORAL at 12:03

## 2021-07-30 RX ADMIN — PANTOPRAZOLE SODIUM 40 MILLIGRAM(S): 20 TABLET, DELAYED RELEASE ORAL at 06:27

## 2021-07-30 RX ADMIN — Medication 400 MILLIGRAM(S): at 20:56

## 2021-07-30 RX ADMIN — HEPARIN SODIUM 1400 UNIT(S)/HR: 5000 INJECTION INTRAVENOUS; SUBCUTANEOUS at 04:12

## 2021-07-30 NOTE — DISCHARGE NOTE PROVIDER - CARE PROVIDER_API CALL
Jyoti Ashraf)  Obstetrics and Gynecology  [address] [place] [address], [address]  [place], [place] [id number]  Phone: [phone]  Fax: [phone]  Follow Up Time:     Jagdish Bradley)  Internal Medicine  [address] [facility], [address]  [place] [address]  Phone: [phone]  Fax: [phone]  Follow Up Time:     Harvey Long)  Cardiology  [address] [place] [address]  Phone: [phone]  Fax: [phone]  Follow Up Time:    Jyoti Ashraf)  Obstetrics and Gynecology  [address] [facility], [address]  [place], [place] [id number]  Phone: [phone]  Fax: [phone]  Follow Up Time:     Jagdish Bradley)  Internal Medicine  [address] [facility], [address]  [place] [address]  Phone: [phone]  Fax: [phone]  Follow Up Time:     Harvey Long)  Cardiology  [address] [place] [address]  Phone: [phone]  Fax: [phone]  Follow Up Time:     Barrett Gates)  Internal Medicine  [address] [place] [address]  Phone: [phone]  Fax: [phone]  Follow Up Time:

## 2021-07-30 NOTE — PROCEDURE NOTE - ADDITIONAL PROCEDURE DETAILS
Speculum inserted  cervix difficult to visualize 2/2 vaginal walls  able to insert pipelle into lower uterine segment to obtain sample  sample placed in formalin and walked down to pathology  minimal bleeding. pt tolerated well      GYN team to follow-up results and call patient.    YURI Norris, PGY-3  performed w/ Dr. Ashraf

## 2021-07-30 NOTE — DISCHARGE NOTE PROVIDER - NSDCCPCAREPLAN_GEN_ALL_CORE_FT
PRINCIPAL DISCHARGE DIAGNOSIS  Diagnosis: Pulmonary embolism  Assessment and Plan of Treatment: You were admitted for shortness of breath. Lower extremity duplex showed a blood clot in your leg. CT angio scan of the chest showed multiple blood clots in your lungs. You were started on a blood thinner called a heparin drip and then transitioned to eliquis. Echocardiogram was unremarkbale. Please continue taking eliquis as directed. Please follow up with your primary care physician, hematologist, and cardiologist after discharge for continued monitoring and management.      SECONDARY DISCHARGE DIAGNOSES  Diagnosis: Endometrial mass  Assessment and Plan of Treatment: On CT scan of the abdomen and pelvis, there was a pelvic mass in your endometrium. Pelvic MRI showed no malignancy, but adeomyosis, which is abnormal growth of the endometrium. Endometrial biopsy was done by gynecology team and will be followed up outpatient. Please follow up with your primary care physician and gynecologist after discharge for continued monitoring and management.

## 2021-07-30 NOTE — DISCHARGE NOTE PROVIDER - PROVIDER TOKENS
PROVIDER:[TOKEN:[[id number]]],PROVIDER:[TOKEN:[[id number]]],PROVIDER:[TOKEN:[[id number]]] PROVIDER:[TOKEN:[[id number]]],PROVIDER:[TOKEN:[[id number]]],PROVIDER:[TOKEN:[[id number]]],PROVIDER:[TOKEN:[[id number]]]

## 2021-07-30 NOTE — DISCHARGE NOTE PROVIDER - HOSPITAL COURSE
46 yo F w/ sig PmHx p/w ~3 months of intermittent partial lip swelling, leg swelling, nonproductive cough, and SIERRA. Pt denies CP, palp, skin changes, rashes. She does note ~60 lb weight gain but attributes that to lifestyle changes during the pandemic. Pt has been previously seen by an allergist. Pt stated that she is planning to follow up with a cardiologist. Pt had recent flight to [place] one week ago , no cough , sob on exertion during the trip , been having shortness of breath on exertion associated with orthopnea, pnd past few days [place] , took covid vaccine in feb , started to have cough , intermittent shortness of breath since may 2021, check for covid multiple times in last couple of weak was told neg. pt been allergy / immunology recently - unclear w/u. no miscarriages , no rash , no h/o heavy menstural bleeding, no h/o gi bleed , never received transfusion. family hx of " blood clots".    Patient was admitted for SOB. Lower extremity duplex showed acute above the knee left popliteal vein thrombus. CTA of the chest showed multiple bilateral segmental and subsegmental pulmonary emboli. Patient was started on heparin gtt and then transitioned to eliquis. Patient remained off oxygen during admission. TTE was unremarkbale. On CT A/P, there was a dominant pelvic mass centered on the endometrium concerning for endometrial malignancy. Pelvic MRI showed enlarged globular uterus with findings typical for diffuse adenomyosis, with no evidence of uterine malignancy. Endometrial biopsy was done by GYN and will be followed up outpatient. Patient will follow up outpatient for hypercoagulable work up.     On ___ this case was reviewed with  ____, the patient is medically stable and optimized for discharge. All medications were reviewed and prescriptions were sent to mutually agreed upon pharmacy.                     44 yo F w/ sig PmHx p/w ~3 months of intermittent partial lip swelling, leg swelling, nonproductive cough, and SIERRA. Pt denies CP, palp, skin changes, rashes. She does note ~60 lb weight gain but attributes that to lifestyle changes during the pandemic. Pt has been previously seen by an allergist. Pt stated that she is planning to follow up with a cardiologist. Pt had recent flight to [place] one week ago , no cough , sob on exertion during the trip , been having shortness of breath on exertion associated with orthopnea, pnd past few days [place] , took covid vaccine in feb , started to have cough , intermittent shortness of breath since may 2021, check for covid multiple times in last couple of weak was told neg. pt been allergy / immunology recently - unclear w/u. no miscarriages , no rash , no h/o heavy menstural bleeding, no h/o gi bleed , never received transfusion. family hx of " blood clots".    Patient was admitted for SOB. Lower extremity duplex showed acute above the knee left popliteal vein thrombus. CTA of the chest showed multiple bilateral segmental and subsegmental pulmonary emboli. Patient was started on heparin gtt and then transitioned to eliquis. Patient remained off oxygen during admission. TTE was unremarkbale. On CT A/P, there was a dominant pelvic mass centered on the endometrium concerning for endometrial malignancy. Pelvic MRI showed enlarged globular uterus with findings typical for diffuse adenomyosis, with no evidence of uterine malignancy. Endometrial biopsy was done by GYN and will be followed up outpatient. Patient will follow up outpatient for hypercoagulable work up.     On 7/31/2021 this case was reviewed with Dr. Gates and Dr. Long, the patient is medically stable and optimized for discharge. All medications were reviewed and prescriptions were sent to mutually agreed upon pharmacy.

## 2021-07-30 NOTE — DISCHARGE NOTE PROVIDER - NSDCMRMEDTOKEN_GEN_ALL_CORE_FT
albuterol 2.5 mg/3 mL (0.083%) inhalation solution: 3 milliliter(s) inhaled every 6 hours, As Needed SOB/Wheezing  apixaban 5 mg oral tablet: 1 tab(s) orally 2 times a day    albuterol 2.5 mg/3 mL (0.083%) inhalation solution: 3 milliliter(s) inhaled every 6 hours, As Needed SOB/Wheezing  apixaban 5 mg oral tablet: 2 tab(s) orally 2 times a day until 8/2 AM dose, then decrease to 1 tab orally 2 times a day starting 8/2 PM dose

## 2021-07-30 NOTE — PROGRESS NOTE ADULT - SUBJECTIVE AND OBJECTIVE BOX
SUBJECTIVE / OVERNIGHT EVENTS: pt seen and examined    MEDICATIONS  (STANDING):  apixaban 10 milliGRAM(s) Oral every 12 hours  pantoprazole    Tablet 40 milliGRAM(s) Oral before breakfast  polyethylene glycol 3350 17 Gram(s) Oral daily  senna 2 Tablet(s) Oral at bedtime    MEDICATIONS  (PRN):  melatonin 6 milliGRAM(s) Oral at bedtime PRN Insomnia  oxycodone    5 mG/acetaminophen 325 mG 1 Tablet(s) Oral every 6 hours PRN Severe Pain (7 - 10)    Vital Signs Last 24 Hrs  T(C): 36.4 (2021 21:00), Max: 36.7 (2021 23:00)  T(F): 97.6 (:00), Max: 98.1 (2021 23:00)  HR: 87 (:00) (71 - 89)  BP: 118/75 (:00) (102/61 - 127/86)  BP(mean): --  RR: 18 (:00) (16 - 18)  SpO2: 98% (:00) (97% - 98%)    Constitutional: No fever, fatigue  Skin: No rash.  Eyes: No recent vision problems or eye pain.  ENT: No congestion, ear pain, or sore throat.  Cardiovascular: No chest pain or palpation.  Respiratory: No cough, shortness of breath, congestion, or wheezing.  Gastrointestinal: No abdominal pain, nausea, vomiting, or diarrhea.  Genitourinary: No dysuria.  Musculoskeletal: No joint swelling.  Neurologic: No headache.    PHYSICAL EXAM:  GENERAL: NAD  EYES: EOMI, PERRLA  NECK: Supple, No JVD  CHEST/LUNG: dec breath sounds at bases  HEART:  S1 , S2 +  ABDOMEN: soft , bs+  EXTREMITIES: trace edema  NEUROLOGY:alert awake oriented    LABS:      135  |  101  |  13  ----------------------------<  132<H>  4.1   |  22  |  0.87    Ca    9.2      2021 03:15  Phos  4.1     07-30  Mg     1.70     -      Creatinine Trend: 0.87 <--, 0.82 <--, 0.80 <--, 0.83 <--, 0.92 <--, 0.77 <--, 0.85 <--                        9.3    7.72  )-----------( 482      ( 2021 03:15 )             30.4     Urine Studies:  Urinalysis Basic - ( 2021 10:56 )    Color: Light Brown / Appearance: Clear / S.012 / pH:   Gluc:  / Ketone: Negative  / Bili: Negative / Urobili: <2 mg/dL   Blood:  / Protein: Trace / Nitrite: Negative   Leuk Esterase: Small / RBC: 488 /HPF / WBC 4 /HPF   Sq Epi:  / Non Sq Epi: 1 /HPF / Bacteria: Negative                PTT - ( 2021 18:56 )  PTT:31.4 sec  PTT - ( 2021 19:27 )  PTT:153.4 sec   4 /HPF   Sq Epi:  / Non Sq Epi: 1 /HPF / Bacteria: Negative                PT/INR - ( 2021 09:15 )   PT: 18.5 sec;   INR: 1.66 ratio         PTT - ( 2021 18:32 )  PTT:130 sec    LIVER FUNCTIONS - ( 2021 07:23 )  Alb: x     / Pro: 7.0 g/dL / ALK PHOS: x     / ALT: x     / AST: x     / GGT: x                 RADIOLOGY & ADDITIONAL TESTS:    Imaging Personally Reviewed:    Consultant(s) Notes Reviewed:      Care Discussed with Consultants/Other Providers:

## 2021-07-30 NOTE — DISCHARGE NOTE PROVIDER - CARE PROVIDERS DIRECT ADDRESSES
,[email or web address],DirectAddress_Unknown,DirectAddress_Unknown ,[email or web address],DirectAddress_Unknown,DirectAddress_Unknown,DirectAddress_Unknown

## 2021-07-30 NOTE — PROGRESS NOTE ADULT - SUBJECTIVE AND OBJECTIVE BOX
PATIENT SEEN AND EXAMINED ON :-07/30/2021  DATE OF SERVICE:   07/30/2021   Interim events noted,Labs ,Radiological studies and Cardiology tests reviewed .    PRESENTING CC:Dyspnea leg swelling    HPI and HOSPITAL COURSE: 44 yo F w/ sig PmHx p/w ~3 months of intermittent partial lip swelling, leg swelling, nonproductive cough, and SIERRA.   Pt denies CP, palp, skin changes, rashes. She does note ~60 lb weight gain but attributes that to lifestyle changes during the pandemic.     Pt has been previously seen by an allergist. Pt stated that she is planning to follow up with a cardiologist.  pt had recent flight to [place] one week ago , no cough , sob on exertion during the trip , been having shortness of breath on exertion associated with orthopnea, pnd past few days after her return from [place]  npo fever , took covid vaccine in feb , started to have cough , intermittent shortness of breath since may 2021, check for covid multiple times in last couple of weak was told neg   pt been allergy / immunology recently - unclear w/u  no miscarriages , no rash , no h/o heavy menstural bleeding, no h/o gi bleed , never received transfusion  family hx of " blood clots" +         Had CTPA-bilateral PE with acute Left DVT  Started on Heparin gtt transitioned to Eliquis  Underwent CT Abdomed for screening for occult malignancy-revealed Endometrial mass  Seen by Gyn plan for biopsy after MRI-Pelvis  Eliquis stopped and patient placed on Heparin gtt    INTERIM EVENTS:Awake alert menses ceased no dyspnea had MRI-Pelvis no malignancy awaiting Biopsy      MEDICATIONS  (STANDING):  heparin  Infusion.  Unit(s)/Hr (19 mL/Hr) IV Continuous <Continuous>  pantoprazole    Tablet 40 milliGRAM(s) Oral before breakfast  polyethylene glycol 3350 17 Gram(s) Oral daily  senna 2 Tablet(s) Oral at bedtime    MEDICATIONS  (PRN):  heparin   Injectable 9000 Unit(s) IV Push every 6 hours PRN For aPTT less than 40  heparin   Injectable 4000 Unit(s) IV Push every 6 hours PRN For aPTT between 40 - 57  oxycodone    5 mG/acetaminophen 325 mG 1 Tablet(s) Oral every 6 hours PRN Severe Pain (7 - 10)          Vital Signs Last 24 Hrs  T(C): 36.4 (30 Jul 2021 21:00), Max: 36.7 (29 Jul 2021 23:00)  T(F): 97.6 (30 Jul 2021 21:00), Max: 98.1 (29 Jul 2021 23:00)  HR: 87 (30 Jul 2021 21:00) (71 - 89)  BP: 118/75 (30 Jul 2021 21:00) (102/61 - 127/86)  RR: 18 (30 Jul 2021 21:00) (16 - 18)  SpO2: 98% (30 Jul 2021 21:00) (97% - 98%)        PHYSICAL EXAM:  General: No acute distress BMI-32  HEENT: EOMI, PERRL  Neck: Supple, [ ] JVD  Lungs: Equal air entry bilaterally; [ ] rales [ ] wheezing [ ] rhonchi  Heart: Regular rate and rhythm; [x ] murmur  2 /6 [x ] systolic [ ] diastolic [ ] radiation[ ] rubs [ ]  gallops  Abdomen: Nontender, bowel sounds present  Extremities: No clubbing, cyanosis, [v ] edema [ ]Pulses  equal and intact  Nervous system:  Alert & Oriented X3, no focal deficits  Psychiatric: Normal affect  Skin: No rashes or lesions      LABS:  07-30    135  |  101  |  13  ----------------------------<  132<H>  4.1   |  22  |  0.87    Ca    9.2      30 Jul 2021 03:15  Phos  4.1     07-30  Mg     1.70     07-30      Creatinine Trend: 0.87 <--, 0.82 <--, 0.80 <--, 0.83 <--, 0.92 <--, 0.77 <--, 0.85 <--                        9.3    7.72  )-----------( 482      ( 30 Jul 2021 03:15 )             30.4       RADIOLOGY:   MR PELVIS WAW   Jul 29 2021     IMPRESSION:  Enlarged globular uterus with findings typical for diffuse adenomyosis. No evidence of uterine malignancy.      UTERUS: Anteverted measuring 13.4 x 11.1 x 10.5 cm.  ENDOMETRIUM: 5 mm, within normal limits.  JUNCTIONAL ZONE: Marked thickening of the endometrial myometrial zone which measures up to 4.2 cm in thickness both anteriorly and posteriorly to the endometrial complex with numerous T2 hyperintense foci.   Findings are typical for adenomyosis.  RIGHT OVARY: 2.3 x 1.8 x 5.2 cm, within normal limits.  LEFT OVARY: 2.6 x 1.5 x 3.6 cm, within normal limits.  ADNEXA: Within normal limits.

## 2021-07-31 ENCOUNTER — TRANSCRIPTION ENCOUNTER (OUTPATIENT)
Age: 46
End: 2021-07-31

## 2021-07-31 VITALS
OXYGEN SATURATION: 99 % | RESPIRATION RATE: 17 BRPM | TEMPERATURE: 98 F | SYSTOLIC BLOOD PRESSURE: 119 MMHG | HEART RATE: 77 BPM | DIASTOLIC BLOOD PRESSURE: 77 MMHG

## 2021-07-31 LAB
ANION GAP SERPL CALC-SCNC: 15 MMOL/L — HIGH (ref 7–14)
BUN SERPL-MCNC: 12 MG/DL — SIGNIFICANT CHANGE UP (ref 7–23)
CALCIUM SERPL-MCNC: 9.5 MG/DL — SIGNIFICANT CHANGE UP (ref 8.4–10.5)
CHLORIDE SERPL-SCNC: 103 MMOL/L — SIGNIFICANT CHANGE UP (ref 98–107)
CO2 SERPL-SCNC: 21 MMOL/L — LOW (ref 22–31)
CREAT SERPL-MCNC: 0.81 MG/DL — SIGNIFICANT CHANGE UP (ref 0.5–1.3)
GLUCOSE SERPL-MCNC: 135 MG/DL — HIGH (ref 70–99)
HCT VFR BLD CALC: 32.6 % — LOW (ref 34.5–45)
HGB BLD-MCNC: 9.8 G/DL — LOW (ref 11.5–15.5)
MAGNESIUM SERPL-MCNC: 1.7 MG/DL — SIGNIFICANT CHANGE UP (ref 1.6–2.6)
MCHC RBC-ENTMCNC: 24.1 PG — LOW (ref 27–34)
MCHC RBC-ENTMCNC: 30.1 GM/DL — LOW (ref 32–36)
MCV RBC AUTO: 80.1 FL — SIGNIFICANT CHANGE UP (ref 80–100)
NRBC # BLD: 0 /100 WBCS — SIGNIFICANT CHANGE UP
NRBC # FLD: 0 K/UL — SIGNIFICANT CHANGE UP
PHOSPHATE SERPL-MCNC: 4.3 MG/DL — SIGNIFICANT CHANGE UP (ref 2.5–4.5)
PLATELET # BLD AUTO: 475 K/UL — HIGH (ref 150–400)
POTASSIUM SERPL-MCNC: 4.4 MMOL/L — SIGNIFICANT CHANGE UP (ref 3.5–5.3)
POTASSIUM SERPL-SCNC: 4.4 MMOL/L — SIGNIFICANT CHANGE UP (ref 3.5–5.3)
RBC # BLD: 4.07 M/UL — SIGNIFICANT CHANGE UP (ref 3.8–5.2)
RBC # FLD: 14.9 % — HIGH (ref 10.3–14.5)
SODIUM SERPL-SCNC: 139 MMOL/L — SIGNIFICANT CHANGE UP (ref 135–145)
WBC # BLD: 6.24 K/UL — SIGNIFICANT CHANGE UP (ref 3.8–10.5)
WBC # FLD AUTO: 6.24 K/UL — SIGNIFICANT CHANGE UP (ref 3.8–10.5)

## 2021-07-31 RX ORDER — APIXABAN 2.5 MG/1
2 TABLET, FILM COATED ORAL
Qty: 120 | Refills: 0
Start: 2021-07-31 | End: 2021-08-29

## 2021-07-31 RX ADMIN — Medication 1200 MILLIGRAM(S): at 11:59

## 2021-07-31 RX ADMIN — POLYETHYLENE GLYCOL 3350 17 GRAM(S): 17 POWDER, FOR SOLUTION ORAL at 11:59

## 2021-07-31 RX ADMIN — PANTOPRAZOLE SODIUM 40 MILLIGRAM(S): 20 TABLET, DELAYED RELEASE ORAL at 06:21

## 2021-07-31 RX ADMIN — APIXABAN 10 MILLIGRAM(S): 2.5 TABLET, FILM COATED ORAL at 04:10

## 2021-07-31 RX ADMIN — Medication 1200 MILLIGRAM(S): at 00:27

## 2021-07-31 NOTE — DISCHARGE NOTE NURSING/CASE MANAGEMENT/SOCIAL WORK - PATIENT PORTAL LINK FT
You can access the FollowMyHealth Patient Portal offered by [facility] by registering at the following website: [email or web address]. By joining [facility]’s FollowMyHealth portal, you will also be able to view your health information using other applications (apps) compatible with our system.

## 2021-07-31 NOTE — PROGRESS NOTE ADULT - TIME BILLING
- Review of records, telemetry, vital signs and daily labs.   - General and cardiovascular physical examination.  - Generation of cardiovascular treatment plan.  - Coordination of care.      Patient was seen and examined by me on 07/30/2021,interim events noted,labs and radiology studies reviewed.  Harvey Long MD,FACC.  [address] [place], [place] [id number].  [phone]
- Review of records, telemetry, vital signs and daily labs.   - General and cardiovascular physical examination.  - Generation of cardiovascular treatment plan.  - Coordination of care.      Patient was seen and examined by me on 07/28/2021,interim events noted,labs and radiology studies reviewed.  Harvey Long MD,FACC.  [address] [place], [place] [id number].  [phone]
- Review of records, telemetry, vital signs and daily labs.   - General and cardiovascular physical examination.  - Generation of cardiovascular treatment plan.  - Coordination of care.  -[facility] Code-[id number]-Face to Face discussion with Patient -32 minutes-Reviewed all findings during hospital stay-emphasised need to continue Eliquis,follow up with Heme for outpatient Hypercoagopathy work-up especially with Family history noed.  Follow up Endometrial biopsy results      Patient was seen and examined by me on 07/31/2021,interim events noted,labs and radiology studies reviewed.  Harvey Long MD,FACC.  [address] [place], [place] [id number].  [phone]
- Review of records, telemetry, vital signs and daily labs.   - General and cardiovascular physical examination.  - Generation of cardiovascular treatment plan.  - Coordination of care.  -[facility] Code-[id number]-Face to face discussion >35 minutes with patient -reviewed findings,plan of care discussed on phone with patients sister-plan for Endometrial biopsy after MRI-Pelvis likely tomorrow.      Patient was seen and examined by me on 07/29/2021,interim events noted,labs and radiology studies reviewed.  Harvey Long MD,FACC.  [address] [place], [place] [id number].  [phone]
- Review of records, telemetry, vital signs and daily labs.   - General and cardiovascular physical examination.  - Generation of cardiovascular treatment plan.  - Coordination of care.      Patient was seen and examined by me on 07/26/2021,interim events noted,labs and radiology studies reviewed.  Harvey Long MD,FACC.  [address] [place], [place] [id number].  [phone]
- Review of records, telemetry, vital signs and daily labs.   - General and cardiovascular physical examination.  - Generation of cardiovascular treatment plan.  - Coordination of care.  -[facility] Code-[id number]-Face to face discussion ~~30minutes reviewed CT Abdomen findings-patient would like biopsy while in house as concerned about procedure on Anticoagulation,conferenced with ptients sister and updated on plan of care.      Patient was seen and examined by me on 07/27/2021,interim events noted,labs and radiology studies reviewed.  Harvey Long MD,FACC.  [address] [place] [address].  [facility].  [phone]
- Review of records, telemetry, vital signs and daily labs.   - General and cardiovascular physical examination.  - Generation of cardiovascular treatment plan.  - Coordination of care.  -[facility] Code-[id number]-Face to face discussion ~~38 minutes-discussed plan of care conferenced with Patients sister and reviewed Radiology results adviced need for CT Abdomed, plan to transition to Eliis and awaiting TTE.      Patient was seen and examined by me on 07/25/2021,interim events noted,labs and radiology studies reviewed.  Harvey Long MD,FACC.  [address] [place], [place] [id number].  [phone]

## 2021-07-31 NOTE — PROGRESS NOTE ADULT - SUBJECTIVE AND OBJECTIVE BOX
DATE OF SERVICE:  07/31/2021  Patient was seen and examined on 07/31/2021    .Interim events noted.Consultant notes ,Labs,Telemetry reviewed by me    PRESENTING CC:Dyspnea    HPI and HOSPITAL COURSE: 44 yo F w/ sig PmHx p/w ~3 months of intermittent partial lip swelling, leg swelling, nonproductive cough, and SIERRA.     INTERIM EVENTS:Awake alert had biopsy restarted on Eliquis no abdominal pain or bleeding noted no dyspnea      PMH -reviewed admission note, no change since admission  Heart Failure: Acute [ ] Chronic [ ] Acute on Chronic [ ] Diastolic [ ] Systolic [ ] Combined Systolic and Diastolic[ ]  JAELYN[ ]  ATN[ ]  CKD I [ ] CKDII [ ] CKD III [ ] CKD IV [ ] CKD V [ ] ESRD[ ]  HTN[ ] CVA[ ] DM[ ] COPD[ ] COVID[ ] AF[ ]  PPM[ ] ICD[ ]    MEDICATIONS  (STANDING):  apixaban 10 milliGRAM(s) Oral every 12 hours  guaiFENesin ER 1200 milliGRAM(s) Oral every 12 hours  pantoprazole    Tablet 40 milliGRAM(s) Oral before breakfast  polyethylene glycol 3350 17 Gram(s) Oral daily  senna 2 Tablet(s) Oral at bedtime    MEDICATIONS  (PRN):  melatonin 6 milliGRAM(s) Oral at bedtime PRN Insomnia  oxycodone    5 mG/acetaminophen 325 mG 1 Tablet(s) Oral every 6 hours PRN Severe Pain (7 - 10)            REVIEW OF SYSTEMS:  Constitutional: [ ] fever, [ ]weight loss,  [x ]fatigue  Eyes: [ ] visual changes  Respiratory: [ ]shortness of breath;  [ ] cough, [ ]wheezing, [ ]chills, [ ]hemoptysis  Cardiovascular: [ ] chest pain, [ ]palpitations, [ ]dizziness,  [ ]leg swelling[ ]orthopnea[ ]PND  Gastrointestinal: [ ] abdominal pain, [ ]nausea, [ ]vomiting,  [ ]diarrhea [ ]Constipation [ ]Melena  Genitourinary: [ ] dysuria, [ ] hematuria [ ]Arnold  Neurologic: [ ] headaches [ ] tremors[ ]weakness [ ]Paralysis Right[ ] Left[ ]  Skin: [ ] itching, [ ]burning, [ ] rashes  Endocrine: [ ] heat or cold intolerance  Musculoskeletal: [ ] joint pain or swelling; [ ] muscle, back, or extremity pain  Psychiatric: [ ] depression, [ ]anxiety, [ ]mood swings, or [ ]difficulty sleeping  Hematologic: [ ] easy bruising, [ ] bleeding gums    [x] All remaining systems negative except as per above.   [ ]Unable to obtain.    Vital Signs Last 24 Hrs  T(C): 36.5 (31 Jul 2021 06:20), Max: 36.6 (30 Jul 2021 12:00)  T(F): 97.7 (31 Jul 2021 06:20), Max: 97.8 (30 Jul 2021 12:00)  HR: 86 (31 Jul 2021 06:20) (71 - 87)  BP: 104/72 (31 Jul 2021 06:20) (104/72 - 118/75)  RR: 18 (31 Jul 2021 06:20) (18 - 18)  SpO2: 98% (31 Jul 2021 06:20) (97% - 98%)  I&O's Summary    30 Jul 2021 07:01  -  31 Jul 2021 07:00  --------------------------------------------------------  IN: 670 mL / OUT: 0 mL / NET: 670 mL        PHYSICAL EXAM:  General: No acute distress BMI-31  HEENT: EOMI, PERRL  Neck: Supple, [ ] JVD  Lungs: Equal air entry bilaterally; [ ] rales [ ] wheezing [ ] rhonchi  Heart: Regular rate and rhythm; [x ] murmur  2 /6 [x ] systolic [ ] diastolic [ ] radiation[ ] rubs [ ]  gallops  Abdomen: Nontender, bowel sounds present  Extremities: No clubbing, cyanosis, [ ] edema [ ]Pulses  equal and intact  Nervous system:  Alert & Oriented X3, no focal deficits  Psychiatric: Normal affect  Skin: No rashes or lesions    LABS:  07-31    139  |  103  |  12  ----------------------------<  135<H>  4.4   |  21<L>  |  0.81    Ca    9.5      31 Jul 2021 06:45  Phos  4.3     07-31  Mg     1.70     07-31      Creatinine Trend: 0.81<--, 0.87<--, 0.82<--, 0.80<--, 0.83<--, 0.92<--                        9.8    6.24  )-----------( 475      ( 31 Jul 2021 06:45 )             32.6     PTT - ( 30 Jul 2021 18:56 )  PTT:31.4 sec          RADIOLOGY:  EXAM:CT ABDOMEN AND PELVIS IC  Jul 25 2021   IMPRESSION:  *  Dominant pelvic mass centered on the endometrium, concerning for endometrial malignancy. Lesion appears to invade the myometrium. No evidence of parametrial spread.  *  No evidence of metastatic disease or suspicious lymphadenopathy in the abdomen and pelvis.    EXAM:CT ANGIO CHEST PULM ART WAWIC    IMPRESSION:  Multiple bilateral segmental and subsegmental pulmonary emboli.Ventricular septal flattening may represent right heart strain.   Recommend further evaluation with echocardiogram if clinically warranted.    EXAM:  US DPLX LWR EXT VEINS COMPL BI    IMPRESSION:  Acute above-the-knee left popliteal vein thrombus.    EXAM:MR PELVIS WAW IC  Jul 29 2021   IMPRESSION:  Enlarged globular uterus with findings typical for diffuse adenomyosis. No evidence of uterine malignancy.      UTERUS: Anteverted measuring 13.4 x 11.1 x 10.5 cm.  ENDOMETRIUM: 5 mm, within normal limits.  JUNCTIONAL ZONE: Marked thickening of the endometrial myometrial zone which measures up to 4.2 cm in thickness both anteriorly and posteriorly to the endometrial complex with numerous T2 hyperintense foci.   Findings are typical for adenomyosis.  RIGHT OVARY: 2.3 x 1.8 x 5.2 cm, within normal limits.  LEFT OVARY: 2.6 x 1.5 x 3.6 cm, within normal limits.  ADNEXA: Within normal limits.      ECHO:  Study Date: 7/27/2021  CONCLUSIONS:  1. Normal trileaflet aortic valve.  2. Normal left ventricular internal dimensions and wall thicknesses.  3. Normal left ventricular systolic function. No segmental wall motion abnormalities.EF 71%  4. Normal right ventricular size and function.      ECHO:  Study Date: 7/27/2021  CONCLUSIONS:  1. Normal trileaflet aortic valve.  2. Normal left ventricular internal dimensions and wall thicknesses.  3. Normal left ventricular systolic function. No segmental wall motion abnormalities.EF 71%  4. Normal right ventricular size and function.

## 2021-07-31 NOTE — PROGRESS NOTE ADULT - PROVIDER SPECIALTY LIST ADULT
Cardiology
Internal Medicine
Cardiology
Heme/Onc
Internal Medicine
Cardiology
Heme/Onc
Cardiology
Cardiology
Heme/Onc

## 2021-07-31 NOTE — PROGRESS NOTE ADULT - ASSESSMENT
46 yo F w/ sig PmHx p/w ~3 months of intermittent partial lip swelling, leg swelling, nonproductive cough, and SIERRA found to have DVT PE     DVT/PE  - pt found to have Acute above-the-knee left popliteal vein thrombus.  - Multiple bilateral segmental and subsegmental pulmonary emboli. Ventricular septal flattening may represent right heart strain  - now on apixaban dced , will transition to heparin as bx maybe needed   - pt with trip to [place] last week on plane for about 2 hours, likely did not contribute  -ct a/p with *  Dominant pelvic mass centered on the endometrium, concerning for endometrial malignancy. Lesion appears to invade the myometrium. No evidence of parametrial spread.No evidence of metastatic disease or suspicious lymphadenopathy in the abdomen and pelvis.  -defer hypercoag w/u given ct findings    Endometrial Mass  -Recommend pelvic MRI to further evaluation mass  -as per obgyn,   -Final recommendations pending MRI  -pt works at [employer] may seek care there for oncology care   - pt would like bx done in house   - follow up with GYN     anemia   - mild microcytic anemia   -ferritin in mildly low range, can receive out pt IV iron post completion of menstrual period  -start oral iron ferrous sulfate 325 mg once daily  f/u hgb electrophoresis  - keep hg>7       Jagdish Bradley MD  Hematology Oncology   O: [phone]  C: [phone]     
pt with insig past med hx p/w cob on exertion, pedal edema c/w   < from: US Duplex Venous Lower Ext Complete, Bilateral (07.24.21 @ 09:41) >  Acute above-the-knee left popliteal vein thrombus.    < end of copied text >    < from: CT Angio Chest PE Protocol w/ IV Cont (07.24.21 @ 12:00) >  Multiple bilateral segmental and subsegmental pulmonary emboli.Ventricular septal flattening may represent right heart strain. Recommend further evaluation with echocardiogram if clinically warranted.    < end of copied text >    - check factor  V leiden, prothrombin gene mutation test , cont ac    - < from: Transthoracic Echocardiogram (07.27.21 @ 14:01) >  1. Normal trileaflet aortic valve.  2. Normal left ventricular internal dimensions and wall  thicknesses.  3. Normal left ventricular systolic function. No segmental  wall motion abnormalities.  4. Normal right ventricular size and function.    < end of copied text >    - < from: CT Abdomen and Pelvis w/ IV Cont (07.25.21 @ 20:42) >  *  Dominant pelvic mass centered on the endometrium, concerning for endometrial malignancy. Lesion appears to invade the myometrium. No evidence of parametrial spread.  *  No evidence of metastatic disease or suspicious lymphadenopathy in the abdomen and pelvis.    - gyn evaluated pt -  biopsy done today    -Microcytic anemia- no signs of active bleeding at present,   had extensive lengthy discussion with pt about pts current clinical status , management plan , all questions answered  [facility] code [id number]
pt with insig past med hx p/w cob on exertion, pedal edema c/w   < from: US Duplex Venous Lower Ext Complete, Bilateral (07.24.21 @ 09:41) >  Acute above-the-knee left popliteal vein thrombus.    < end of copied text >    < from: CT Angio Chest PE Protocol w/ IV Cont (07.24.21 @ 12:00) >  Multiple bilateral segmental and subsegmental pulmonary emboli.Ventricular septal flattening may represent right heart strain. Recommend further evaluation with echocardiogram if clinically warranted.    < end of copied text >    - check factor  V leiden, prothrombin gene mutation test , start heparin drip   -tele, echo to check for rv strain   - < from: CT Abdomen and Pelvis w/ IV Cont (07.25.21 @ 20:42) >  *  Dominant pelvic mass centered on the endometrium, concerning for endometrial malignancy. Lesion appears to invade the myometrium. No evidence of parametrial spread.  *  No evidence of metastatic disease or suspicious lymphadenopathy in the abdomen and pelvis.    - gyn eval called  -Microcytic anemia- no signs of active bleeding at present, iron studies, heme eval  had extensive lengthy discussion with pt about pts current clinical status , management plan , all questions answered  [facility] code [id number]
pt with insig past med hx p/w cob on exertion, pedal edema c/w   < from: US Duplex Venous Lower Ext Complete, Bilateral (07.24.21 @ 09:41) >  Acute above-the-knee left popliteal vein thrombus.    < end of copied text >    < from: CT Angio Chest PE Protocol w/ IV Cont (07.24.21 @ 12:00) >  Multiple bilateral segmental and subsegmental pulmonary emboli.Ventricular septal flattening may represent right heart strain. Recommend further evaluation with echocardiogram if clinically warranted.    < end of copied text >    - check factor  V leiden, prothrombin gene mutation test , start heparin drip   -tele, echo to check for rv strain   - < from: CT Abdomen and Pelvis w/ IV Cont (07.25.21 @ 20:42) >  *  Dominant pelvic mass centered on the endometrium, concerning for endometrial malignancy. Lesion appears to invade the myometrium. No evidence of parametrial spread.  *  No evidence of metastatic disease or suspicious lymphadenopathy in the abdomen and pelvis.    - gyn evaluated pt   -Microcytic anemia- no signs of active bleeding at present, iron studies, heme eval  had extensive lengthy discussion with pt about pts current clinical status , management plan , all questions answered  [facility] health code [id number]
pt with insig past med hx p/w cob on exertion, pedal edema c/w   < from: US Duplex Venous Lower Ext Complete, Bilateral (07.24.21 @ 09:41) >  Acute above-the-knee left popliteal vein thrombus.    < end of copied text >    < from: CT Angio Chest PE Protocol w/ IV Cont (07.24.21 @ 12:00) >  Multiple bilateral segmental and subsegmental pulmonary emboli.Ventricular septal flattening may represent right heart strain. Recommend further evaluation with echocardiogram if clinically warranted.    < end of copied text >    - check factor  V leiden, prothrombin gene mutation test , start heparin drip   -tele, echo to check for rv strain   -CTA abd to check for extension of clot burden   - lupus profile  -Microcytic anemia- no signs of active bleeding at present, iron studies, heme eval  had extensive lengthy discussion with pt about pts current clinical status , management plan , all questions answered  [facility] health code [id number]
pt with insig past med hx p/w cob on exertion, pedal edema c/w   < from: US Duplex Venous Lower Ext Complete, Bilateral (07.24.21 @ 09:41) >  Acute above-the-knee left popliteal vein thrombus.    < end of copied text >    < from: CT Angio Chest PE Protocol w/ IV Cont (07.24.21 @ 12:00) >  Multiple bilateral segmental and subsegmental pulmonary emboli.Ventricular septal flattening may represent right heart strain. Recommend further evaluation with echocardiogram if clinically warranted.    < end of copied text >    - check factor  V leiden, prothrombin gene mutation test , cont ac    - < from: Transthoracic Echocardiogram (07.27.21 @ 14:01) >  1. Normal trileaflet aortic valve.  2. Normal left ventricular internal dimensions and wall  thicknesses.  3. Normal left ventricular systolic function. No segmental  wall motion abnormalities.  4. Normal right ventricular size and function.    < end of copied text >    - < from: CT Abdomen and Pelvis w/ IV Cont (07.25.21 @ 20:42) >  *  Dominant pelvic mass centered on the endometrium, concerning for endometrial malignancy. Lesion appears to invade the myometrium. No evidence of parametrial spread.  *  No evidence of metastatic disease or suspicious lymphadenopathy in the abdomen and pelvis.    - gyn evaluated pt -  biopsy done today    -Microcytic anemia- no signs of active bleeding at present,   had extensive lengthy discussion with pt about pts current clinical status , management plan , all questions answered  [facility] code [id number]
pt with insig past med hx p/w cob on exertion, pedal edema c/w   < from: US Duplex Venous Lower Ext Complete, Bilateral (07.24.21 @ 09:41) >  Acute above-the-knee left popliteal vein thrombus.    < end of copied text >    < from: CT Angio Chest PE Protocol w/ IV Cont (07.24.21 @ 12:00) >  Multiple bilateral segmental and subsegmental pulmonary emboli.Ventricular septal flattening may represent right heart strain. Recommend further evaluation with echocardiogram if clinically warranted.    < end of copied text >    - check factor  V leiden, prothrombin gene mutation test , start heparin drip   -tele, echo to check for rv strain   - < from: CT Abdomen and Pelvis w/ IV Cont (07.25.21 @ 20:42) >  *  Dominant pelvic mass centered on the endometrium, concerning for endometrial malignancy. Lesion appears to invade the myometrium. No evidence of parametrial spread.  *  No evidence of metastatic disease or suspicious lymphadenopathy in the abdomen and pelvis.    - gyn evaluated pt - poss biopsy  -Microcytic anemia- no signs of active bleeding at present, iron studies, heme eval  had extensive lengthy discussion with pt about pts current clinical status , management plan , all questions answered  [facility] code [id number]
44 yo F w/ sig PmHx p/w ~3 months of intermittent partial lip swelling, leg swelling, nonproductive cough, and SIERRA.      Problem/Plan - 1:  ·  Problem: Acute pulmonary embolism without acute cor pulmonale, unspecified pulmonary embolism type.  Plan: Presented with Dyspnea  Acute PE on CTPA  Unprovoked DVT/PE  On Heparin gtt awaiting Endometrial Biopsy  No RV strain on TTE  Eventual transition to Eliquis after biopsy     Problem/Plan - 2:  ·  Problem: Acute deep vein thrombosis (DVT) of popliteal vein of left lower extremity.  Plan: Unprovoked DVT  On Heparin gtt.     Problem/Plan - 3:  ·  Problem: Endometrial mass.  Plan: Found on CT Abdomen  No evidence for metastatic spread   MRI Pelvis  c/w adenomyosis  For biopsy today
46 yo F w/ sig PmHx p/w ~3 months of intermittent partial lip swelling, leg swelling, nonproductive cough, and SIERRA found to have DVT PE     DVT/PE  - pt found to have Acute above-the-knee left popliteal vein thrombus.  - Multiple bilateral segmental and subsegmental pulmonary emboli. Ventricular septal flattening may represent right heart strain  -ct a/p with *  Dominant pelvic mass centered on the endometrium, concerning for endometrial malignancy. Lesion appears to invade the myometrium. No evidence of parametrial spread.No evidence of metastatic disease or suspicious lymphadenopathy in the abdomen and pelvis.  -s/p biopsy, will f/u results    Endometrial Mass  s/p bx  obgyn f/u    anemia   - mild microcytic anemia   -ferritin in mildly low range, can receive out pt IV iron post completion of menstrual period  -start oral iron ferrous sulfate 325 mg once daily    likely dc soon will arrange f/u for her hematology issues    Thank you for the courtesy of this consultation and we will continue to follow.    Lj Borges MD  [facility]  Cell: [phone]  
46 yo F w/ sig PmHx p/w ~3 months of intermittent partial lip swelling, leg swelling, nonproductive cough, and SIERRA.     Lower extremity duplex showed acute above the knee left popliteal vein thrombus.   CTA of the chest showed multiple bilateral segmental and subsegmental pulmonary emboli.   TTE was unremarkbale.   On CT A/P, there was a dominant pelvic mass centered on the endometrium concerning for endometrial malignancy.   Pelvic MRI showed enlarged globular uterus with findings typical for diffuse adenomyosis, with no evidence of uterine malignancy.    Problem/Plan - 1:  ·  Problem: Acute pulmonary embolism without acute cor pulmonale, .  Plan: Presented with Dyspnea  Acute PE on CTPA  Unprovoked DVT/PE  No RV strain on TTE  Resumed Eliquis   Outpatient follow up with [facility] for Hypercoagulopathy work-up    Problem/Plan - 2:  ·  Problem: Acute deep vein thrombosis (DVT) of popliteal vein of left lower extremity.  Plan: Unprovoked DVT  On Eliquis  Problem/Plan - 3:  ·  Problem: Endometrial mass.  Plan: Found on CT Abdomen  No evidence for metastatic spread   MRI Pelvis  c/w adenomyosis  Had endometrial biopsy-Outpatient follow up
pt with insig past med hx p/w cob on exertion, pedal edema c/w   < from: US Duplex Venous Lower Ext Complete, Bilateral (07.24.21 @ 09:41) >  Acute above-the-knee left popliteal vein thrombus.    < end of copied text >    < from: CT Angio Chest PE Protocol w/ IV Cont (07.24.21 @ 12:00) >  Multiple bilateral segmental and subsegmental pulmonary emboli.Ventricular septal flattening may represent right heart strain. Recommend further evaluation with echocardiogram if clinically warranted.    < end of copied text >    - check factor  V leiden, prothrombin gene mutation test , start heparin drip   -tele, echo to check for rv strain   - < from: CT Abdomen and Pelvis w/ IV Cont (07.25.21 @ 20:42) >  *  Dominant pelvic mass centered on the endometrium, concerning for endometrial malignancy. Lesion appears to invade the myometrium. No evidence of parametrial spread.  *  No evidence of metastatic disease or suspicious lymphadenopathy in the abdomen and pelvis.    - gyn evaluated pt - poss biopsy  -Microcytic anemia- no signs of active bleeding at present, iron studies, heme eval  had extensive lengthy discussion with pt about pts current clinical status , management plan , all questions answered  [facility] code [id number]
46 yo F w/ sig PmHx p/w ~3 months of intermittent partial lip swelling, leg swelling, nonproductive cough, and SIERRA found to have DVT PE     DVT/PE  - pt found to have Acute above-the-knee left popliteal vein thrombus.  - Multiple bilateral segmental and subsegmental pulmonary emboli. Ventricular septal flattening may represent right heart strain  - now on apixaban  - pt with trip to [place] last week on plane for about 2 hours, likely did not contribute  -ct a/p with *  Dominant pelvic mass centered on the endometrium, concerning for endometrial malignancy. Lesion appears to invade the myometrium. No evidence of parametrial spread.No evidence of metastatic disease or suspicious lymphadenopathy in the abdomen and pelvis.  -defer hypercoag w/u given ct findings    Endometrial Mass  -Recommend pelvic MRI to further evaluation mass  -as per obgyn, Defer EMBx as patient is currently menstruating, likely to be performed on outpatient basis  -Final recommendations pending MRI  -pt works at [place] may seek care there    anemia   - mild microcytic anemia   -ferritin in mildly low range, can receive out pt IV iron post completion of menstrual period  -start oral iron ferrous sulfate 325 mg once daily  f/u hgb electrophoresis  - keep hg>7       Lj Borges MD  [facility]  Cell: [phone]      Lj Borges MD  [facility]  Cell: [phone]    
44 yo F w/ sig PmHx p/w ~3 months of intermittent partial lip swelling, leg swelling, nonproductive cough, and SIERRA.   Pt denies CP, palp, skin changes, rashes. She does note ~60 lb weight gain but attributes that to lifestyle changes during the pandemic.       Problem/Plan - 1:  ·  Problem: Acute pulmonary embolism without acute cor pulmonale, unspecified pulmonary embolism type.  Plan: Presented with Dyspnea  Acute PE on CTPA  Unprovoked DVT/PE    Awaiting TTE RV strain  Transitioned to Eliquis. 10mg BID for 7 days then 5mg BID    Problem/Plan - 2:  ·  Problem: Acute deep vein thrombosis (DVT) of popliteal vein of left lower extremity.  Plan: Unprovoked DVT   CT Abdomen revealing endometrial mass  Gyn consult for possible biopsy

## 2021-07-31 NOTE — PROGRESS NOTE ADULT - SUBJECTIVE AND OBJECTIVE BOX
Pt seen, feeling well, tolerating AC      MEDICATIONS  (STANDING):  apixaban 10 milliGRAM(s) Oral every 12 hours  guaiFENesin ER 1200 milliGRAM(s) Oral every 12 hours  pantoprazole    Tablet 40 milliGRAM(s) Oral before breakfast  polyethylene glycol 3350 17 Gram(s) Oral daily  senna 2 Tablet(s) Oral at bedtime    MEDICATIONS  (PRN):  melatonin 6 milliGRAM(s) Oral at bedtime PRN Insomnia  oxycodone    5 mG/acetaminophen 325 mG 1 Tablet(s) Oral every 6 hours PRN Severe Pain (7 - 10)      ROS  No fever, sweats, chills  No epistaxis, HA, sore throat  No CP, SOB, cough, sputum  No n/v/d, abd pain, melena, hematochezia  No edema  No rash  No anxiety  No back pain, joint pain  No bleeding, bruising  No dysuria, hematuria    Vital Signs Last 24 Hrs  T(C): 36.6 (31 Jul 2021 12:09), Max: 36.6 (31 Jul 2021 12:09)  T(F): 97.8 (31 Jul 2021 12:09), Max: 97.8 (31 Jul 2021 12:09)  HR: 77 (31 Jul 2021 12:09) (77 - 87)  BP: 119/77 (31 Jul 2021 12:09) (104/72 - 119/77)  BP(mean): --  RR: 17 (31 Jul 2021 12:09) (17 - 18)  SpO2: 99% (31 Jul 2021 12:09) (98% - 99%)    PE  NAD  Awake, alert  Anicteric, MMM  RRR  CTAB  Abd soft, NT, ND  No c/c/e  No rash grossly  FROM                          9.8    6.24  )-----------( 475      ( 31 Jul 2021 06:45 )             32.6       07-31    139  |  103  |  12  ----------------------------<  135<H>  4.4   |  21<L>  |  0.81    Ca    9.5      31 Jul 2021 06:45  Phos  4.3     07-31  Mg     1.70     07-31

## 2021-07-31 NOTE — PROGRESS NOTE ADULT - NSICDXPILOT_GEN_ALL_CORE
[place]

## 2021-08-02 LAB
INTERPRETATION SERPL IFE-IMP: SIGNIFICANT CHANGE UP
SURGICAL PATHOLOGY STUDY: SIGNIFICANT CHANGE UP

## 2021-08-03 ENCOUNTER — TRANSCRIPTION ENCOUNTER (OUTPATIENT)
Age: 46
End: 2021-08-03

## 2021-08-04 ENCOUNTER — APPOINTMENT (OUTPATIENT)
Dept: OBGYN | Facility: CLINIC | Age: 46
End: 2021-08-04
Payer: COMMERCIAL

## 2021-08-04 VITALS
WEIGHT: 234.5 LBS | HEART RATE: 93 BPM | BODY MASS INDEX: 33.57 KG/M2 | HEIGHT: 70 IN | DIASTOLIC BLOOD PRESSURE: 81 MMHG | SYSTOLIC BLOOD PRESSURE: 125 MMHG

## 2021-08-04 DIAGNOSIS — Z98.890 OTHER SPECIFIED POSTPROCEDURAL STATES: ICD-10-CM

## 2021-08-04 DIAGNOSIS — Z87.59 PERSONAL HISTORY OF OTHER COMPLICATIONS OF PREGNANCY, CHILDBIRTH AND THE PUERPERIUM: ICD-10-CM

## 2021-08-04 DIAGNOSIS — N89.8 OTHER SPECIFIED NONINFLAMMATORY DISORDERS OF VAGINA: ICD-10-CM

## 2021-08-04 DIAGNOSIS — Z12.39 ENCOUNTER FOR OTHER SCREENING FOR MALIGNANT NEOPLASM OF BREAST: ICD-10-CM

## 2021-08-04 DIAGNOSIS — R92.2 INCONCLUSIVE MAMMOGRAM: ICD-10-CM

## 2021-08-04 PROCEDURE — 99213 OFFICE O/P EST LOW 20 MIN: CPT

## 2021-08-04 RX ORDER — NAPROXEN 500 MG/1
500 TABLET ORAL
Qty: 30 | Refills: 2 | Status: COMPLETED | COMMUNITY
Start: 2019-05-08 | End: 2021-08-04

## 2021-08-04 RX ORDER — APIXABAN 5 MG/1
TABLET, FILM COATED ORAL
Refills: 0 | Status: ACTIVE | COMMUNITY

## 2021-08-04 RX ORDER — SULFAMETHOXAZOLE AND TRIMETHOPRIM 800; 160 MG/1; MG/1
800-160 TABLET ORAL
Qty: 6 | Refills: 0 | Status: COMPLETED | COMMUNITY
Start: 2018-10-08 | End: 2021-08-04

## 2021-08-04 NOTE — COUNSELING
[Nutrition/ Exercise/ Weight Management] : nutrition, exercise, weight management [Vitamins/Supplements] : vitamins/supplements [Breast Self Exam] : breast self exam [Contraception/ Emergency Contraception/ Safe Sexual Practices] : contraception, emergency contraception, safe sexual practices [Medication Management] : medication management [Pre/Post Op Instructions] : pre/post op instructions

## 2021-08-05 LAB
C TRACH RRNA SPEC QL NAA+PROBE: NOT DETECTED
CANDIDA VAG CYTO: NOT DETECTED
G VAGINALIS+PREV SP MTYP VAG QL MICRO: DETECTED
HPV HIGH+LOW RISK DNA PNL CVX: NOT DETECTED
N GONORRHOEA RRNA SPEC QL NAA+PROBE: NOT DETECTED
SOURCE TP AMPLIFICATION: NORMAL
T VAGINALIS VAG QL WET PREP: NOT DETECTED

## 2021-08-06 ENCOUNTER — EMERGENCY (EMERGENCY)
Facility: HOSPITAL | Age: 46
LOS: 1 days | Discharge: HOME | End: 2021-08-06
Attending: STUDENT IN AN ORGANIZED HEALTH CARE EDUCATION/TRAINING PROGRAM | Admitting: STUDENT IN AN ORGANIZED HEALTH CARE EDUCATION/TRAINING PROGRAM
Payer: COMMERCIAL

## 2021-08-06 VITALS
HEART RATE: 92 BPM | HEIGHT: 70 IN | SYSTOLIC BLOOD PRESSURE: 152 MMHG | TEMPERATURE: 98 F | RESPIRATION RATE: 18 BRPM | DIASTOLIC BLOOD PRESSURE: 96 MMHG | OXYGEN SATURATION: 96 %

## 2021-08-06 VITALS
RESPIRATION RATE: 18 BRPM | HEART RATE: 78 BPM | OXYGEN SATURATION: 98 % | DIASTOLIC BLOOD PRESSURE: 84 MMHG | TEMPERATURE: 98 F | SYSTOLIC BLOOD PRESSURE: 127 MMHG

## 2021-08-06 LAB
ALBUMIN SERPL ELPH-MCNC: 3.9 G/DL — SIGNIFICANT CHANGE UP (ref 3.3–5)
ALP SERPL-CCNC: 81 U/L — SIGNIFICANT CHANGE UP (ref 40–120)
ALT FLD-CCNC: 15 U/L — SIGNIFICANT CHANGE UP (ref 4–33)
ANION GAP SERPL CALC-SCNC: 12 MMOL/L — SIGNIFICANT CHANGE UP (ref 7–14)
APTT BLD: 23.2 SEC — LOW (ref 27–36.3)
AST SERPL-CCNC: 26 U/L — SIGNIFICANT CHANGE UP (ref 4–32)
BASOPHILS # BLD AUTO: 0.06 K/UL — SIGNIFICANT CHANGE UP (ref 0–0.2)
BASOPHILS NFR BLD AUTO: 0.8 % — SIGNIFICANT CHANGE UP (ref 0–2)
BILIRUB SERPL-MCNC: <0.2 MG/DL — SIGNIFICANT CHANGE UP (ref 0.2–1.2)
BLOOD GAS VENOUS COMPREHENSIVE RESULT: SIGNIFICANT CHANGE UP
BUN SERPL-MCNC: 10 MG/DL — SIGNIFICANT CHANGE UP (ref 7–23)
CALCIUM SERPL-MCNC: 9 MG/DL — SIGNIFICANT CHANGE UP (ref 8.4–10.5)
CHLORIDE SERPL-SCNC: 103 MMOL/L — SIGNIFICANT CHANGE UP (ref 98–107)
CO2 SERPL-SCNC: 22 MMOL/L — SIGNIFICANT CHANGE UP (ref 22–31)
CREAT SERPL-MCNC: 0.7 MG/DL — SIGNIFICANT CHANGE UP (ref 0.5–1.3)
EOSINOPHIL # BLD AUTO: 0.2 K/UL — SIGNIFICANT CHANGE UP (ref 0–0.5)
EOSINOPHIL NFR BLD AUTO: 2.8 % — SIGNIFICANT CHANGE UP (ref 0–6)
GLUCOSE SERPL-MCNC: 125 MG/DL — HIGH (ref 70–99)
HCG SERPL-ACNC: <5 MIU/ML — SIGNIFICANT CHANGE UP
HCT VFR BLD CALC: 33.5 % — LOW (ref 34.5–45)
HGB BLD-MCNC: 10 G/DL — LOW (ref 11.5–15.5)
IANC: 4.2 K/UL — SIGNIFICANT CHANGE UP (ref 1.5–8.5)
IMM GRANULOCYTES NFR BLD AUTO: 0.3 % — SIGNIFICANT CHANGE UP (ref 0–1.5)
INR BLD: 1.2 RATIO — HIGH (ref 0.88–1.16)
LIDOCAIN IGE QN: 37 U/L — SIGNIFICANT CHANGE UP (ref 7–60)
LYMPHOCYTES # BLD AUTO: 2.26 K/UL — SIGNIFICANT CHANGE UP (ref 1–3.3)
LYMPHOCYTES # BLD AUTO: 31.3 % — SIGNIFICANT CHANGE UP (ref 13–44)
MCHC RBC-ENTMCNC: 23.7 PG — LOW (ref 27–34)
MCHC RBC-ENTMCNC: 29.9 GM/DL — LOW (ref 32–36)
MCV RBC AUTO: 79.4 FL — LOW (ref 80–100)
MONOCYTES # BLD AUTO: 0.49 K/UL — SIGNIFICANT CHANGE UP (ref 0–0.9)
MONOCYTES NFR BLD AUTO: 6.8 % — SIGNIFICANT CHANGE UP (ref 2–14)
NEUTROPHILS # BLD AUTO: 4.2 K/UL — SIGNIFICANT CHANGE UP (ref 1.8–7.4)
NEUTROPHILS NFR BLD AUTO: 58 % — SIGNIFICANT CHANGE UP (ref 43–77)
NRBC # BLD: 0 /100 WBCS — SIGNIFICANT CHANGE UP
NRBC # FLD: 0 K/UL — SIGNIFICANT CHANGE UP
NT-PROBNP SERPL-SCNC: 20 PG/ML — SIGNIFICANT CHANGE UP
PLATELET # BLD AUTO: 439 K/UL — HIGH (ref 150–400)
POTASSIUM SERPL-MCNC: 4.8 MMOL/L — SIGNIFICANT CHANGE UP (ref 3.5–5.3)
POTASSIUM SERPL-SCNC: 4.8 MMOL/L — SIGNIFICANT CHANGE UP (ref 3.5–5.3)
PROT SERPL-MCNC: 7.2 G/DL — SIGNIFICANT CHANGE UP (ref 6–8.3)
PROTHROM AB SERPL-ACNC: 13.7 SEC — HIGH (ref 10.6–13.6)
RBC # BLD: 4.22 M/UL — SIGNIFICANT CHANGE UP (ref 3.8–5.2)
RBC # FLD: 14.8 % — HIGH (ref 10.3–14.5)
SARS-COV-2 RNA SPEC QL NAA+PROBE: SIGNIFICANT CHANGE UP
SODIUM SERPL-SCNC: 137 MMOL/L — SIGNIFICANT CHANGE UP (ref 135–145)
TROPONIN T, HIGH SENSITIVITY RESULT: <6 NG/L — SIGNIFICANT CHANGE UP
WBC # BLD: 7.23 K/UL — SIGNIFICANT CHANGE UP (ref 3.8–10.5)
WBC # FLD AUTO: 7.23 K/UL — SIGNIFICANT CHANGE UP (ref 3.8–10.5)

## 2021-08-06 PROCEDURE — 71045 X-RAY EXAM CHEST 1 VIEW: CPT | Mod: 26

## 2021-08-06 PROCEDURE — 99285 EMERGENCY DEPT VISIT HI MDM: CPT | Mod: 25

## 2021-08-06 PROCEDURE — 93010 ELECTROCARDIOGRAM REPORT: CPT

## 2021-08-06 NOTE — ED PROVIDER NOTE - NS ED ROS FT
CONSTITUTIONAL: No fevers, no chills, no lightheadedness, no dizziness  EYES: no visual changes, no eye pain  EARS:  no change in hearing  NOSE: no nasal congestion  MOUTH/THROAT: no sore throat  CV: See HPI   RESP: See HPI   GI: no vomiting, no diarrhea, no abd pain  : no dysuria, no hematuria, no flank pain  MSK: no back pain, no extremity pain  SKIN: no rashes  NEURO: no headache, no focal weakness, no decreased sensation/parasthesias   PSYCHIATRIC: no known mental health issues

## 2021-08-06 NOTE — ED PROVIDER NOTE - PATIENT PORTAL LINK FT
You can access the FollowMyHealth Patient Portal offered by [facility] by registering at the following website: [email or web address]. By joining [facility]’s FollowMyHealth portal, you will also be able to view your health information using other applications (apps) compatible with our system.

## 2021-08-06 NOTE — ED ADULT NURSE NOTE - OBJECTIVE STATEMENT
Arrives from home c/o acute episode of chest pressure a/w SOB at 8:45 this morning, denies activity triggered episode. Reports having persistent cough since June, was seen in the hospital and given albuterol with minimal relief states they are fits of coughing that resolve on their own. Pt reports being recently diagnosed with PE, takes Eliquis as prescribed. Denies other PMH, PIV placed, labs sent, VS as documented, will continue to monitor.

## 2021-08-06 NOTE — ED PROVIDER NOTE - PROGRESS NOTE DETAILS
Joseph Frankel PGY3: Patient completely chest pain free. Work up negative. Has good outpatient fu. Discussed plan and return precautions with patient who understands and agrees. All questions answered.

## 2021-08-06 NOTE — ED PROVIDER NOTE - OBJECTIVE STATEMENT
Patient is a 44 y/o F with PMH significant for DVT and segment/subsegmental PEs on Eliquis who presents to the ED with chest pain. Patient states that chest pain began at 8:45AM, radiated to her back, "pressure like", associated with nausea but no vomiting. Pain was 10/10, not associated with position, exertion, lightheadedness, dizziness, or syncope. Subsided after 15 minutes. No chest pain in room, but endorse worsening SOB over past week, not related to exertion. Chronic productive cough w/o hemoptysis. Was hospitalized 7/24/21 for DVT/PE.

## 2021-08-06 NOTE — ED ADULT TRIAGE NOTE - CHIEF COMPLAINT QUOTE
Pt recently diagnosed with PE/DVT on Eliquis, c/o shortness of breath, non-productive cough and mid-sternal chest pain radiating to back, breathing even and mildly labored, denies headache/dizziness, afebrile.

## 2021-08-06 NOTE — ED PROVIDER NOTE - CLINICAL SUMMARY MEDICAL DECISION MAKING FREE TEXT BOX
44 y/o F w/ pmh of PE and DVT on eliquis presenting w/ episode of chest pain radiating to back. Has subsided. Increased SOB over past week. Tachy on arrival but normal HR in room. Physical exam as above. With radiating chest pain to back, concern for dissection but unlikely given equal pulses in extremities. Also on differential: pancreatitis, ACS, PNA. Low suspicion for all. Pt recently started on Elquis, possible that dose is not adequate. CBC, CMP, CXR, trop, bnp, ekg, lipase.  Dispo- most likely admit for further evaluation. 46 y/o F w/ pmh of PE and DVT on eliquis presenting w/ episode of chest pain radiating to back. Has subsided. Increased SOB over past week. Tachy on arrival but normal HR in room. Physical exam as above. Likely MSK chest pain. With radiating chest pain to back, must consider dissection but unlikely as patient currently pain free with no dissection risk factors and pulses in extremities. Pt recently started on Elquis, possible that dose is not adequate so will sent trop and BMP to assess for possible strain but unlikely. Will get labs, imaging, and reassess.

## 2021-08-06 NOTE — ED PROVIDER NOTE - NSFOLLOWUPINSTRUCTIONS_ED_ALL_ED_FT
You were seen for chest pain.     Please continue taking your medications as directed.    Please see general chest pain information below:    Chest Pain    Chest pain can be caused by many different conditions which may or may not be dangerous. Causes include heartburn, lung infections, heart attack, blood clot in lungs, skin infections, strain or damage to muscle, cartilage, or bones, etc. In addition to a history and physical examination, an electrocardiogram (ECG) or other lab tests may have been performed to determine the cause of your chest pain. Follow up with your primary care provider or with a cardiologist as instructed.     SEEK IMMEDIATE MEDICAL CARE IF YOU HAVE ANY OF THE FOLLOWING SYMPTOMS: worsening chest pain, coughing up blood, unexplained back/neck/jaw pain, severe abdominal pain, dizziness or lightheadedness, fainting, shortness of breath, sweaty or clammy skin, vomiting, or racing heart beat. These symptoms may represent a serious problem that is an emergency. Do not wait to see if the symptoms will go away. Get medical help right away. Call 911 and do not drive yourself to the hospital.

## 2021-08-06 NOTE — ED PROVIDER NOTE - PHYSICAL EXAMINATION
General: Alert and Orientated x 3. No apparent distress.  Head: Normocephalic and atraumatic.  Eyes: PERRLA with EOMI.  Neck: Supple. Trachea midline.   Cardiac: Normal S1 and S2 w/ RRR. No murmurs appreciated. No JVD appreciated.  Pulmonary: CTA bilaterally   Abdominal: Soft, non-tender. (+) bowel sounds appreciated in all 4 quadrants. No hepatosplenomegaly.   Neurologic: No focal sensory or motor deficits. CN 2-12 grossly intact   Musculoskeletal: Strength appropriate in all 4 extremities for age with no limited ROM.  Extremities: No edema of lower extremities   Vascular: Radial pulse intact and equal. Dorsal pedal pulse intact and equal bilaterally.   Skin: Color appropriate for race. Intact, warm, and well-perfused.  Psychiatric: Appropriate mood and affect. No apparent risk to self or others.

## 2021-08-06 NOTE — ED PROVIDER NOTE - ATTENDING CONTRIBUTION TO CARE
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 States pain sudden onset, ~9am today, radiating to back Decscribes as pressure-like pain, resolved en route to ED. +nasuea, no vomiting. Does report family have friends have told her that her SOB appears worse the past week but she in uncertain if there has been a change. +Chronic cough w/o hemoptysis. No fever/chills, abd pain, dizziness/lightheadness. No known sick contacts or recent travels  Gen: nad  CV: rrr, no murmur  Pulm: breathing unlabored, lungs clear b/l  Abd: soft, nt,nd  Ext: swelling of RLE > LLE baseline per patient  MDM: 45F h/o DVT/PE on Eliquis p/w chest pain - low suspicion ACS given presentation, sat high 90s on room air - will get EKG, check trop and BNP to assess for evidence of strain from PE, basic labs, CXR

## 2021-08-09 ENCOUNTER — NON-APPOINTMENT (OUTPATIENT)
Age: 46
End: 2021-08-09

## 2021-08-10 LAB — CYTOLOGY CVX/VAG DOC THIN PREP: ABNORMAL

## 2021-08-11 PROBLEM — I26.99 OTHER PULMONARY EMBOLISM WITHOUT ACUTE COR PULMONALE: Chronic | Status: ACTIVE | Noted: 2021-08-06

## 2021-08-11 PROBLEM — I82.409 ACUTE EMBOLISM AND THROMBOSIS OF UNSPECIFIED DEEP VEINS OF UNSPECIFIED LOWER EXTREMITY: Chronic | Status: ACTIVE | Noted: 2021-08-06

## 2021-08-13 ENCOUNTER — RESULT REVIEW (OUTPATIENT)
Age: 46
End: 2021-08-13

## 2021-08-13 ENCOUNTER — APPOINTMENT (OUTPATIENT)
Dept: ULTRASOUND IMAGING | Facility: IMAGING CENTER | Age: 46
End: 2021-08-13
Payer: COMMERCIAL

## 2021-08-13 ENCOUNTER — OUTPATIENT (OUTPATIENT)
Dept: OUTPATIENT SERVICES | Facility: HOSPITAL | Age: 46
LOS: 1 days | End: 2021-08-13
Payer: COMMERCIAL

## 2021-08-13 ENCOUNTER — APPOINTMENT (OUTPATIENT)
Dept: MAMMOGRAPHY | Facility: IMAGING CENTER | Age: 46
End: 2021-08-13
Payer: COMMERCIAL

## 2021-08-13 DIAGNOSIS — R92.2 INCONCLUSIVE MAMMOGRAM: ICD-10-CM

## 2021-08-13 PROCEDURE — 77067 SCR MAMMO BI INCL CAD: CPT

## 2021-08-13 PROCEDURE — 77067 SCR MAMMO BI INCL CAD: CPT | Mod: 26

## 2021-08-13 PROCEDURE — 76641 ULTRASOUND BREAST COMPLETE: CPT | Mod: 26,50

## 2021-08-13 PROCEDURE — 77063 BREAST TOMOSYNTHESIS BI: CPT

## 2021-08-13 PROCEDURE — 76641 ULTRASOUND BREAST COMPLETE: CPT

## 2021-08-13 PROCEDURE — 77063 BREAST TOMOSYNTHESIS BI: CPT | Mod: 26

## 2021-08-20 ENCOUNTER — RESULT REVIEW (OUTPATIENT)
Age: 46
End: 2021-08-20

## 2021-08-20 ENCOUNTER — OUTPATIENT (OUTPATIENT)
Dept: OUTPATIENT SERVICES | Facility: HOSPITAL | Age: 46
LOS: 1 days | End: 2021-08-20
Payer: COMMERCIAL

## 2021-08-20 ENCOUNTER — APPOINTMENT (OUTPATIENT)
Dept: ULTRASOUND IMAGING | Facility: IMAGING CENTER | Age: 46
End: 2021-08-20
Payer: COMMERCIAL

## 2021-08-20 DIAGNOSIS — Z00.8 ENCOUNTER FOR OTHER GENERAL EXAMINATION: ICD-10-CM

## 2021-08-20 PROCEDURE — 76641 ULTRASOUND BREAST COMPLETE: CPT | Mod: 26,LT

## 2021-08-20 PROCEDURE — 76642 ULTRASOUND BREAST LIMITED: CPT

## 2021-08-20 PROCEDURE — 76641 ULTRASOUND BREAST COMPLETE: CPT

## 2021-08-20 PROCEDURE — 76642 ULTRASOUND BREAST LIMITED: CPT | Mod: 26,RT

## 2021-09-14 ENCOUNTER — EMERGENCY (EMERGENCY)
Facility: HOSPITAL | Age: 46
LOS: 1 days | Discharge: HOME | End: 2021-09-14
Attending: EMERGENCY MEDICINE | Admitting: EMERGENCY MEDICINE
Payer: COMMERCIAL

## 2021-09-14 VITALS
TEMPERATURE: 97 F | SYSTOLIC BLOOD PRESSURE: 132 MMHG | DIASTOLIC BLOOD PRESSURE: 79 MMHG | RESPIRATION RATE: 18 BRPM | HEART RATE: 95 BPM | OXYGEN SATURATION: 100 % | HEIGHT: 70 IN

## 2021-09-14 PROCEDURE — 99284 EMERGENCY DEPT VISIT MOD MDM: CPT

## 2021-09-15 VITALS
HEART RATE: 94 BPM | SYSTOLIC BLOOD PRESSURE: 148 MMHG | OXYGEN SATURATION: 100 % | DIASTOLIC BLOOD PRESSURE: 89 MMHG | TEMPERATURE: 98 F | RESPIRATION RATE: 18 BRPM

## 2021-09-15 PROCEDURE — 93970 EXTREMITY STUDY: CPT | Mod: 26

## 2021-09-15 NOTE — ED PROVIDER NOTE - PROGRESS NOTE DETAILS
Seth, DO PGY-3: US without evidence of DVT, discussed results with patient. Discussed swelling may be positional but would still require further evaluation. Patient states she will be scheduling appt with Dr. Long (cards)

## 2021-09-15 NOTE — ED PROVIDER NOTE - OBJECTIVE STATEMENT
44 y/o F with PMH of DVT/PE on eliquis presenting with b/l LE swelling. States she has had swelling for last 1-2 weeks. Felt it was slightly worse today, also states she has pain of L sole of foot which she said she felt when she initially had DVT. Patient reports her sister thinks she looks short of breath but patient denies feeling SOB despite noted in triage note. Denies chest pain, fevers, chills, cough, n/v/d, abd pain.    States she spoke to her hematologist who referred her to the ED for further evaluation of her LE swelling

## 2021-09-15 NOTE — ED PROVIDER NOTE - PHYSICAL EXAMINATION
gen: well appearing  Mentation: AAO x 3  psych: mood appropriate  ENT: airway patent  Eyes: conjunctivae clear bilaterally  Cardio: RRR, no m/r/g  Resp: normal BS b/l, breathing unlabored  GI: s/nt/nd  : no CVA tenderness  Neuro: sensation and motor function intact  Skin: No evidence of rash  MSK: normal movement of all extremities  Lymph/Vasc: +b/l LE edema, no calf tenderness

## 2021-09-15 NOTE — ED ADULT NURSE NOTE - OBJECTIVE STATEMENT
Pt received to room 4 A&Ox4 and ambulatory. Pt C/O new onset of B/L LE edema, onset 2 hours ago. Pt denies pain to LE at this time. 2+ edema noted. Pt states family noticed new onset of SOB. PMHx DVT, currently on eliquis. MD parekh in progress. Vitals per flowsheet. Awaiting CT scan.

## 2021-09-15 NOTE — ED PROVIDER NOTE - NSFOLLOWUPINSTRUCTIONS_ED_ALL_ED_FT
Leg Edema    WHAT YOU NEED TO KNOW:    Leg edema is swelling caused by fluid buildup. Your legs may swell if you sit or stand for long periods of time, are pregnant, or are injured. Swelling may also occur if you have heart failure or circulation problems. This means that your heart does not pump blood through your body as it should.    Lower Leg Edema      DISCHARGE INSTRUCTIONS:    Call your local emergency number (911 in the [place]) for any of the following:   •You cannot walk.    •You have chest pain or trouble breathing that is worse when you lie down.    •You suddenly feel lightheaded and have trouble breathing.    •You have new and sudden chest pain. You may have more pain when you take deep breaths or cough.    •You cough up blood.      Return to the emergency department if:   •You feel faint or confused.    •Your skin turns blue or gray.    •Your leg feels warm, tender, and painful. It may be swollen and red.      Call your doctor if:   •You have a fever or feel more tired than usual.    •The veins in your legs look larger than usual. They may look full or bulging.    •Your legs itch or feel heavy.    •You have red or white areas or sores on your legs. The skin may also appear dimpled or have indentations.    •You are gaining weight.    •You have trouble moving your ankles.    •The swelling does not go away, or other parts of your body swell.    •You have questions or concerns about your condition or care.      Self-care:   •Elevate your legs. Raise your legs above the level of your heart as often as you can. This will help decrease swelling and pain. Prop your legs on pillows or blankets to keep them elevated comfortably.    •Wear pressure stockings, if directed. These tight stockings put pressure on your legs to promote blood flow and prevent blood clots. Put them on before you get out of bed. Wear the stockings during the day. Do not wear them while you sleep.    •Stay active. Do not stand or sit for long periods of time. Ask your healthcare provider about the best exercise plan for you.    •Eat healthy foods. Healthy foods include fruits, vegetables, whole-grain breads, low-fat dairy products, beans, lean meats, and fish. Ask if you need to be on a special diet.    •Limit sodium (salt). Salt will make your body hold even more fluid. Your healthcare provider will tell you how many milligrams (mg) of salt you can have each day.         Follow up with your doctor in 2-3 days: Write down your questions so you remember to ask them during your visits.

## 2021-09-15 NOTE — ED PROVIDER NOTE - CLINICAL SUMMARY MEDICAL DECISION MAKING FREE TEXT BOX
DO Seth PGY-3: 44 y/o F presenting with b/l LE swelling, already on AC for DVT/PE. Will US b/l LE to eval for increased clot burden or new DVT in RLE. Edema may be positional, likely DC pending US

## 2021-09-15 NOTE — ED PROVIDER NOTE - ATTENDING CONTRIBUTION TO CARE
HPI: 44 y/o F with Past Medical History of DVT/PE on eliquis presenting with b/l LE swelling. States she has had swelling for last 1-2 weeks. Felt it was slightly worse today, also states she has pain of L sole of foot which she said she felt when she initially had DVT. Patient reports her sister thinks she looks short of breath but patient denies feeling SOB despite noted in triage note. Denies chest pain, fevers, chills, cough, n/v/d, abd pain. States she spoke to her hematologist who referred her to the ED for further evaluation of her LE swelling  EXAM: NAD, heart RRR, lungs ctab, abd soft nontender, BLE without unilateral swelling but has 1+ pitting edema equal in both legs. normal pulses and warm to touch.   MDM: pt with DVT/PE on Eliquis that presents with leg swelling which is concerning to her for another DVT. Benign exam. Pt denies SOB or chest pain and not tachy and normal pulse ox here. Likely no PE and/or DVT but will obtain US and reassess. Pt already on eliquis but if POS for DVT will likely have to switch her meds vs admit for filter.

## 2021-09-15 NOTE — ED PROVIDER NOTE - PATIENT PORTAL LINK FT
You can access the FollowMyHealth Patient Portal offered by [facility] by registering at the following website: [email or web address]. By joining [employer]’s FollowMyHealth portal, you will also be able to view your health information using other applications (apps) compatible with our system.

## 2021-09-15 NOTE — ED ADULT NURSE NOTE - NSIMPLEMENTINTERV_GEN_ALL_ED
Implemented All Universal Safety Interventions:  [place] to call system. Call bell, personal items and telephone within reach. Instruct patient to call for assistance. Room bathroom lighting operational. Non-slip footwear when patient is off stretcher. Physically safe environment: no spills, clutter or unnecessary equipment. Stretcher in lowest position, wheels locked, appropriate side rails in place.

## 2021-09-15 NOTE — ED PROVIDER NOTE - NS ED ROS FT
CONSTITUTIONAL: No fevers, no chills, no lightheadedness, no dizziness  Eyes: no visual changes  Ears: no ear drainage, no ear pain  Nose: no nasal congestion  Mouth/Throat: no sore throat  CV: No chest pain, no palpitations  PULM: No SOB, no cough  GI: No n/v/d, no abd pain  : no dysuria, no hematuria  SKIN: no rashes.  NEURO: no headache, no focal weakness or numbness  LYMPH/VASC: +b/l LE swelling

## 2021-10-09 ENCOUNTER — EMERGENCY (EMERGENCY)
Facility: HOSPITAL | Age: 46
LOS: 1 days | Discharge: HOME | End: 2021-10-09
Attending: EMERGENCY MEDICINE | Admitting: EMERGENCY MEDICINE
Payer: COMMERCIAL

## 2021-10-09 VITALS
OXYGEN SATURATION: 98 % | SYSTOLIC BLOOD PRESSURE: 129 MMHG | HEART RATE: 94 BPM | DIASTOLIC BLOOD PRESSURE: 76 MMHG | RESPIRATION RATE: 18 BRPM | HEIGHT: 70 IN | TEMPERATURE: 98 F

## 2021-10-09 VITALS
DIASTOLIC BLOOD PRESSURE: 74 MMHG | OXYGEN SATURATION: 100 % | RESPIRATION RATE: 18 BRPM | HEART RATE: 84 BPM | SYSTOLIC BLOOD PRESSURE: 136 MMHG

## 2021-10-09 PROCEDURE — 99284 EMERGENCY DEPT VISIT MOD MDM: CPT

## 2021-10-09 RX ORDER — FAMOTIDINE 10 MG/ML
20 INJECTION INTRAVENOUS ONCE
Refills: 0 | Status: COMPLETED | OUTPATIENT
Start: 2021-10-09 | End: 2021-10-09

## 2021-10-09 RX ADMIN — FAMOTIDINE 20 MILLIGRAM(S): 10 INJECTION INTRAVENOUS at 03:56

## 2021-10-09 RX ADMIN — Medication 50 MILLIGRAM(S): at 03:56

## 2021-10-09 NOTE — ED PROVIDER NOTE - NSFOLLOWUPINSTRUCTIONS_ED_ALL_ED_FT
Please see attached information on "angioedema"     Return to the ER for new or worsening swelling, trouble breathing, or any other concerning symptoms.     Follow up with your allergist and PCP.      your prescription of prednisone and take as directed.     You can take benadryl as needed for persistent swelling/itching.

## 2021-10-09 NOTE — ED ADULT NURSE NOTE - OBJECTIVE STATEMENT
received pt in room 23 c/o facial swelling and productive cough.. no sob, cp.  pt has hx of multiple allergic reactions and dx'ed with dvt and pe's in july..on Eliquis.   has appt with rheumatology on Tuesday.  speech clear.  no drooling or stridor.  given meds as ordered.  will continue to monitor.

## 2021-10-09 NOTE — ED ADULT NURSE REASSESSMENT NOTE - CONDITION
reports feeling better.  no sob, cp, stridor, drooling.  cleared for dc. given dc instructions by md

## 2021-10-09 NOTE — ED PROVIDER NOTE - PROGRESS NOTE DETAILS
Pt reassessed. No wheezes. Sleeping comfortably. Denies worsening edema. Airway patent. Has an epi pen at home Will DC w steroids and outpt follow up. MARK Carlton PGY3

## 2021-10-09 NOTE — ED PROVIDER NOTE - OBJECTIVE STATEMENT
46 y/o F with PMH of DVT/PE on eliquis, angioedema without known cause presenting with CC of facial swelling that started around 10pm. Pt denies any new beauty products, soaps. No new foods. Started off as L lip swelling then spread to inner L cheek and R lip. +SOB and vomiting. Pt took benadryl - 4 pills without resolution of symptoms. Has an epi pen but did not end up using it.

## 2021-10-09 NOTE — ED PROVIDER NOTE - ATTENDING CONTRIBUTION TO CARE
I performed a face-to-face evaluation of the patient and performed a history and physical examination along with the resident. I agree with the history and physical examination as documented by the resident above.  Hall:  46yo F w/ pmh as above p/w lip swelling since approx 10pm, associated w/ throat itching at that time and 1 episode of nbnb emesis, similar to allergic rxn in the past, took benadryl at home (total of 100mg) and feeling better though lip swelling still present. Pt saw allergist months ago after first incident similar to this but allergist was not able to identify allergen. Not on any meds that would cause angioedema. No family hx of angioedema. Other than lip swelling, no sign of oropharyngeal edema on exam; normal lung sounds -therefore no indication for epi at this time. will give prednisone, monitor for few hours, reassess and likely dc for outpt allergist f/u. Pt does have epi pens at home and has been instructed on how and when to use them.

## 2021-10-09 NOTE — ED ADULT TRIAGE NOTE - CHIEF COMPLAINT QUOTE
C/o angioedema that began at 10pm. Reports taking 50mg benadryl without relief. Endorses intermittent SOB and itchiness to lips. States she has experienced similar symptoms in the past, unsure what she is allergic to. Received epi for previous reaction. No drooling or stridor noted. States currently on Eliquis for left DVT and bilateral PE. Denies history of intubation.

## 2021-10-09 NOTE — ED PROVIDER NOTE - CLINICAL SUMMARY MEDICAL DECISION MAKING FREE TEXT BOX
45F presenting for angioedema. +vomiting. On exam no wheezing or oropharyngeal edema. Likely similar to known angioedema episodes in the past. Based on timeline and pt's current exam, no epi necessary. Will start steroids and monitor. Sierra Davey, MARK PGY3

## 2021-10-09 NOTE — ED PROVIDER NOTE - PATIENT PORTAL LINK FT
You can access the FollowMyHealth Patient Portal offered by [facility] by registering at the following website: [email or web address]. By joining [employer]’s FollowMyHealth portal, you will also be able to view your health information using other applications (apps) compatible with our system.

## 2021-10-09 NOTE — ED PROVIDER NOTE - NS ED ROS FT
CONST: no fevers, no chills, no trauma  EYES: no pain, no blurry vision   ENT: +throat itching, no epistaxis, no rhinorrhea  CV: no chest pain, no palpitations, no orthopnea, no extremity pain or swelling  RESP: +shortness of breath, no cough, no sputum, no pleurisy, no wheezing  ABD: no abdominal pain, + nausea, +vomiting, no diarrhea, no black or bloody stool  : no dysuria, no hematuria, no frequency, no urgency  MSK: no back pain, no neck pain, no extremity pain  NEURO: no headache, no sensory disturbances, no focal weakness, no dizziness  HEME: no easy bleeding or bruising  SKIN: no diaphoresis, no rash

## 2021-10-09 NOTE — ED PROVIDER NOTE - PHYSICAL EXAMINATION
Const: Well-nourished, Well-developed, appearing stated age.  Eyes: no conjunctival injection, and symmetrical lids.  HEENT: Head NCAT, no lesions. Atraumatic external nose and ears. Oropharynx without edema. Uvula midline. +angioedema.   Neck: Symmetric, trachea midline. No stridor.   CVS: +S1/S2, Peripheral pulses 2+ and equal in all extremities.  RESP: Unlabored respiratory effort. Clear to auscultation bilaterally, no wheezes.   GI: Nontender/Nondistended, No CVA tenderness b/l.   MSK: Normocephalic/Atraumatic, Lower Extremities w/o calf tenderness or edema b/l.   Skin: Warm, dry and intact.   Neuro: CNs II-XII grossly intact. Motor & Sensation grossly intact.  Psych: Awake, Alert, & Oriented (AAO) x3. Appropriate mood and affect.

## 2021-10-12 ENCOUNTER — APPOINTMENT (OUTPATIENT)
Dept: RHEUMATOLOGY | Facility: CLINIC | Age: 46
End: 2021-10-12
Payer: COMMERCIAL

## 2021-10-12 VITALS
BODY MASS INDEX: 35.1 KG/M2 | TEMPERATURE: 97.5 F | RESPIRATION RATE: 14 BRPM | WEIGHT: 237 LBS | HEIGHT: 69 IN | OXYGEN SATURATION: 99 % | DIASTOLIC BLOOD PRESSURE: 87 MMHG | HEART RATE: 82 BPM | SYSTOLIC BLOOD PRESSURE: 133 MMHG

## 2021-10-12 DIAGNOSIS — T78.3XXA ANGIONEUROTIC EDEMA, INITIAL ENCOUNTER: ICD-10-CM

## 2021-10-12 PROCEDURE — 99204 OFFICE O/P NEW MOD 45 MIN: CPT

## 2021-10-12 NOTE — PHYSICAL EXAM
[General Appearance - Well Developed] : well developed [Auscultation Breath Sounds / Voice Sounds] : lungs were clear to auscultation bilaterally [FreeTextEntry1] : obese [Heart Sounds] : normal S1 and S2 [Heart Sounds Gallop] : no gallops [Murmurs] : no murmurs [Heart Sounds Pericardial Friction Rub] : no pericardial rub [Abdomen Soft] : soft [Abdomen Tenderness] : non-tender [Cervical Lymph Nodes Enlarged Posterior Bilaterally] : posterior cervical [Cervical Lymph Nodes Enlarged Anterior Bilaterally] : anterior cervical [Axillary Lymph Nodes Enlarged Bilaterally] : axillary [Supraclavicular Lymph Nodes Enlarged Bilaterally] : supraclavicular [Musculoskeletal - Swelling] : no joint swelling seen [] : no rash [Skin Lesions] : no skin lesions [Oriented To Time, Place, And Person] : oriented to person, place, and time

## 2021-10-12 NOTE — DATA REVIEWED
[FreeTextEntry1] : Labs reviewed from 8/21\par Hbg 10s, MCV 79, plts 400s\par \par Labs reviewed from 7/21\par ACE levels, C1 esterase levels wnl \par C3, C4, SPEP, TSH, LA, cardiolipin, b2gp, GEE, DsDNA, Leiden Factor V, protein C, S wnl/negative

## 2021-10-12 NOTE — ASSESSMENT
[FreeTextEntry1] : 46 y/o F w episodes of angioedema, and hx of thrombosis\par =episodes of lip, facial swelling\par =hx of b/l DVT w PE\par =GEE, DsDNA, APLS serologies negative\par \par At this point, there is not further work up from a rheumatologic perspective that will explain pt's symptoms. 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 Should f/u w allergists for tx. \par As far as thrombotic event, pt negative for APLS abx. Also negative for other thrombophilias. Tx per primary care/heme. \par \par Plan\par No further work up\par f/u allergist, PCP, heme\par RTO PRN \par

## 2021-10-12 NOTE — HISTORY OF PRESENT ILLNESS
[FreeTextEntry1] : 44 y/o F here for initial visit\par \par Complains having allergic reaction, and getting face itching, and pruritus. Pt went to A&I, and allergic panel negative. Pt told she is allergic to something unknown. \par Pt was dx w angioedema. \par Last allergic reaction on Friday. \par Pt also has wheezing. \par Went [place], to  and had b/l PE, DVT. \par On aldactone because of LE swelling. \par Reports TTE negative. \par Took off wellbutrin for possible allergies. \par No smoking, no hooka. \par \par No fevers, h/a, rashes, hair loss, oral ulcers, epistaxis, sinusitis,  swollen glands, dry mouth, dry eyes, CP, SOB, cough, vision changes, abdominal pain, GERD, n/v/d, blood in stool or urine, focal weakness, sensory loss,  Raynaud's, joint pain, swelling, weight loss. \par \par OB: 1 miscarriage, earlly in pregnancy \par 1 elective

## 2022-01-13 ENCOUNTER — APPOINTMENT (OUTPATIENT)
Dept: OBGYN | Facility: CLINIC | Age: 47
End: 2022-01-13

## 2022-02-03 ENCOUNTER — APPOINTMENT (OUTPATIENT)
Dept: OBGYN | Facility: CLINIC | Age: 47
End: 2022-02-03

## 2022-05-10 ENCOUNTER — APPOINTMENT (OUTPATIENT)
Dept: OBGYN | Facility: CLINIC | Age: 47
End: 2022-05-10

## 2022-05-16 NOTE — ED ADULT TRIAGE NOTE - CHIEF COMPLAINT QUOTE
[Time Spent: ___ minutes] : I have spent [unfilled] minutes of time on the encounter. pt c/o b/l LE edema x2 hrs, with pain to L calf. Pt on Eliquis hx DVT and PEs, pt endorses SOB

## 2022-08-09 ENCOUNTER — EMERGENCY (EMERGENCY)
Facility: HOSPITAL | Age: 47
LOS: 1 days | Discharge: HOME | End: 2022-08-09
Attending: STUDENT IN AN ORGANIZED HEALTH CARE EDUCATION/TRAINING PROGRAM | Admitting: STUDENT IN AN ORGANIZED HEALTH CARE EDUCATION/TRAINING PROGRAM

## 2022-08-09 VITALS
RESPIRATION RATE: 16 BRPM | SYSTOLIC BLOOD PRESSURE: 122 MMHG | HEIGHT: 70 IN | DIASTOLIC BLOOD PRESSURE: 101 MMHG | HEART RATE: 99 BPM | OXYGEN SATURATION: 100 % | TEMPERATURE: 97 F

## 2022-08-09 VITALS
SYSTOLIC BLOOD PRESSURE: 126 MMHG | OXYGEN SATURATION: 100 % | RESPIRATION RATE: 16 BRPM | TEMPERATURE: 98 F | DIASTOLIC BLOOD PRESSURE: 83 MMHG | HEART RATE: 77 BPM

## 2022-08-09 PROCEDURE — 99283 EMERGENCY DEPT VISIT LOW MDM: CPT

## 2022-08-09 RX ORDER — ACETAMINOPHEN 500 MG
975 TABLET ORAL ONCE
Refills: 0 | Status: COMPLETED | OUTPATIENT
Start: 2022-08-09 | End: 2022-08-09

## 2022-08-09 RX ORDER — IBUPROFEN 200 MG
600 TABLET ORAL ONCE
Refills: 0 | Status: COMPLETED | OUTPATIENT
Start: 2022-08-09 | End: 2022-08-09

## 2022-08-09 RX ADMIN — Medication 600 MILLIGRAM(S): at 17:58

## 2022-08-09 RX ADMIN — Medication 975 MILLIGRAM(S): at 17:59

## 2022-08-09 NOTE — ED ADULT TRIAGE NOTE - CHIEF COMPLAINT QUOTE
Pt c/o wound to R ankle. States wound has been present x 1 week, was placed on Bactrim & Clindamycin. States after starting antibiotics she developed rash around mouth. No improvement in wound. Denies fevers. C/o fatigue and headache, chills. PMHx DVT & PE

## 2022-08-09 NOTE — ED PROVIDER NOTE - NSFOLLOWUPCLINICS_GEN_ALL_ED_FT
[facility] - [place]  Dermatology  [address], [address]  [place] [address]  Phone: [phone]  Fax: [phone]

## 2022-08-09 NOTE — ED PROVIDER NOTE - CLINICAL SUMMARY MEDICAL DECISION MAKING FREE TEXT BOX
Naveen BAILON: 46F hx of prior LE and b/l PEs no longer on AC, unclear etiology,  presents with a cc of evaluation R ankle wound, reports has been there for years is getting bigger, is itchy and occasional has discharge, no fever, no rash thinks b/l LE are slightly more swollen was seen at urgent care for this recently, was rx'd bactrim and clinda, for which she stopped taking because she developed a rash on her face, noted triage note however pt denies oral involvement, no rash at this time, no fever, no creeping erythema but notes now is feeling diffusely unwell a/w headache. 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 Denies syncope, lightheadedness, dizziness. Denies chest palpitations, abdominal pain. exam vss non toxic PE as above ddx c/f skin lesions possibly 2/2 melanoma, do not suspect superimposed skin infection, dvt or PE at this time, will discuss with derm to facilitate expedited follow up,

## 2022-08-09 NOTE — ED PROVIDER NOTE - OBJECTIVE STATEMENT
Naveen BAIOLN: 46F hx of prior LE and b/l PEs no longer on AC, unclear etiology,  presents with a cc of evaluation R ankle wound, reports has been there for years is getting bigger, is itchy and occasional has discharge, no fever, no rash thinks b/l LE are slightly more swollen was seen at urgent care for this recently, was rx'd bactrim and clinda, for which she stopped taking because she developed a rash on her face, noted triage note however pt denies oral involvement, no rash at this time, no fever, no creeping erythema but notes now is feeling diffusely unwell a/w headache. 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 Denies syncope, lightheadedness, dizziness. Denies chest palpitations, abdominal pain. Denies edema. Denies dysuria, hematuria, BRBPR, tarry stools, diarrhea, constipation. Also notes slightly getting larger lesion to L medial calf. Naveen BAILON: 46F hx of prior LE and b/l PEs no longer on AC, unclear etiology,  presents with a cc of evaluation R ankle wound, reports has been there for years is getting bigger, is itchy and occasional has discharge, no fever, no rash thinks b/l LE are slightly more swollen was seen at urgent care for this recently, was rx'd bactrim and clinda, for which she stopped taking because she developed a rash on her face, noted triage note however pt denies oral involvement, no rash at this time, no fever, no creeping erythema but notes now is feeling diffusely unwell a/w headache. 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 Denies syncope, lightheadedness, dizziness. Denies chest palpitations, abdominal pain. Denies edema. Denies dysuria, hematuria, BRBPR, tarry stools, diarrhea, constipation. Also notes slightly getting larger lesion to L medial calf. Pt reports both lesions have been there for years, thinks are getting worse. She has not seen a dermatologist.

## 2022-08-09 NOTE — ED PROVIDER NOTE - PHYSICAL EXAMINATION
Naveen BAILON:  VITALS: Initial triage and subsequent vitals have been reviewed by me.  GEN APPEARANCE: WDWN, alert and cooperative, non-toxic appearing and in NAD  HEAD: Atraumatic, normocephalic   EYES: PERRLa, EOMI, vision grossly intact.   EARS: Gross hearing intact.   NOSE: No nasal discharge, no external evidence of epistaxis.   NECK: Supple  CV: RRR, S1S2, no c/r/m/g. No cyanosis. Extremities warm, well perfused. Cap refill <2 seconds. No bruits.   LUNGS: CTAB. No wheezing. No rales. No rhonchi. No diminished breath sounds.   ABDOMEN: Soft, NTND. No guarding or rebound. No masses.   MSK/EXT: Spine appears normal, no spine point tenderness. No CVA ttp. Normal muscular development. Pelvis stable. No obvious joint or bony deformity, no peripheral edema.   NEURO: Alert, follows commands. Weight bearing normal. Speech normal. Sensation and motor normal x4 extremities.   SKIN: large, 4x3 area of nevis like lesion to R medial ankle w ulceration and erosion in center area, no surrounding erythema, edema, cool to touch. Another small 1x1cm lesion to L medial calf w border irregularity, and color differences within lesion   PSYCH: Normal mood and affect.

## 2022-08-09 NOTE — ED ADULT NURSE NOTE - OBJECTIVE STATEMENT
Patient arrives to [facility] for r ankle wound for a couple years. A&Ox4. Patient reports she feels it is getting bigger and itchy. Patient reports she is currently on bactrim and clinda after being seen at urgent care for occasional discharge and because she felt her bilateral lower extremities appeared more swollen. HX of DVT to bilateral lower extremities. Patient reports she stopped taking the abx because she developed a red rash on her face. No race visible to face at this time. Patient reports headache and feelings of unwell but denies any nausea, vomiting, diarrhea, fever, or chills. Wound approximately 2.5in by 4in. Skin appears dry, scaley, and vega. Dr. Hodge at bedside for evaluation. Safety maintained. Patient stable upon exiting the room.

## 2022-08-09 NOTE — ED PROVIDER NOTE - PATIENT PORTAL LINK FT
You can access the FollowMyHealth Patient Portal offered by [facility] by registering at the following website: [email or web address]. By joining [employer]’s FollowMyHealth portal, you will also be able to view your health information using other applications (apps) compatible with our system.

## 2022-08-09 NOTE — ED PROVIDER NOTE - NSFOLLOWUPINSTRUCTIONS_ED_ALL_ED_FT
Thank you for visiting our Emergency Department, it has been a pleasure taking part in your healthcare.    Your discharge diagnosis is: skin lesions  Please take all discharge medications as indicated below:  Take Motrin/Tylenol for pain as needed, please follow instructions on manufacturers label. If you have any questions please consult a pharmacist or your PMD.  Please follow up with your PMD within x48 hours.  Please follow up with Dermatology witin x48 hours.  A list of providers for follow up has been given to you.  A copy of resulted labs, imaging, and findings have been provided to you.   You have had a detailed discussion with your provider regarding your diagnosis, care management and discharge planning including, but not limited to: return precautions, follow up visits with existing or new providers, new prescriptions and/or medication changes, wound and/or splint/cast care or other care   aspects specific to your diagnosis and treatment. You have been given the opportunity to have your questions answered. At this time you have been deemed stable and fit for discharge.  Return precautions to the Emergency Department include but are not limited to: unrelenting nausea, vomiting, fever, chills, chest pain, shortness of breath, dizziness, chest or abdominal pain, worsening back pain, syncope, blood in urine or stool, headache that doesn't resolve, numbness or tingling, loss of sensation, loss of motor function, or any other concerning symptoms.

## 2022-08-11 ENCOUNTER — APPOINTMENT (OUTPATIENT)
Dept: DERMATOLOGY | Facility: CLINIC | Age: 47
End: 2022-08-11

## 2022-08-11 VITALS — BODY MASS INDEX: 31.1 KG/M2 | HEIGHT: 69 IN | WEIGHT: 210 LBS

## 2022-08-11 PROCEDURE — 99204 OFFICE O/P NEW MOD 45 MIN: CPT

## 2022-09-08 ENCOUNTER — APPOINTMENT (OUTPATIENT)
Dept: DERMATOLOGY | Facility: CLINIC | Age: 47
End: 2022-09-08
Payer: COMMERCIAL

## 2022-09-08 DIAGNOSIS — L82.1 OTHER SEBORRHEIC KERATOSIS: ICD-10-CM

## 2022-09-08 DIAGNOSIS — L81.0 POSTINFLAMMATORY HYPERPIGMENTATION: ICD-10-CM

## 2022-09-08 PROCEDURE — 11900 INJECT SKIN LESIONS </W 7: CPT

## 2022-09-08 PROCEDURE — 99214 OFFICE O/P EST MOD 30 MIN: CPT | Mod: 25

## 2022-10-26 NOTE — ASSESSMENT
[FreeTextEntry1] : 1. Lichen simplex chronicus, ddx also includes hypertrophic lichen planus. Chronic, flaring\par - s/p ILK 40mg/ml x1 at \par - we have discussed the nature and course of this condition, treatment options and expectations.\par Intralesional injection of Kenalog, 10 mg/ml\par - A total of 0.25 ml was injected.\par - The risks/benefits/alternatives of intralesional steroid injections were reviewed with the patient which include but are not limited to pain, atrophy, and dispigmentation. Intralesional injections were performed in the affected area. The patient tolerated the procedure well.\par - START Mometasone 0.1% ointment to affected areas BID for 2 weeks, then take 1 week off, repeat PRN. Do not apply to face/groin. Do not use for more than 2 weeks at a time, with 1 week off in between. Side effects discussed with pt including but not limited to thinning of the skin, atrophy and dyspigmentation.\par \par 2. Seborrheic keratosis of left medial shin\par - Reassured patient about benign nature\par - [employer] smart sticker testing performed at  to look for genetic markers suggestive of melanoma due to pt concerns, but not enough sample collected. Study did not result. If lesion grows/changes, pt can f/u to try again.\par \par 3. Comedonal acne, mild, w/ 4. PIH\par - start tretinoin cream 0.025% 2-3x/week, increase to daily as tolerated; use pea sized amount all over face at night time; wear SPF 30+ and reapply every 2 hrs as needed; side effects discussed including dryness/flakiness of skin, skin peeling\par - start Azelaic acid to face qAM for PIH. GoodRx coupon provided if not covered by insurance.\par - pt counseled on importance of sun protection, SPF30 or higher\par \par \par RTC 1-2 months\par

## 2022-10-26 NOTE — PHYSICAL EXAM
[Alert] : alert [Oriented x 3] : ~L oriented x 3 [Well Nourished] : well nourished [Conjunctiva Non-injected] : conjunctiva non-injected [No Visual Lymphadenopathy] : no visual  lymphadenopathy [No Clubbing] : no clubbing [No Edema] : no edema [No Bromhidrosis] : no bromhidrosis [No Chromhidrosis] : no chromhidrosis [FreeTextEntry3] : Exam notable for:\par \par Stuck on velvety brown thin plaque with comedone line openings on dermoscopy, and uniform papule on medial edge, on left medial shin\par \par Thick lichenified plaque with central crusting on right medial ankle \par \par Closed comedones and hyperpigmented macules on face\par \par

## 2022-10-26 NOTE — HISTORY OF PRESENT ILLNESS
[FreeTextEntry1] : f/u- LSC, mole [de-identified] : Pt is a 46 year old F presenting for f/u of:\par \par #Itchy plaque on R medial ankle - LSC vs Hypertrophic LP\par - at [facility], had ILK injection. Itching improved immediately after injection but has returned. Also using clobetasol BID daily to ankle since LV.\par \par #SK on L medial shin - at [facility], given patient's concern and changing nature of lesion, [employer] smart sticker testing performed to look for genetic markers suggestive of melanoma\par \par #Comedonal Acne, PIH. Not using anything currently. Denies flares w period.

## 2022-11-15 ENCOUNTER — LABORATORY RESULT (OUTPATIENT)
Age: 47
End: 2022-11-15

## 2022-11-15 ENCOUNTER — NON-APPOINTMENT (OUTPATIENT)
Age: 47
End: 2022-11-15

## 2022-11-16 ENCOUNTER — APPOINTMENT (OUTPATIENT)
Dept: DERMATOLOGY | Facility: CLINIC | Age: 47
End: 2022-11-16
Payer: COMMERCIAL

## 2022-11-16 DIAGNOSIS — D48.7 NEOPLASM OF UNCERTAIN BEHAVIOR OF OTHER SPECIFIED SITES: ICD-10-CM

## 2022-11-16 DIAGNOSIS — L25.9 UNSPECIFIED CONTACT DERMATITIS, UNSPECIFIED CAUSE: ICD-10-CM

## 2022-11-16 PROCEDURE — 99214 OFFICE O/P EST MOD 30 MIN: CPT | Mod: 25

## 2022-11-16 PROCEDURE — 11102 TANGNTL BX SKIN SINGLE LES: CPT

## 2022-12-29 NOTE — ASSESSMENT
[FreeTextEntry1] : 1. Neoplasm of unknown biological significance, on the L medial shin-\par - DDX includes seborrheic keratosis. Bx to r/o melanoma. [employer] smart sticker testing performed at previous visit on 8/11/22  to look for genetic markers suggestive of melanoma due to pt concerns, but not enough sample collected. Study did not result. Biopsy was performed today for histologic correlation. Will contact Patient with results and plan treatment considering histologic findings.\par \par - Biopsy by shave\par The risks/benefits/alternatives of skin biopsy were explained to the Patient which include but are not limited to bleeding, infection, scarring or discoloration of skin, and lesion recurrence. \par Patient expressed understanding of these risks and provided consent to the procedure. \par Time-out with verification of the Patient and lesion site were performed. \par Site was prepped with rubbing alcohol, lidocaine was injected for anesthesia, and biopsy was performed. Specimen was sent to pathology. \par Procedure was without complication and well-tolerated. \par - Wound care was discussed.\par \par 2. Lichen simplex chronicus, ddx also includes hypertrophic lichen planus with 3. Prurigo nodularis; Chronic, flaring-\par - We have discussed the nature and course of this condition, treatment options and expectations.\par - Patient has had two previous injections of intralesional kenalog, both  0.25mL of 10 mg/mL and 0.25 mL of 40 mg/mL at previous visits, respectively. \par - Refractory pruritus suggests a chronic secondary pathology such as atopic dermatitis. Minimal relief with ILK 10mg/mL and 40 mg/mL as well as with Group 1 and Group 3 high potency corticosteroids indicates that a biologic may be warranted. Counseled extensively on Dupilumab.  \par - Dupi r/b/a discussed including but not limited to conjunctivitis, recurrent orolabial HSV, and injection site reaction.\par - START Opzelura 1.5% cream, apply sparingly to affected areas twice a day as needed for itching. Opzelura sent to [facility]. Pt knows that they will contact her and phone number was confirmed. \par -If Opzelura not approved, tacrolimus ointment may be substituted. \par - At Patient request, REFILL Clobetasol propionate 0.05% ointment, apply sparingly to very itchy areas on the legs and hands only 1-2 times a day for up to 2 weeks. Then STOP and take at least a 1 week break before starting again.Side effects discussed with pt including but not limited to thinning of the skin, atrophy and dyspigmentation, telangiectasias, striae. Pt understands risks. \par \par 3. Contact dermatitis, acute, flaring on the L neck-\par - Education, counseling.\par - Gentle skin care reviewed\par - Emphasized to use gentle, fragrance-free personal care products (including soap and laundry detergent). Avoid scrubbing/rubbing skin, no loofas or washcloths. Limit showers to once daily with lukewarm water\par - [place] use of bland emollients discussed. \par - Pt to START Opzelura 1.5% cream as above. \par \par 4. Comedonal acne, mild, improving w/ 4. PIH, improving with tretinoin and azelaic acid-\par - Education, counseling. \par - C/w tretinoin cream 0.025%, use pea sized amount all over face at night time; wear SPF 30+ and reapply every 2 hrs as needed; side effects discussed including dryness/flakiness of skin, skin peeling\par - C/w Azelaic acid to face qAM for PIH\par - pt counseled on importance of sun protection, SPF30 or higher. \par \par RTC 7-8 weeks or sooner as warranted.\par

## 2022-12-29 NOTE — HISTORY OF PRESENT ILLNESS
[FreeTextEntry1] : f/u- LSC, mole [de-identified] : Pt is a 46 year old F presenting for f/u of:\par \par 1. Itchy plaque on R medial ankle - LSC vs Hypertrophic LP\par - 2nd ILK injection at ; 0.25 mL of ILK 10mg/mL. Itching improved immediately after injection but returned a couple of days later. \par - Started Mometasone 0.1% ointment at . A little helpful for itching. Pt describes seeing discoloration at the ankle. \par - Previously used clobetasol propionate 0.05% oint BID, started at 8/11/22 visit.\par \par 2. Itchy rash on L lateral neck, started 2 weeks ago. Area initially was very itchy & inflamed and Pt describes "whiteheads" that emerged. \par - Denies new perfumes or moisturizers but "cycles" through multiple fragrances regularly.\par - No new nail polish. Reports allergy to acrylic nail polish. \par \par 3. SK on L medial shin x months, asymptomatic. Pt denies any changes since  but she remains concerned. \par At visit on 8/11/22m given patient's concern and changing nature of lesion, [employer] smart sticker testing performed to look for genetic markers suggestive of melanoma. Not enough sample resulted and study did not result. \par \par 4. Comedonal Acne, PIH, improved.  Started tretinoin 0.025% cream at  which she thinks has helped. Denies xerosis. Also using azelaic acid during the day.  Denies flares w period. 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

## 2022-12-29 NOTE — PHYSICAL EXAM
[Alert] : alert [Oriented x 3] : ~L oriented x 3 [Well Nourished] : well nourished [Conjunctiva Non-injected] : conjunctiva non-injected [No Visual Lymphadenopathy] : no visual  lymphadenopathy [No Clubbing] : no clubbing [No Edema] : no edema [No Bromhidrosis] : no bromhidrosis [No Chromhidrosis] : no chromhidrosis [FreeTextEntry3] : alert, oriented x 3, well nourished, conjunctiva non-injected, no visual lymphadenopathy, no clubbing, no edema, no bromhidrosis and no chromhidrosis.\par Focused physical exam of face, neck, chest, forearms, hands, lower legs, and feet performed today per Patient preference.\par \par Exam notable for:\par \par - hyperpigmented scaly patch at L lateral neck extending into the clavicle\par - Dark brown/grey hyperpigmented nodule at R lateral calf\par - linear pink hyperpigmented plaque at R distal shin. \par - Erosion R lower leg \par - 0.7 cm x 0.8 cm stuck on velvety brown thin plaque with comedone line openings on dermoscopy, and uniform papule on medial edge, on left medial shin\par - Thick lichenified plaque with central crusting and hypopigmentation at superior aspect on right medial ankle \par - fairly regular macules on the cheeks\par \par

## 2023-02-03 ENCOUNTER — APPOINTMENT (OUTPATIENT)
Dept: OBGYN | Facility: CLINIC | Age: 48
End: 2023-02-03
Payer: COMMERCIAL

## 2023-02-03 ENCOUNTER — ASOB RESULT (OUTPATIENT)
Age: 48
End: 2023-02-03

## 2023-02-03 ENCOUNTER — APPOINTMENT (OUTPATIENT)
Dept: OBGYN | Facility: CLINIC | Age: 48
End: 2023-02-03

## 2023-02-03 VITALS
HEIGHT: 69 IN | WEIGHT: 229 LBS | DIASTOLIC BLOOD PRESSURE: 88 MMHG | BODY MASS INDEX: 33.92 KG/M2 | SYSTOLIC BLOOD PRESSURE: 133 MMHG | HEART RATE: 80 BPM

## 2023-02-03 DIAGNOSIS — B00.9 HERPESVIRAL INFECTION, UNSPECIFIED: ICD-10-CM

## 2023-02-03 PROCEDURE — 99213 OFFICE O/P EST LOW 20 MIN: CPT | Mod: 25

## 2023-02-03 PROCEDURE — 99396 PREV VISIT EST AGE 40-64: CPT

## 2023-02-03 PROCEDURE — 76830 TRANSVAGINAL US NON-OB: CPT

## 2023-02-03 RX ORDER — VALACYCLOVIR 500 MG/1
500 TABLET, FILM COATED ORAL
Qty: 90 | Refills: 3 | Status: ACTIVE | COMMUNITY
Start: 2023-02-03 | End: 1900-01-01

## 2023-02-03 NOTE — DISCUSSION/SUMMARY
[FreeTextEntry1] : A/P Pt with nll Gyn exam but pt with symptomatic l uteri.  Pt in stable condition.\par \par Discussed various tx options for myoma which are limited due to hx of DVT/PE but pt wants NICOLLE.\par \par R/B/A of surgery discussed with pt and open vs laparoscopic also discussed.  Pt understands risks of surgery including bleeding, blood tx, DVT, PE, injury to bowel/bladder, nerve damage and even death.  Pt has verbalized understanding.\par \par Will:\par \par 1)  PAP, gc, chl, HPV done\par 2) Pelvic sono\par 3) Mammogram/Breast sono ordered for screening\par 4) Pt may need pre-op anticoagulation\par 5) Will call pt after sono\par 6) Valtrex as per pt request\par \par RIKI Ordonez

## 2023-02-03 NOTE — HISTORY OF PRESENT ILLNESS
[FreeTextEntry1] : 46 y/o  LMP 23 here for annual visit.  Pt with c/o of LLQ pain on and off about 2x/wk and the pain lasts for several hours but she is no incapacitated by it.  Pt is not sure of any aggravating or alleviating factors.  Pt is not sexually active currently.\par \par PT had UAE last year for her fibroids and pt states that nothing is changed and she still has heavy periods.\par \par PMH - L uter; HSV; ADHD; Hx of DVT and PE\par PSH - 3/2022 UAE; arm surgery\par POb -  x 2; SAB x 1; VTOP x 1\par Meds - Adderall\par SH - neg x 3\par FH - Maternal uncle with protate CA; Maternal female cousin with colon ca\par \par Last PAP  - WNL and neg HPV\par Last Mammo 2021 - WNL

## 2023-02-08 LAB — CYTOLOGY CVX/VAG DOC THIN PREP: NORMAL

## 2023-03-01 ENCOUNTER — APPOINTMENT (OUTPATIENT)
Dept: DERMATOLOGY | Facility: CLINIC | Age: 48
End: 2023-03-01
Payer: COMMERCIAL

## 2023-03-01 DIAGNOSIS — L03.019 CELLULITIS OF UNSPECIFIED FINGER: ICD-10-CM

## 2023-03-01 DIAGNOSIS — L28.1 PRURIGO NODULARIS: ICD-10-CM

## 2023-03-01 PROCEDURE — 99214 OFFICE O/P EST MOD 30 MIN: CPT

## 2023-03-01 RX ORDER — NYSTATIN AND TRIAMCINOLONE ACETONIDE 100000; 1 [USP'U]/G; MG/G
100000-0.1 OINTMENT TOPICAL
Qty: 1 | Refills: 3 | Status: ACTIVE | COMMUNITY
Start: 2023-03-01 | End: 1900-01-01

## 2023-03-01 NOTE — ASSESSMENT
[FreeTextEntry1] : acne refill topicals\par \par rash on legs - most c/w prurigo nodularis\par will start Dupixent given itching and recurrence\par also with likely atopic dermatitis on neck\par tried/failed opzelura and clobetasol\par \par paronychia\par mycolog

## 2023-03-01 NOTE — HISTORY OF PRESENT ILLNESS
[FreeTextEntry1] : rash [de-identified] : rash on legs worsening again and quite itchy\par neck also itchy\par all gets better with tx/clobetasol but gets worse again\par opzelura not helping\par \par cuticles better with moisturizing but still not normal

## 2023-03-01 NOTE — PHYSICAL EXAM
[FreeTextEntry3] : pericuticular swelling\par \par scaly lichenified plaques legs R>L\par \par neck hyperpigmented patches

## 2023-03-02 RX ORDER — DUPILUMAB 300 MG/2ML
300 INJECTION, SOLUTION SUBCUTANEOUS
Qty: 1 | Refills: 11 | Status: ACTIVE | COMMUNITY
Start: 2023-03-01 | End: 1900-01-01

## 2023-04-04 ENCOUNTER — APPOINTMENT (OUTPATIENT)
Dept: OBGYN | Facility: CLINIC | Age: 48
End: 2023-04-04
Payer: COMMERCIAL

## 2023-04-04 VITALS
HEART RATE: 103 BPM | WEIGHT: 229 LBS | BODY MASS INDEX: 33.92 KG/M2 | DIASTOLIC BLOOD PRESSURE: 55 MMHG | HEIGHT: 69 IN | SYSTOLIC BLOOD PRESSURE: 113 MMHG

## 2023-04-04 PROCEDURE — 99214 OFFICE O/P EST MOD 30 MIN: CPT

## 2023-04-04 RX ORDER — METRONIDAZOLE 7.5 MG/G
0.75 GEL VAGINAL
Qty: 1 | Refills: 0 | Status: DISCONTINUED | COMMUNITY
Start: 2021-08-09 | End: 2023-04-04

## 2023-04-08 NOTE — HISTORY OF PRESENT ILLNESS
[FreeTextEntry1] : "[place]"\par \par 48 yo P2 here for evaluation and management of HMB. \par Pt s/p UAE 3/2022 w/o significant improvement. \par Now w/ongoing HMB w/passage of clots.\par She also c/o urinary frequency, const, diarrhea.\par \par MRI 7/2021 shows diffuse adeno\par \par Pt w/h/o DVT left leg and PE 2001 (after travelling).\par \par \par "I think I have a fistula" - no work up.\par \par Pt referred by Dr. Ordonez for lsc.

## 2023-04-08 NOTE — PHYSICAL EXAM
[Normal] : normal [FreeTextEntry7] : soft, NT, ND [FreeTextEntry6] : Unable to palpate secondary to habitus

## 2023-04-08 NOTE — DISCUSSION/SUMMARY
[FreeTextEntry1] : 48 yo P2 w/fibroid and adeno.\par H/o UAE - no sig improvement.\par H/o PE and DVT after travel.\par \par \par Plan\par Schedule TLH (consider vNOTES?)\par Med clearance\par RTO for preop chat (TEB)\par    Will discuss questionable fistula\par \par Letter to Dr. Ordonez

## 2023-04-15 ENCOUNTER — EMERGENCY (EMERGENCY)
Facility: HOSPITAL | Age: 48
LOS: 1 days | Discharge: HOME | End: 2023-04-15
Attending: EMERGENCY MEDICINE | Admitting: STUDENT IN AN ORGANIZED HEALTH CARE EDUCATION/TRAINING PROGRAM
Payer: COMMERCIAL

## 2023-04-15 VITALS
RESPIRATION RATE: 18 BRPM | HEART RATE: 100 BPM | TEMPERATURE: 98 F | DIASTOLIC BLOOD PRESSURE: 88 MMHG | SYSTOLIC BLOOD PRESSURE: 136 MMHG | OXYGEN SATURATION: 100 %

## 2023-04-15 LAB
ALBUMIN SERPL ELPH-MCNC: 3.9 G/DL — SIGNIFICANT CHANGE UP (ref 3.3–5)
ALP SERPL-CCNC: 81 U/L — SIGNIFICANT CHANGE UP (ref 40–120)
ALT FLD-CCNC: 13 U/L — SIGNIFICANT CHANGE UP (ref 4–33)
ANION GAP SERPL CALC-SCNC: 13 MMOL/L — SIGNIFICANT CHANGE UP (ref 7–14)
AST SERPL-CCNC: 17 U/L — SIGNIFICANT CHANGE UP (ref 4–32)
B PERT DNA SPEC QL NAA+PROBE: SIGNIFICANT CHANGE UP
B PERT+PARAPERT DNA PNL SPEC NAA+PROBE: SIGNIFICANT CHANGE UP
BASOPHILS # BLD AUTO: 0.08 K/UL — SIGNIFICANT CHANGE UP (ref 0–0.2)
BASOPHILS NFR BLD AUTO: 1.1 % — SIGNIFICANT CHANGE UP (ref 0–2)
BILIRUB SERPL-MCNC: <0.2 MG/DL — SIGNIFICANT CHANGE UP (ref 0.2–1.2)
BORDETELLA PARAPERTUSSIS (RAPRVP): SIGNIFICANT CHANGE UP
BUN SERPL-MCNC: 10 MG/DL — SIGNIFICANT CHANGE UP (ref 7–23)
C PNEUM DNA SPEC QL NAA+PROBE: SIGNIFICANT CHANGE UP
CALCIUM SERPL-MCNC: 8.9 MG/DL — SIGNIFICANT CHANGE UP (ref 8.4–10.5)
CHLORIDE SERPL-SCNC: 106 MMOL/L — SIGNIFICANT CHANGE UP (ref 98–107)
CO2 SERPL-SCNC: 20 MMOL/L — LOW (ref 22–31)
CREAT SERPL-MCNC: 0.78 MG/DL — SIGNIFICANT CHANGE UP (ref 0.5–1.3)
EGFR: 95 ML/MIN/1.73M2 — SIGNIFICANT CHANGE UP
EOSINOPHIL # BLD AUTO: 0.33 K/UL — SIGNIFICANT CHANGE UP (ref 0–0.5)
EOSINOPHIL NFR BLD AUTO: 4.7 % — SIGNIFICANT CHANGE UP (ref 0–6)
FLUAV SUBTYP SPEC NAA+PROBE: SIGNIFICANT CHANGE UP
FLUBV RNA SPEC QL NAA+PROBE: SIGNIFICANT CHANGE UP
GLUCOSE SERPL-MCNC: 99 MG/DL — SIGNIFICANT CHANGE UP (ref 70–99)
HADV DNA SPEC QL NAA+PROBE: SIGNIFICANT CHANGE UP
HCG SERPL-ACNC: <5 MIU/ML — SIGNIFICANT CHANGE UP
HCOV 229E RNA SPEC QL NAA+PROBE: DETECTED
HCOV HKU1 RNA SPEC QL NAA+PROBE: SIGNIFICANT CHANGE UP
HCOV NL63 RNA SPEC QL NAA+PROBE: SIGNIFICANT CHANGE UP
HCOV OC43 RNA SPEC QL NAA+PROBE: SIGNIFICANT CHANGE UP
HCT VFR BLD CALC: 39.9 % — SIGNIFICANT CHANGE UP (ref 34.5–45)
HGB BLD-MCNC: 12.4 G/DL — SIGNIFICANT CHANGE UP (ref 11.5–15.5)
HMPV RNA SPEC QL NAA+PROBE: SIGNIFICANT CHANGE UP
HPIV1 RNA SPEC QL NAA+PROBE: SIGNIFICANT CHANGE UP
HPIV2 RNA SPEC QL NAA+PROBE: SIGNIFICANT CHANGE UP
HPIV3 RNA SPEC QL NAA+PROBE: SIGNIFICANT CHANGE UP
HPIV4 RNA SPEC QL NAA+PROBE: SIGNIFICANT CHANGE UP
IANC: 3.73 K/UL — SIGNIFICANT CHANGE UP (ref 1.8–7.4)
IMM GRANULOCYTES NFR BLD AUTO: 0.3 % — SIGNIFICANT CHANGE UP (ref 0–0.9)
LYMPHOCYTES # BLD AUTO: 2.07 K/UL — SIGNIFICANT CHANGE UP (ref 1–3.3)
LYMPHOCYTES # BLD AUTO: 29.6 % — SIGNIFICANT CHANGE UP (ref 13–44)
M PNEUMO DNA SPEC QL NAA+PROBE: SIGNIFICANT CHANGE UP
MCHC RBC-ENTMCNC: 26.9 PG — LOW (ref 27–34)
MCHC RBC-ENTMCNC: 31.1 GM/DL — LOW (ref 32–36)
MCV RBC AUTO: 86.6 FL — SIGNIFICANT CHANGE UP (ref 80–100)
MONOCYTES # BLD AUTO: 0.76 K/UL — SIGNIFICANT CHANGE UP (ref 0–0.9)
MONOCYTES NFR BLD AUTO: 10.9 % — SIGNIFICANT CHANGE UP (ref 2–14)
NEUTROPHILS # BLD AUTO: 3.73 K/UL — SIGNIFICANT CHANGE UP (ref 1.8–7.4)
NEUTROPHILS NFR BLD AUTO: 53.4 % — SIGNIFICANT CHANGE UP (ref 43–77)
NRBC # BLD: 0 /100 WBCS — SIGNIFICANT CHANGE UP (ref 0–0)
NRBC # FLD: 0 K/UL — SIGNIFICANT CHANGE UP (ref 0–0)
NT-PROBNP SERPL-SCNC: 31 PG/ML — SIGNIFICANT CHANGE UP
PLATELET # BLD AUTO: 423 K/UL — HIGH (ref 150–400)
POTASSIUM SERPL-MCNC: 4.5 MMOL/L — SIGNIFICANT CHANGE UP (ref 3.5–5.3)
POTASSIUM SERPL-SCNC: 4.5 MMOL/L — SIGNIFICANT CHANGE UP (ref 3.5–5.3)
PROT SERPL-MCNC: 7 G/DL — SIGNIFICANT CHANGE UP (ref 6–8.3)
RAPID RVP RESULT: DETECTED
RBC # BLD: 4.61 M/UL — SIGNIFICANT CHANGE UP (ref 3.8–5.2)
RBC # FLD: 13.2 % — SIGNIFICANT CHANGE UP (ref 10.3–14.5)
RSV RNA SPEC QL NAA+PROBE: SIGNIFICANT CHANGE UP
RV+EV RNA SPEC QL NAA+PROBE: SIGNIFICANT CHANGE UP
SARS-COV-2 RNA SPEC QL NAA+PROBE: SIGNIFICANT CHANGE UP
SODIUM SERPL-SCNC: 139 MMOL/L — SIGNIFICANT CHANGE UP (ref 135–145)
TROPONIN T, HIGH SENSITIVITY RESULT: <6 NG/L — SIGNIFICANT CHANGE UP
WBC # BLD: 6.99 K/UL — SIGNIFICANT CHANGE UP (ref 3.8–10.5)
WBC # FLD AUTO: 6.99 K/UL — SIGNIFICANT CHANGE UP (ref 3.8–10.5)

## 2023-04-15 PROCEDURE — 71046 X-RAY EXAM CHEST 2 VIEWS: CPT | Mod: 26

## 2023-04-15 PROCEDURE — 99223 1ST HOSP IP/OBS HIGH 75: CPT

## 2023-04-15 PROCEDURE — 71275 CT ANGIOGRAPHY CHEST: CPT | Mod: 26,MA

## 2023-04-15 PROCEDURE — 93010 ELECTROCARDIOGRAM REPORT: CPT

## 2023-04-15 RX ORDER — APIXABAN 2.5 MG/1
10 TABLET, FILM COATED ORAL ONCE
Refills: 0 | Status: COMPLETED | OUTPATIENT
Start: 2023-04-15 | End: 2023-04-15

## 2023-04-15 RX ADMIN — APIXABAN 10 MILLIGRAM(S): 2.5 TABLET, FILM COATED ORAL at 22:19

## 2023-04-15 NOTE — ED CDU PROVIDER INITIAL DAY NOTE - NSICDXPASTMEDICALHX_GEN_ALL_CORE_FT
PAST MEDICAL HISTORY:  DVT (deep venous thrombosis)     Pulmonary embolism      PAST MEDICAL HISTORY:  ADHD     DVT (deep venous thrombosis)     History of eczema     Pulmonary embolism

## 2023-04-15 NOTE — ED CDU PROVIDER INITIAL DAY NOTE - TOBACCO USE
Gastrointestinal Esophagogastroduodenoscopy (EGD) - Upper Exam Discharge Instructions    1. Call Dr. Lance Sampson at [phone] for any problems or questions. 2. Contact the doctor's office for follow up appointment as directed. 3. Medication may cause drowsiness for several hours, therefore, do not drive or operate machinery for remainder of the day. 4. No alcohol today. 5. Ordinarily, you may resume regular diet and activity after exam unless otherwise specified by your physician. 6. For mild soreness in your throat you may use Cepacol throat lozenges or warm salt-water gargles as needed. Gastrointestinal Colonoscopy/Flexible Sigmoidoscopy - Lower Exam Discharge Instructions  1. Because of air put into your colon during exam, you may experience some abdominal distension, relieved by the passage of gas, for several hours. 2. Contact your physician if you have any of the following:  a. Excessive amount of bleeding - large amount when having a bowel movement. Occasional specks of blood with bowel movement would not be unusual.  b. Severe abdominal pain  c. Fever or Chills  3. Polyp Removal - follow these additional instructions  a. Take Metamucil - 1 tablespoon in juice every morning for 3 days if needed; avoid constipation  b. No Aspirin, Advil, Aleve, Nuprin, Ibuprofen, or medications that contain these drugs for 2 weeks.   Any additional instructions:  Repeat colonoscopy in 5 years Never smoker

## 2023-04-15 NOTE — ED CDU PROVIDER INITIAL DAY NOTE - OBJECTIVE STATEMENT
47-year-old female with past medical history of DVT and PEs prescribed Eliquis ( patient has been noncompliant with medication for the past 6 months, transitioned herself to aspirin instead), adenomyosis, LMP 3/28/23 c/o 4 week history of nocturnal cough and 2 day history of increasing SOB worsened w/ movement. Denies fevers, chills, nausea, vomiting, dizziness, chest pain, abdominal pain, dysuria, hematuria.    CDU CARMELA Bowser Note-----  ED Provider HPI as above, reviewed.  Pt is a 48 yo female, PMH DVT/PEs, previously on Eliquis, which pt decided to self-discontinue 6 months ago and has only been taking aspirin; hx/o adenomyosis, presented to the ED c/o nocturnal cough x 4 weeks and 2 days of increasing dyspnea aggravated with activity/movement.  In the ED, VSS, pt afebrile, O2 sats @ 100% on room air.  WBC 6.99, Hb 12.4, platelets 423.  CMP: unremarkable.  Trop <6.  BHCG <5.  ProBNP 31.  RVP: 229E Coronavirus detected; COVID NotDetected; RVP otherwise negative.  CT angio chest PE protocol was performed: "....IMPRESSION: No acute pulmonary embolus of central, main, or lobar pulmonary arterial tree. Several linearly oriented pulmonary arterial filling defects, for example along the right sided pulmonary arterial tree, images 207-218 series 304, are likely reflective of interval evolution of chronic PE.  Segmental and subsegmental pulmonary arterial tree cannot be assessed on this study due to artifact.  No pneumonia. Given the reported respiratory symptoms, suggest echocardiogram to evaluate for potential pulmonary hypertension.  Cholelithiasis....".  Pt was restarted on Eliquis; 10 milligram dose given in the ED.  Pt was dispo'd to CDU for continued care plan:  Tele / O2 sat monitoring, Eliquis BID, echo, Tele Doc of Day evaluation, general observation care / monitoring. 47-year-old female with past medical history of DVT and PEs prescribed Eliquis ( patient has been noncompliant with medication for the past 6 months, transitioned herself to aspirin instead), adenomyosis, LMP 3/28/23 c/o 4 week history of nocturnal cough and 2 day history of increasing SOB worsened w/ movement. Denies fevers, chills, nausea, vomiting, dizziness, chest pain, abdominal pain, dysuria, hematuria.    CDU CARMELA Bowser Note-----  ED Provider HPI as above, reviewed.  Pt is a 46 yo female, PMH DVT/PEs, previously on Eliquis, which pt decided to self-discontinue 6 months ago and has only been taking aspirin; hx/o adenomyosis, ADHD, eczema, presented to the ED c/o nocturnal cough x 4 weeks and 2 days of increasing dyspnea aggravated with activity/movement.  In the ED, VSS, pt afebrile, O2 sats @ 100% on room air.  WBC 6.99, Hb 12.4, platelets 423.  CMP: unremarkable.  Trop <6.  BHCG <5.  ProBNP 31.  RVP: 229E Coronavirus detected; COVID NotDetected; RVP otherwise negative.  CT angio chest PE protocol was performed: "....IMPRESSION: No acute pulmonary embolus of central, main, or lobar pulmonary arterial tree. Several linearly oriented pulmonary arterial filling defects, for example along the right sided pulmonary arterial tree, images 207-218 series 304, are likely reflective of interval evolution of chronic PE.  Segmental and subsegmental pulmonary arterial tree cannot be assessed on this study due to artifact.  No pneumonia. Given the reported respiratory symptoms, suggest echocardiogram to evaluate for potential pulmonary hypertension.  Cholelithiasis....".  Pt was restarted on Eliquis; 10 milligram dose given in the ED.  Pt was dispo'd to CDU for continued care plan:  Tele / O2 sat monitoring, Eliquis BID, echo, Tele Doc of Day evaluation, general observation care / monitoring.

## 2023-04-15 NOTE — ED PROVIDER NOTE - OBJECTIVE STATEMENT
47-year-old female with past medical history of DVT and PEs prescribed Eliquis ( patient has been noncompliant with medication for the past 6 months, transitioned herself to aspirin instead), adenomyosis, LMP 3/28/23 c/o 4 week history of nocturnal cough and 2 day history of increasing SOB worsened w/ movement. Denies fevers, chills, nausea, vomiting, dizziness, chest pain, abdominal pain, dysuria, hematuria.

## 2023-04-15 NOTE — ED CDU PROVIDER INITIAL DAY NOTE - ATTENDING APP SHARED VISIT CONTRIBUTION OF CARE
48 yo female, PMH DVT/PEs, previously on Eliquis, which pt decided to self-discontinue 6 months ago and has only been taking aspirin; hx/o adenomyosis, ADHD, eczema, presented to the ED c/o nocturnal cough x 4 weeks and 2 days of increasing dyspnea aggravated with activity/movement.  In the ED, VSS, pt afebrile, O2 sats @ 100% on room air.  WBC 6.99, Hb 12.4, platelets 423.  CMP: unremarkable.  Trop <6.  BHCG <5.  ProBNP 31.  RVP: 229E Coronavirus detected; COVID NotDetected; RVP otherwise negative.  CT angio chest PE protocol was performed: "....IMPRESSION: No acute pulmonary embolus of central, main, or lobar pulmonary arterial tree. Several linearly oriented pulmonary arterial filling defects, for example along the right sided pulmonary arterial tree, images 207-218 series 304, are likely reflective of interval evolution of chronic PE.  Segmental and subsegmental pulmonary arterial tree cannot be assessed on this study due to artifact.  No pneumonia. Given the reported respiratory symptoms, suggest echocardiogram to evaluate for potential pulmonary hypertension.  Cholelithiasis....".  Pt was restarted on Eliquis; 10 milligram dose given in the ED.

## 2023-04-15 NOTE — ED ADULT NURSE NOTE - OBJECTIVE STATEMENT
pt A&ox4, coming to ED for cough and SOB. upon exam pt endorses midsternal chest pain. pt has pmh of PE but no longer taking anticoagulants. pt has nonproductive cough, denies pink or red mucus. pt denies fevers/chills at home. pt denies H/A, Dizziness, lightheadedness, and radiating chest pain. breathing is spontaneous and unlabored. sating 99% on RA. bilateral pedal and radial pulses palpable and strong. left ac 20g IV placed Labs drawn and sent as per ordered. Bed in lowest position, call bell within reach, all other safety and comfort measures provided. awaiting labs results and further orders.

## 2023-04-15 NOTE — ED PROVIDER NOTE - PROGRESS NOTE DETAILS
CARMELA Emerson: received sign out from Dr Sherman to follow up CTA, CTA showing "Impression: No acute pulmonary embolus of central, main, or lobar pulmonary arterial tree. Several linearly oriented pulmonary arterial filling defects, for example along the right sided pulmonary arterial tree, images 207-218   series 304, are likely reflective of interval evolution of chronic PE. Segmental and subsegmental pulmonary arterial tree cannot be assessed on this study due to artifact.  No pneumonia. Given the reported respiratory symptoms, suggest echocardiogram to evaluate for potential pulmonary hypertension." Results reviewed with patient, will restart Eliquis and place in CDU for cardiac monitoring, echo and Tele Doc.  Pt aware and agreeable with plan, pt accepted by CDU CARMELA Bowser. D/W Dr Hurt.

## 2023-04-15 NOTE — ED PROVIDER NOTE - PHYSICAL EXAMINATION
GENERAL: NAD  HEENT:  Atraumatic  CHEST/LUNG: Chest rise equal bilaterally  HEART: Regular rate and rhythm  ABDOMEN: Soft, Nontender, Nondistended  EXTREMITIES:  Extremities warm  PSYCH: A&Ox3  SKIN: No obvious rashes or lesions  NEUROLOGY: strength and sensation intact in all extremities. Ambulatory without difficulty.

## 2023-04-15 NOTE — ED PROVIDER NOTE - NSFOLLOWUPINSTRUCTIONS_ED_ALL_ED_FT
Shortness of breath    Shortness of breath (dyspnea) means you have trouble breathing and could indicate a medical problem. Causes include lung disease, heart disease, low amount of red blood cells (anemia), poor physical fitness, being overweight, smoking, etc. Your health care provider today may not be able to find a cause for your shortness of breath after your exam. In this case, it is important to have a follow-up exam with your primary care physician as instructed. If medicines were prescribed, take them as directed for the full length of time directed. Refrain from tobacco products.    SEEK IMMEDIATE MEDICAL CARE IF YOU HAVE ANY OF THE FOLLOWING SYMPTOMS: worsening shortness of breath, chest pain, back pain, abdominal pain, fever, coughing up blood, lightheadedness/dizziness.     The results of any blood tests and imaging studies completed during your visit today were discussed and explained to you and a copy provided with your discharge instructions. Please follow up with your primary care doctor within 48 hours.

## 2023-04-15 NOTE — ED PROVIDER NOTE - CLINICAL SUMMARY MEDICAL DECISION MAKING FREE TEXT BOX
47-year-old female with past medical history of DVT and PEs prescribed Eliquis ( patient has been noncompliant with medication for the past 6 months, transitioned herself to aspirin instead), adenomyosis, LMP 3/28/23 c/o 4 week history of nocturnal cough and 2 day history of increasing SOB worsened w/ movement. Denies fevers, chills, nausea, vomiting, dizziness, chest pain, abdominal pain, dysuria, hematuria. Will screen for ACS (troponin), anemia/electrolytes, and chest x ray to screen for cardiopulmonary pathology. BNP for CHF. Pt is non-compliant w/ anticoagulation, borderline tachy, SOB, concerning for pulmonary embolism. Will CT angio. Low concern for DVT at this time (no calf pain/TTP, no leg swelling).

## 2023-04-15 NOTE — ED CDU PROVIDER INITIAL DAY NOTE - DETAILS
Tele / O2 sat monitoring, Eliquis BID, echo, Tele Doc of Day evaluation, general observation care / monitoring.

## 2023-04-16 PROCEDURE — 99232 SBSQ HOSP IP/OBS MODERATE 35: CPT

## 2023-04-16 RX ORDER — ALBUTEROL 90 UG/1
3 AEROSOL, METERED ORAL
Qty: 0 | Refills: 0 | DISCHARGE

## 2023-04-16 RX ORDER — APIXABAN 2.5 MG/1
10 TABLET, FILM COATED ORAL EVERY 12 HOURS
Refills: 0 | Status: DISCONTINUED | OUTPATIENT
Start: 2023-04-16 | End: 2023-04-19

## 2023-04-16 RX ADMIN — APIXABAN 10 MILLIGRAM(S): 2.5 TABLET, FILM COATED ORAL at 18:41

## 2023-04-16 RX ADMIN — APIXABAN 10 MILLIGRAM(S): 2.5 TABLET, FILM COATED ORAL at 06:51

## 2023-04-16 NOTE — CONSULT NOTE ADULT - TIME BILLING
Patient seen and examined, agree with the above assessment and plan by MARGARITA Mathew.  CV stable  CTA findings c/w chronic PE  Symptoms likely due to viral URI (coronavirus +)  Check TTE  Dispo per ED

## 2023-04-16 NOTE — CONSULT NOTE ADULT - ASSESSMENT
ECHO:  Study Date: 7/27/2021  CONCLUSIONS:  1. Normal trileaflet aortic valve.  2. Normal left ventricular internal dimensions and wall thicknesses.  3. Normal left ventricular systolic function. No segmental wall motion abnormalities.EF 71%  4. Normal right ventricular size and function.    a/p     48 yo female, PMH DVT/PEs, previously on Eliquis, which pt decided to self-discontinue 6 months ago and has only been taking aspirin; hx/o adenomyosis, presented to the ED c/o nocturnal cough x 4 weeks and 2 days of increasing dyspnea aggravated with activity/movement.     #Chronic PE/DVT   -SIERRA/ cough likely in the setting of known PE   -cta chest noted  No acute pulmonary embolus of central, main, or lobar pulmonary arterial tree. Several linearly oriented pulmonary arterial filling defects are likely reflective of interval evolution of chronic PE.   -pt self dc ac  -continue eliquis as ordered  -check echo to eval LV/RV fx

## 2023-04-16 NOTE — ED ADULT NURSE REASSESSMENT NOTE - NS ED NURSE REASSESS COMMENT FT1
received pt in intake room 1, A+OX4, ambulatory female. VSS, denies chest pain and SOB, at this time. pt is resting comfortably in stretcher, has no needs at this time. pt is stable at this time, will continue to monitor.
Break RN: pt received to CDU bed 4, a&ox4, resting comfortably in bed. Pt denies cp, sob, n/v, headache, dizziness at this time. Breathing even, unlabored. Pt placed on cardiac monitor, NSR. Safety maintained.
pt provided breakfast, will continue to monitor.

## 2023-04-16 NOTE — CONSULT NOTE ADULT - SUBJECTIVE AND OBJECTIVE BOX
CARDIOLOGY CONSULT - Dr. Nix         HPI:  48 yo female, PMH DVT/PEs, previously on Eliquis, which pt decided to self-discontinue 6 months ago and has only been taking aspirin (also noncomplaint with ASA) ; hx/o adenomyosis, presented to the ED c/o nocturnal cough x 4 weeks and 2 days of increasing dyspnea aggravated with activity/movement.   reported outpatient Heme work up neg for Hypercoagulopathy . has not following up with cardiology or hem recently. Denies hx of cardiac disease/ reported hx of unprovoked DVT/PE. denies dizziness , LE edema, fever or chills. ROS otherwise negative      PAST MEDICAL & SURGICAL HISTORY:  Pulmonary embolism      DVT (deep venous thrombosis)      ADHD      History of eczema      No significant past surgical history              PREVIOUS DIAGNOSTIC TESTING:    ECHO:  Study Date: 7/27/2021  CONCLUSIONS:  1. Normal trileaflet aortic valve.  2. Normal left ventricular internal dimensions and wall thicknesses.  3. Normal left ventricular systolic function. No segmental wall motion abnormalities.EF 71%  4. Normal right ventricular size and function.    MEDICATIONS:  Home Medications:  Adderall 10 mg oral tablet: 1 orally Pt states medication was Rx&#x27;d as TID, but states she only takes med once or twice daily PRN (16 Apr 2023 03:29)  aspirin: (pt states she takes &quot;on and off&quot;) (16 Apr 2023 03:31)  dupixent: (unclear dosage); pt takes via injection every 15 days (16 Apr 2023 03:30)      MEDICATIONS  (STANDING):  apixaban 10 milliGRAM(s) Oral every 12 hours      FAMILY HISTORY:  No pertinent family history in first degree relatives        SOCIAL HISTORY:    [x ] Non-smoker  [ ] Smoker  [ ] Alcohol    Allergies    erythromycin (Hives)  Wellbutrin (Angioedema)  macrolide antibiotics (Other)    Intolerances    	    REVIEW OF SYSTEMS:  CONSTITUTIONAL: No fever, weight loss, or fatigue  EYES: No eye pain, visual disturbances, or discharge  ENMT:  No difficulty hearing, tinnitus, vertigo; No sinus or throat pain  NECK: No pain or stiffness  RESPIRATORY:  see hpi   CARDIOVASCULAR: No chest pain, palpitations, passing out, dizziness, or leg swelling  GASTROINTESTINAL: No abdominal or epigastric pain. No nausea, vomiting, or hematemesis; No diarrhea or constipation. No melena or hematochezia.  GENITOURINARY: No dysuria, frequency, hematuria, or incontinence  NEUROLOGICAL: No headaches, memory loss, loss of strength, numbness, or tremors  SKIN: No itching, burning, rashes, or lesions   	    [ x] All others negative	  [ ] Unable to obtain    PHYSICAL EXAM:  T(C): 36.9 (04-16-23 @ 08:37), Max: 36.9 (04-16-23 @ 04:57)  HR: 81 (04-16-23 @ 08:37) (77 - 100)  BP: 124/60 (04-16-23 @ 08:37) (119/71 - 151/76)  RR: 16 (04-16-23 @ 08:37) (16 - 18)  SpO2: 100% (04-16-23 @ 08:37) (98% - 100%)  Wt(kg): --  I&O's Summary      Appearance: Normal	  Psychiatry: A & O x 3, Mood & affect appropriate  HEENT:   Normal oral mucosa, PERRL, EOMI	  Lymphatic: No lymphadenopathy  Cardiovascular: Normal S1 S2,RRR, No JVD, No murmurs  Respiratory: Lungs clear to auscultation	  Gastrointestinal:  Soft, Non-tender, + BS	  Skin: No rashes, No ecchymoses, No cyanosis	  Neurologic: Non-focal  Extremities: Normal range of motion, No clubbing, cyanosis or edema  Vascular: Peripheral pulses palpable 2+ bilaterally    TELEMETRY: 	    ECG:  	NSr   RADIOLOGY:    < from: CT Angio Chest PE Protocol w/ IV Cont (04.15.23 @ 18:25) >  FINDINGS:    LUNGS AND AIRWAYS: Central airways are patent. No large focal   consolidation. Evaluation for small pulmonary nodules is limited by   respiratory motion.  PLEURA: No pleural effusion or pneumothorax.  MEDIASTINUM AND ROSCOE: Small volume nodes.  VESSELS: No acute pulmonary embolus of central, main, or lobar pulmonary   arterial tree. Several linearly oriented pulmonary arterial filling   defects, for example along the right sided pulmonary arterial tree,   images 207-218 series 304, are likely reflective of interval evolution of   chronic PE. Segmental and subsegmental pulmonary arterial tree cannot be   assessed on this study due to artifact. Main pulmonary artery caliber is   within normal limits. Normal caliber of the thoracic aorta. Bovine   configuration of the great vessels.  HEART: Heart size is qualitatively within normal limits. Trace   pericardial fluid.  CHEST WALL AND LOWER NECK: No chest wall hematoma. Visualized thyroid is   unremarkable.  VISUALIZED UPPER ABDOMEN: Cholelithiasis.  BONES: Degenerative changes.    IMPRESSION:    No acute pulmonary embolus of central, main, or lobar pulmonary arterial   tree. Several linearly oriented pulmonary arterial filling defects, for   example along the right sided pulmonary arterial tree, images 207-218   series 304, are likely reflective of interval evolution of chronic PE.   Segmental and subsegmental pulmonary arterial tree cannot be assessed on   this study due to artifact.    No pneumonia. Given the reported respiratory symptoms, suggest   echocardiogram to evaluate for potential pulmonary hypertension.    Cholelithiasis.    --- End of Report ---      < end of copied text >    OTHER: 	  	  LABS:	 	    CARDIAC MARKERS:  Troponin T, High Sensitivity Result: <6 ng/L (04-15 @ 16:00)                                  12.4   6.99  )-----------( 423      ( 15 Apr 2023 16:00 )             39.9     04-15    139  |  106  |  10  ----------------------------<  99  4.5   |  20<L>  |  0.78    Ca    8.9      15 Apr 2023 16:00    TPro  7.0  /  Alb  3.9  /  TBili  <0.2  /  DBili  x   /  AST  17  /  ALT  13  /  AlkPhos  81  04-15      proBNP:   Lipid Profile:   HgA1c:   TSH:

## 2023-04-17 VITALS
HEART RATE: 82 BPM | SYSTOLIC BLOOD PRESSURE: 107 MMHG | RESPIRATION RATE: 14 BRPM | OXYGEN SATURATION: 100 % | TEMPERATURE: 98 F | DIASTOLIC BLOOD PRESSURE: 82 MMHG

## 2023-04-17 PROCEDURE — 99238 HOSP IP/OBS DSCHRG MGMT 30/<: CPT

## 2023-04-17 PROCEDURE — 93306 TTE W/DOPPLER COMPLETE: CPT | Mod: 26

## 2023-04-17 RX ORDER — APIXABAN 2.5 MG/1
1 TABLET, FILM COATED ORAL
Qty: 60 | Refills: 0
Start: 2023-04-17 | End: 2023-05-16

## 2023-04-17 RX ADMIN — APIXABAN 10 MILLIGRAM(S): 2.5 TABLET, FILM COATED ORAL at 07:17

## 2023-04-17 NOTE — ED CDU PROVIDER DISPOSITION NOTE - PATIENT PORTAL LINK FT
You can access the FollowMyHealth Patient Portal offered by [facility] by registering at the following website: [email or web address]. By joining "[employer]’s FollowMyHealth portal, you will also be able to view your health information using other applications (apps) compatible with our system.

## 2023-04-17 NOTE — PROGRESS NOTE ADULT - ASSESSMENT
ECHO:  Study Date: 7/27/2021  CONCLUSIONS:  1. Normal trileaflet aortic valve.  2. Normal left ventricular internal dimensions and wall thicknesses.  3. Normal left ventricular systolic function. No segmental wall motion abnormalities.EF 71%  4. Normal right ventricular size and function.    a/p     46 yo female, PMH DVT/PEs, previously on Eliquis, which pt decided to self-discontinue 6 months ago and has only been taking aspirin; hx/o adenomyosis, presented to the ED c/o nocturnal cough x 4 weeks and 2 days of increasing dyspnea aggravated with activity/movement.     #Chronic PE/DVT   -SIERRA/ cough likely in the setting of known PE   -229E Coronavirus detected; RVP otherwise negative  -cta chest noted  No acute pulmonary embolus of central, main, or lobar pulmonary arterial tree. Several linearly oriented pulmonary arterial filling defects are likely reflective of interval evolution of chronic PE.   -pt self dc ac  -continue eliquis as ordered  -pending echo to eval LV/RV fx     dcp per cdu pending echo results

## 2023-04-17 NOTE — ED CDU PROVIDER DISPOSITION NOTE - CARE PROVIDER_API CALL
Goyo Nix (MD)  Cardiovascular Disease; Interventional Cardiology; Nuclear Cardiology  [address], [address]  [place] [address]  Phone: [phone]  Fax: [phone]  Follow Up Time:

## 2023-04-17 NOTE — ED CDU PROVIDER SUBSEQUENT DAY NOTE - PHYSICAL EXAMINATION
Vital signs reviewed.   CONSTITUTIONAL:  in no apparent distress. Non-toxic appearing.   HEAD: Normocephalic, atraumatic.  ENT: normal nose; no rhinorrhea;  NECK/LYMPH: Supple; non-tender  RESP: NAD  EXT/MS: moves all extremities;   SKIN: Normal for age and race  PSYCH: Normal mood; appropriate affect.
CONSTITUTIONAL:  Well appearing, awake, alert, oriented to person, place, time/situation and in no apparent distress.  Pt. is objectively comfortable appearing and verbalizing in full, clear, effortless sentences.  ENMT: NC/AT.  Airway patent.  Nasal mucosa clear.  Moist mucous membranes.  Neck supple.  EYES:  Clear OU.  CARDIAC:  Normal rate, regular rhythm.  Heart sounds S1 S2.  No murmurs, gallops, or rubs.  RESPIRATORY:  Breath sounds clear and equal bilaterally.  No wheezes, no rales, no rhonchi.  GASTROINTESTINAL:  Abdomen soft, non-distended, non-tender.  No rebound, no guarding.  NEUROLOGICAL:  Alert and oriented to person/place/time/situation.  No focal deficits; no tremors noted.   MUSCULOSKELETAL:  Range of motion is not limited.    SKIN:  Skin color unremarkable.  Skin warm, dry, and intact.    PSYCHIATRIC:  Alert and oriented to person/place/time/situation.  Mood and affect WNL.  No apparent risk to self or others.

## 2023-04-17 NOTE — ED CDU PROVIDER SUBSEQUENT DAY NOTE - ATTENDING APP SHARED VISIT CONTRIBUTION OF CARE
I have personally performed a face to face medical and diagnostic evaluation of the patient. I have discussed with and reviewed the Resident's and/or ACP's and/or Medical/PA/NP student's note and agree with the History, ROS, Physical Exam and MDM unless otherwise indicated. A brief summary of my personal evaluation and impression can be found below.     47y F w/ PMHx DVT/PEs, previously on Eliquis, which pt decided to self-discontinue 6 months ago; hx/o adenomyosis who presented to the ED c/o cough x 4 weeks and 2 days of increasing dyspnea on exertion.  In the ED, VSS, pt afebrile, O2 sats @ 100% on room air.  WBC 6.99, Hb 12.4, platelets 423.  CMP: unremarkable.  Trop <6.  BHCG <5.  ProBNP 31.  RVP: 229E Coronavirus detected; COVID NotDetected; RVP otherwise negative.  CT angio chest PE protocol was performed: "....IMPRESSION: No acute pulmonary embolus of central, main, or lobar pulmonary arterial tree. Several linearly oriented pulmonary arterial filling defects, for example along the right sided pulmonary arterial tree, images 207-218 series 304, are likely reflective of interval evolution of chronic PE.  Segmental and subsegmental pulmonary arterial tree cannot be assessed on this study due to artifact.  No pneumonia. Given the reported respiratory symptoms, suggest echocardiogram to evaluate for potential pulmonary hypertension.  Cholelithiasis....".  Pt was restarted on Eliquis; 10 milligram dose given in the ED.  Pt was dispo'd to CDU for continued care plan:  Tele / O2 sat monitoring, Eliquis BID, echo, Tele Doc of Day evaluation, general observation care / monitoring.    In the interim, pt evaluated by cardiology and recs appreciated. Echo results WNL    On exam: lungs CTAB, patient with symptoms improvement, still mild sob with exertion. Discussed CTA findings with patient. Patient re-started on Eliquis, extensive patient education on importance of continuing Eliquis medication. Patient verbalized understanding, remains hemodynamically stable, patient is medically stable for discharge.  Discussed strict return precautions and follow-up instructions. Patient is agreeable with plan, addressed all questions and concerns at this time.
I have personally performed a history and physical examination of the patient and discussed management with the RADHA as well as the patient.  I reviewed the RADHA's note and agree with the documented findings and plan of care.  I have authored and modified critical sections of the Provider Note, including but not limited to HPI, Physical Exam and MDM.    46 yo female, PMH DVT/PEs, previously on Eliquis, which pt decided to self-discontinue 6 months ago and has only been taking aspirin; hx/o adenomyosis, presented to the ED c/o nocturnal cough x 4 weeks and 2 days of increasing dyspnea aggravated with activity/movement.  In the ED, VSS, pt afebrile, O2 sats @ 100% on room air.  WBC 6.99, Hb 12.4, platelets 423.  CMP: unremarkable.  Trop <6.  BHCG <5.  ProBNP 31.  RVP: 229E Coronavirus detected; COVID NotDetected; RVP otherwise negative.  CT angio chest PE protocol was performed: "....IMPRESSION: No acute pulmonary embolus of central, main, or lobar pulmonary arterial tree. Several linearly oriented pulmonary arterial filling defects, for example along the right sided pulmonary arterial tree, images 207-218 series 304, are likely reflective of interval evolution of chronic PE.  Segmental and subsegmental pulmonary arterial tree cannot be assessed on this study due to artifact.  No pneumonia. Given the reported respiratory symptoms, suggest echocardiogram to evaluate for potential pulmonary hypertension.  Cholelithiasis....".  Pt was restarted on Eliquis; 10 milligram dose given in the ED. no acute overnight events since admission to the CDU.  Patient pending cardiology evaluation at bedside.  Mildly dyspneic on exertion however comfortable at rest.  Exam remains unremarkable at this time.  Will continue symptomatic control as needed.

## 2023-04-17 NOTE — PROGRESS NOTE ADULT - SUBJECTIVE AND OBJECTIVE BOX
CARDIOLOGY FOLLOW UP - Dr. Nix  DATE OF SERVICE: 4/17/23    CC no cp  mild bruno       REVIEW OF SYSTEMS:  CONSTITUTIONAL: No fever, weight loss, or fatigue  RESPIRATORY: No cough, wheezing, chills or hemoptysis; No Shortness of Breath  CARDIOVASCULAR: No chest pain, palpitations, passing out, dizziness, or leg swelling  GASTROINTESTINAL: No abdominal or epigastric pain. No nausea, vomiting, or hematemesis; No diarrhea or constipation. No melena or hematochezia.  VASCULAR: No edema     PHYSICAL EXAM:  T(C): 36.7 (04-17-23 @ 06:35), Max: 37 (04-16-23 @ 12:25)  HR: 83 (04-17-23 @ 06:35) (74 - 97)  BP: 132/79 (04-17-23 @ 06:35) (96/57 - 132/79)  RR: 17 (04-17-23 @ 06:35) (17 - 18)  SpO2: 100% (04-17-23 @ 06:35) (99% - 100%)  Wt(kg): --  I&O's Summary      Appearance: Normal	  Cardiovascular: Normal S1 S2,RRR, No JVD, No murmurs  Respiratory: Lungs clear to auscultation	  Gastrointestinal:  Soft, Non-tender, + BS	  Extremities: Normal range of motion, No clubbing, cyanosis or edema      Home Medications:  Adderall 10 mg oral tablet: 1 orally Pt states medication was Rx&#x27;d as TID, but states she only takes med once or twice daily PRN (16 Apr 2023 03:29)  aspirin: (pt states she takes &quot;on and off&quot;) (16 Apr 2023 03:31)  dupixent: (unclear dosage); pt takes via injection every 15 days (16 Apr 2023 03:30)      MEDICATIONS  (STANDING):  apixaban 10 milliGRAM(s) Oral every 12 hours      TELEMETRY: nsr	    ECG:  	  RADIOLOGY:   DIAGNOSTIC TESTING:  [ ] Echocardiogram:  [ ]  Catheterization:  [ ] Stress Test:    OTHER: 	    LABS:	 	    Troponin T, High Sensitivity Result: <6 ng/L (04-15 @ 16:00)                          12.4   6.99  )-----------( 423      ( 15 Apr 2023 16:00 )             39.9     04-15    139  |  106  |  10  ----------------------------<  99  4.5   |  20<L>  |  0.78    Ca    8.9      15 Apr 2023 16:00    TPro  7.0  /  Alb  3.9  /  TBili  <0.2  /  DBili  x   /  AST  17  /  ALT  13  /  AlkPhos  81  04-15

## 2023-04-17 NOTE — ED CDU PROVIDER DISPOSITION NOTE - CLINICAL COURSE
46 y/o female pmh DVT/PE previously on Eliquis (self discontinued x 6 months ago) c/o cough 4 weeks and worsening sob x 2 days. Pt had labs and CTA showing no acute PE but signs of chronic PE. Pt was seen and eval by mike Nix who rec CDU for echo and to restart Eliquis. Pt did well overnight with no acute events. Echo was negative for acute pathology. Pt is stable for dc, will send Eliquis to pharmacy.

## 2023-04-17 NOTE — ED CDU PROVIDER SUBSEQUENT DAY NOTE - CLINICAL SUMMARY MEDICAL DECISION MAKING FREE TEXT BOX
Pt is 46 yo female, PMH DVT/PEs, previously on Eliquis, which pt decided to self-discontinue 6 months ago; hx/o adenomyosis who presented to the ED c/o cough x 4 weeks and 2 days of increasing dyspnea on exertion.  In the ED, VSS, pt afebrile, O2 sats @ 100% on room air.  WBC 6.99, Hb 12.4, platelets 423.  CMP: unremarkable.  Trop <6.  BHCG <5.  ProBNP 31.  RVP: 229E Coronavirus detected; COVID NotDetected; RVP otherwise negative.  CT angio chest PE protocol was performed: "....IMPRESSION: No acute pulmonary embolus of central, main, or lobar pulmonary arterial tree. Several linearly oriented pulmonary arterial filling defects, for example along the right sided pulmonary arterial tree, images 207-218 series 304, are likely reflective of interval evolution of chronic PE.  Segmental and subsegmental pulmonary arterial tree cannot be assessed on this study due to artifact.  No pneumonia. Given the reported respiratory symptoms, suggest echocardiogram to evaluate for potential pulmonary hypertension.  Cholelithiasis....".  Pt was restarted on Eliquis; 10 milligram dose given in the ED.  Pt was dispo'd to CDU for continued care plan:  Tele / O2 sat monitoring, Eliquis BID, echo, Tele Doc of Day evaluation, general observation care / monitoring.  In the interim, pt evaluated by cardiology and recs appreciated. Pt pending Echo in AM. No acute events overnight.
Tele / O2 sat monitoring, Eliquis BID, echo, Tele Doc of Day evaluation, general observation care / monitoring.

## 2023-04-17 NOTE — ED CDU PROVIDER SUBSEQUENT DAY NOTE - NS ED ATTENDING STATEMENT MOD
This was a shared visit with the RADHA. I reviewed and verified the documentation and independently performed the documented:
This was a shared visit with the RADHA. I reviewed and verified the documentation and independently performed the documented:

## 2023-04-17 NOTE — ED CDU PROVIDER DISPOSITION NOTE - ATTENDING CONTRIBUTION TO CARE
I have personally performed a face to face medical and diagnostic evaluation of the patient. I have discussed with and reviewed the Resident's and/or ACP's and/or Medical/PA/NP student's note and agree with the History, ROS, Physical Exam and MDM unless otherwise indicated. A brief summary of my personal evaluation and impression can be found below.    Please see CDU Subsequent day note for further details.    Agree with above.    Discharge Note: Patient evaluated by CDU attending. Patient feels better. VSS Exam unremarkable. Labs reviewed. After CDU evaluation and observation, there is no further need for observation or acute in-patient hospital admission. Patient is medically stable for discharge, rx for Eliquis sent and had extensive conversation about taking medication. Patient has been given copies of all tests done in ED and CDU and advised to follow up with primary care physician as soon as possible. Patient advised to return to ED if symptoms worsen.

## 2023-04-17 NOTE — ED CDU PROVIDER SUBSEQUENT DAY NOTE - NSICDXPASTMEDICALHX_GEN_ALL_CORE_FT
PAST MEDICAL HISTORY:  ADHD     DVT (deep venous thrombosis)     History of eczema     Pulmonary embolism

## 2023-04-18 PROBLEM — Z87.2 PERSONAL HISTORY OF DISEASES OF THE SKIN AND SUBCUTANEOUS TISSUE: Chronic | Status: ACTIVE | Noted: 2023-04-16

## 2023-04-18 PROBLEM — F90.9 ATTENTION-DEFICIT HYPERACTIVITY DISORDER, UNSPECIFIED TYPE: Chronic | Status: ACTIVE | Noted: 2023-04-16

## 2023-04-25 ENCOUNTER — APPOINTMENT (OUTPATIENT)
Dept: PULMONOLOGY | Facility: CLINIC | Age: 48
End: 2023-04-25
Payer: COMMERCIAL

## 2023-04-25 VITALS
OXYGEN SATURATION: 98 % | WEIGHT: 229 LBS | DIASTOLIC BLOOD PRESSURE: 75 MMHG | RESPIRATION RATE: 15 BRPM | HEART RATE: 86 BPM | TEMPERATURE: 97 F | HEIGHT: 69 IN | BODY MASS INDEX: 33.92 KG/M2 | SYSTOLIC BLOOD PRESSURE: 116 MMHG

## 2023-04-25 PROCEDURE — 99205 OFFICE O/P NEW HI 60 MIN: CPT

## 2023-04-25 NOTE — HISTORY OF PRESENT ILLNESS
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  She had been experiencing lower extremity pain and swelling, in addition to angioedema.  She was seen at urgent care initially, was given an inhaler.  She traveled to [place] and was using her albuterol round-the-clock without improvement.  When she returns.  She was evaluated in the emergency room, was found to have DVT and multiple bilateral pulmonary emboli.  Patient was extensively worked up.  She has a family history of a father and paternal aunt who both had postoperative clots.  She had a CT of the abdomen which showed a uterine mass, fibroids.  She underwent a uterine embolization in March 2022 but said that it did not work.  She underwent heme evaluation with hypercoagulable work-up which was negative.  She herself had no other history of clots, 1 miscarriage years ago.  She was treated with Eliquis which she says she was on for about a year and a half.  She stopped it on her own in December 2022.  Patient again started noticing dyspnea with exertion.  She thought perhaps it was weight related, though then realized that she had been the same weight previously at times and did not feel short of breath.  She went back to [employer] last week, had a CTA which did not show acute PE but showed evidence of chronic pulmonary emboli.  Echo was normal.  Patient was restarted on Eliquis, completed the load and is now taking 5 mg twice a day.\par \par She still has dyspnea with exertion.  Particularly climbing trains stairs.  She is not having chest pain.  She is not having hemoptysis.\par She occasionally has swelling of her legs which she says is after eating a lot of salt.\par \par She is a lifelong non-smoker\par \par She is continually followed by GYN, and is scheduled for a hysterectomy for the early fall\par \par Patient also has been evaluated by dermatology ongoing for rash, pruritus.  Did not respond to the creams.  Was started on Dupixent in March.  She has done her fourth dose so far.  Skin still feels inflamed but less itchy.\par She denies known environmental allergies\par \par Other past medical history includes ADHD.  She had been placed on Wellbutrin, which turned out was the cause of the angioedema.  Currently on Adderall

## 2023-04-25 NOTE — DISCUSSION/SUMMARY
[FreeTextEntry1] : 47-year-old woman with chronic thromboembolic disease.\par First diagnosed with acute pulmonary emboli bilaterally and DVT in 2021, unprovoked.  Hypercoagulable work-up negative.  CT of the abdomen with and dominant mass, fibroids, unsuccessful uterine embolization, scheduled for upcoming hysterectomy in a few months, both father and paternal aunts with history of blood clots postop.  Patient was on Eliquis for about a year and a half.  Self discontinued in December.  Re-presented last week to [facility] with shortness of breath, CTA without acute findings but with evidence of chronic clots.  Restarted on anticoagulation with Eliquis.  Echo was normal.  Dyspnea with exertion.\par \par Patient is going to schedule a pulmonary function test.  I am adding a Feno as well, given the possible atopic dermatitis history, though now she is on Dupixent so may be altered.\par \par This time, she knows that she is on indefinite lifelong anticoagulation\par \par She has an upcoming appointment with hematology in the end of May\par

## 2023-05-12 ENCOUNTER — OUTPATIENT (OUTPATIENT)
Dept: OUTPATIENT SERVICES | Facility: HOSPITAL | Age: 48
LOS: 1 days | Discharge: HOME | End: 2023-05-12

## 2023-05-12 DIAGNOSIS — I26.99 OTHER PULMONARY EMBOLISM WITHOUT ACUTE COR PULMONALE: ICD-10-CM

## 2023-05-25 ENCOUNTER — APPOINTMENT (OUTPATIENT)
Dept: HEMATOLOGY ONCOLOGY | Facility: CLINIC | Age: 48
End: 2023-05-25
Payer: COMMERCIAL

## 2023-05-25 ENCOUNTER — LABORATORY RESULT (OUTPATIENT)
Age: 48
End: 2023-05-25

## 2023-05-25 ENCOUNTER — NON-APPOINTMENT (OUTPATIENT)
Age: 48
End: 2023-05-25

## 2023-05-25 ENCOUNTER — RESULT REVIEW (OUTPATIENT)
Age: 48
End: 2023-05-25

## 2023-05-25 ENCOUNTER — APPOINTMENT (OUTPATIENT)
Dept: PODIATRY | Facility: CLINIC | Age: 48
End: 2023-05-25
Payer: COMMERCIAL

## 2023-05-25 VITALS
RESPIRATION RATE: 16 BRPM | WEIGHT: 229.28 LBS | TEMPERATURE: 97.4 F | BODY MASS INDEX: 35.15 KG/M2 | OXYGEN SATURATION: 99 % | SYSTOLIC BLOOD PRESSURE: 125 MMHG | HEIGHT: 67.72 IN | DIASTOLIC BLOOD PRESSURE: 86 MMHG | HEART RATE: 84 BPM

## 2023-05-25 DIAGNOSIS — Z78.9 OTHER SPECIFIED HEALTH STATUS: ICD-10-CM

## 2023-05-25 DIAGNOSIS — Z83.49 FAMILY HISTORY OF OTHER ENDOCRINE, NUTRITIONAL AND METABOLIC DISEASES: ICD-10-CM

## 2023-05-25 DIAGNOSIS — Z82.49 FAMILY HISTORY OF ISCHEMIC HEART DISEASE AND OTHER DISEASES OF THE CIRCULATORY SYSTEM: ICD-10-CM

## 2023-05-25 DIAGNOSIS — Z83.3 FAMILY HISTORY OF DIABETES MELLITUS: ICD-10-CM

## 2023-05-25 LAB
BASOPHILS # BLD AUTO: 0.07 K/UL — SIGNIFICANT CHANGE UP (ref 0–0.2)
BASOPHILS NFR BLD AUTO: 1.3 % — SIGNIFICANT CHANGE UP (ref 0–2)
EOSINOPHIL # BLD AUTO: 0.17 K/UL — SIGNIFICANT CHANGE UP (ref 0–0.5)
EOSINOPHIL NFR BLD AUTO: 3.3 % — SIGNIFICANT CHANGE UP (ref 0–6)
HCT VFR BLD CALC: 39 % — SIGNIFICANT CHANGE UP (ref 34.5–45)
HGB BLD-MCNC: 12.4 G/DL — SIGNIFICANT CHANGE UP (ref 11.5–15.5)
IMM GRANULOCYTES NFR BLD AUTO: 0.4 % — SIGNIFICANT CHANGE UP (ref 0–0.9)
LYMPHOCYTES # BLD AUTO: 2.12 K/UL — SIGNIFICANT CHANGE UP (ref 1–3.3)
LYMPHOCYTES # BLD AUTO: 40.5 % — SIGNIFICANT CHANGE UP (ref 13–44)
MCHC RBC-ENTMCNC: 27.1 PG — SIGNIFICANT CHANGE UP (ref 27–34)
MCHC RBC-ENTMCNC: 31.8 G/DL — LOW (ref 32–36)
MCV RBC AUTO: 85.3 FL — SIGNIFICANT CHANGE UP (ref 80–100)
MONOCYTES # BLD AUTO: 0.24 K/UL — SIGNIFICANT CHANGE UP (ref 0–0.9)
MONOCYTES NFR BLD AUTO: 4.6 % — SIGNIFICANT CHANGE UP (ref 2–14)
NEUTROPHILS # BLD AUTO: 2.61 K/UL — SIGNIFICANT CHANGE UP (ref 1.8–7.4)
NEUTROPHILS NFR BLD AUTO: 49.9 % — SIGNIFICANT CHANGE UP (ref 43–77)
NRBC # BLD: 0 /100 WBCS — SIGNIFICANT CHANGE UP (ref 0–0)
PLATELET # BLD AUTO: 453 K/UL — HIGH (ref 150–400)
RBC # BLD: 4.57 M/UL — SIGNIFICANT CHANGE UP (ref 3.8–5.2)
RBC # FLD: 13.2 % — SIGNIFICANT CHANGE UP (ref 10.3–14.5)
WBC # BLD: 5.23 K/UL — SIGNIFICANT CHANGE UP (ref 3.8–10.5)
WBC # FLD AUTO: 5.23 K/UL — SIGNIFICANT CHANGE UP (ref 3.8–10.5)

## 2023-05-25 PROCEDURE — 11730 AVULSION NAIL PLATE SIMPLE 1: CPT | Mod: TA

## 2023-05-25 PROCEDURE — 99205 OFFICE O/P NEW HI 60 MIN: CPT

## 2023-05-25 PROCEDURE — 99203 OFFICE O/P NEW LOW 30 MIN: CPT | Mod: 25

## 2023-05-26 ENCOUNTER — APPOINTMENT (OUTPATIENT)
Dept: PULMONOLOGY | Facility: CLINIC | Age: 48
End: 2023-05-26
Payer: COMMERCIAL

## 2023-05-26 LAB
ALBUMIN SERPL ELPH-MCNC: 4.4 G/DL
ALP BLD-CCNC: 89 U/L
ALT SERPL-CCNC: 8 U/L
ANION GAP SERPL CALC-SCNC: 13 MMOL/L
AST SERPL-CCNC: 14 U/L
AT III PPP CHRO-ACNC: 137 %
BILIRUB SERPL-MCNC: 0.2 MG/DL
BUN SERPL-MCNC: 10 MG/DL
CALCIUM SERPL-MCNC: 9.7 MG/DL
CHLORIDE SERPL-SCNC: 104 MMOL/L
CO2 SERPL-SCNC: 23 MMOL/L
CREAT SERPL-MCNC: 0.82 MG/DL
EGFR: 90 ML/MIN/1.73M2
GLUCOSE SERPL-MCNC: 133 MG/DL
POTASSIUM SERPL-SCNC: 4 MMOL/L
PROT C PPP CHRO-ACNC: 152 %
PROT SERPL-MCNC: 7.5 G/DL
SODIUM SERPL-SCNC: 140 MMOL/L

## 2023-05-26 PROCEDURE — 94060 EVALUATION OF WHEEZING: CPT

## 2023-05-26 PROCEDURE — 94729 DIFFUSING CAPACITY: CPT

## 2023-05-26 PROCEDURE — 94726 PLETHYSMOGRAPHY LUNG VOLUMES: CPT

## 2023-05-26 PROCEDURE — 95012 NITRIC OXIDE EXP GAS DETER: CPT

## 2023-05-31 ENCOUNTER — NON-APPOINTMENT (OUTPATIENT)
Age: 48
End: 2023-05-31

## 2023-06-01 LAB
B2 GLYCOPROT1 AB SER QL: NEGATIVE
CARDIOLIPIN AB SER IA-ACNC: NEGATIVE
DNA PLOIDY SPEC FC-IMP: NORMAL
PROT S FREE PPP-ACNC: 100 %
PTR INTERP: NORMAL

## 2023-06-01 NOTE — ADDENDUM
[FreeTextEntry1] : Patient is scheduled for hysterectomy 6/30/23, recommend holding the Eliquis for 3 days prior to procedure. Can restart Eliquis 24 hrs after the procedure.

## 2023-06-01 NOTE — ASSESSMENT
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 Will repeat it today. Patient will need anticoagulation for life. \par \par 2- Endometrial Mass- scheduled for hysterectomy on 6/30/23. Needs hematology clearance. Patient will have to hold Eliquis 3 days prior to surgery, restart when medically stable after surgery. \par \par 3- ROSA: recommended Ferrous sulfate 325 mg pending results. \par \par Greater than 50% of the encounter time was spent on counseling and coordination of care for  PE/DVT    and I have spent  60   minutes of face to face time with the patient.\par \par \par RTC 6 months.

## 2023-06-01 NOTE — HISTORY OF PRESENT ILLNESS
[0 - No Distress] : Distress Level: 0 [90: Able to carry normal activity; minor signs or symptoms of disease.] : 90: Able to carry normal activity; minor signs or symptoms of disease.  [de-identified] : 48 yo F w/ sig PmHx p/w ~3 months of intermittent partial lip swelling, leg swelling, nonproductive cough, and SIERRA found to have DVT PE \par - pt found to have Acute above-the-knee left popliteal vein thrombus.\par - Multiple bilateral segmental and subsegmental pulmonary emboli. Ventricular septal flattening may represent right heart strain\par -ct a/p with *  Dominant pelvic mass centered on the endometrium, concerning for endometrial malignancy. Lesion appears to invade the myometrium. No evidence of parametrial spread. No evidence of metastatic disease or suspicious lymphadenopathy in the abdomen and pelvis.\par Patient had the first PE on 2021, was treated with Eliquis until 12/2022, when she self discontinue anticoagulation.  The most recurrent PE was on 4/2023. Reports family history in her paternal aunt, on long term coumadin. \par \par Endometrial Mass- scheduled for hysterectomy on 6/30/23. Needs hematology clearance. \par \par anemia \par - mild microcytic anemia \par -ferritin in mildly low range, can receive out pt IV iron post completion of menstrual period\par -start oral iron ferrous sulfate 325 mg once daily\par \par \par \par \par

## 2023-06-02 NOTE — PHYSICAL EXAM
[2+] : left foot dorsalis pedis 2+ [No Focal Deficits] : no focal deficits [Ankle Swelling (On Exam)] : not present [Varicose Veins Of Lower Extremities] : not present [FreeTextEntry3] : CFT: 3 seconds x 10.  Temperature gradient: warm to cool. [de-identified] : Mild bunion deformity present with normal ROM without any pain.  [FreeTextEntry1] : Light touch intact.

## 2023-06-02 NOTE — ASSESSMENT
[FreeTextEntry1] : Impression: Pain in left foot (M79.675).  Ingrown left hallux nail (L60.0).\par \par Treatment: I advised patient to tell the  not to push the cuticles back as that does predispose her to infections and ingrown nails.  \par The consent was signed with the patient for the planned surgical procedure.  The procedure was reviewed; discussed risks and benefits.  All questions answered.  \par Surgeon: Aaron Pizarro DPM\par Procedure: Partial nail avulsion.\par Location: Left hallux, lateral aspect. \par Report: \par After the digit was prepped with alcohol.  Local anesthesia was administered using 3cc’s of 0.5% Lidocaine, plain.  The digit was prepped with Betadine solution.  The symptomatic nail border was freed from its soft tissue attachment using a Boyd elevator and excised en toto using a straight nail splitter and a hemostat.  The surgical site was inspected for spicules and none were found.  \par The site was then flushed with Betadine.  The toe dressed with light compressive dressing and Betadine solution.  Bleeding was minimal and controlled by pressure.  \par \par Pathology: None sent.\par \par Patient tolerated the procedure and anesthesia well.  Written and oral postoperative instructions were given to the patient.  Remove the operative dressing in 24 hours after the procedure and begin warm water and Epsom salt soaks, daily.  Dry the foot well and then apply one drop of Betadine or a thin layer of antibiotic cream over the area and cover with a Band-Aid until follow up appointment.  \par \par Patient to return in 2 weeks.  \par

## 2023-06-02 NOTE — HISTORY OF PRESENT ILLNESS
[Sneakers] : juan ramon [FreeTextEntry1] : Patient presents today with a complaint of left hallux lateral proximal aspect of the nail pain.  She states that the symptoms started a few weeks ago, after she got a pedicure.  She reports that her  pushes the cuticle and cuts them.  She denies any redness, drainage or purulence, no fever or chills.

## 2023-06-21 ENCOUNTER — APPOINTMENT (OUTPATIENT)
Dept: OBGYN | Facility: CLINIC | Age: 48
End: 2023-06-21
Payer: COMMERCIAL

## 2023-06-21 ENCOUNTER — APPOINTMENT (OUTPATIENT)
Dept: PODIATRY | Facility: CLINIC | Age: 48
End: 2023-06-21
Payer: COMMERCIAL

## 2023-06-21 DIAGNOSIS — M79.675 PAIN IN LEFT TOE(S): ICD-10-CM

## 2023-06-21 DIAGNOSIS — L60.0 INGROWING NAIL: ICD-10-CM

## 2023-06-21 DIAGNOSIS — B35.3 TINEA PEDIS: ICD-10-CM

## 2023-06-21 PROCEDURE — 99213 OFFICE O/P EST LOW 20 MIN: CPT

## 2023-06-21 PROCEDURE — 99214 OFFICE O/P EST MOD 30 MIN: CPT | Mod: 95

## 2023-06-21 NOTE — HISTORY OF PRESENT ILLNESS
[FreeTextEntry1] : 48 yo P2 w/fibroid and adeno.\par H/o [place] - no sig improvement.\par H/o PE and DVT after travel.\par \par Pt s/p visit w/Heme - told that she should stop Elaquis 3 days prior to surgery and restart the following day.\par

## 2023-06-21 NOTE — DISCUSSION/SUMMARY
[FreeTextEntry1] : 46 yo P2 w/fibroid and adeno.\par H/o [place] - no sig improvement.\par H/o PE and DVT after travel.\par \par Pt s/p visit w/Heme - told that she should stop Elaquis 3 days prior to surgery and restart the following day.\par Multiple questions answered.\par \par Plan\par Keep plan for vNOTES TLH possible TLH.\par Spoke to pt about expectation for 2-3 wk recovery.\par \par

## 2023-06-22 ENCOUNTER — LABORATORY RESULT (OUTPATIENT)
Age: 48
End: 2023-06-22

## 2023-06-22 ENCOUNTER — OUTPATIENT (OUTPATIENT)
Dept: OUTPATIENT SERVICES | Facility: HOSPITAL | Age: 48
LOS: 1 days | End: 2023-06-22

## 2023-06-22 ENCOUNTER — APPOINTMENT (OUTPATIENT)
Dept: GASTROENTEROLOGY | Facility: CLINIC | Age: 48
End: 2023-06-22
Payer: COMMERCIAL

## 2023-06-22 VITALS
HEART RATE: 89 BPM | OXYGEN SATURATION: 98 % | WEIGHT: 232 LBS | DIASTOLIC BLOOD PRESSURE: 80 MMHG | SYSTOLIC BLOOD PRESSURE: 129 MMHG | BODY MASS INDEX: 35.57 KG/M2

## 2023-06-22 VITALS
RESPIRATION RATE: 14 BRPM | WEIGHT: 233.91 LBS | DIASTOLIC BLOOD PRESSURE: 83 MMHG | OXYGEN SATURATION: 98 % | SYSTOLIC BLOOD PRESSURE: 127 MMHG | HEART RATE: 86 BPM | HEIGHT: 68 IN | TEMPERATURE: 97 F

## 2023-06-22 DIAGNOSIS — Z12.11 ENCOUNTER FOR SCREENING FOR MALIGNANT NEOPLASM OF COLON: ICD-10-CM

## 2023-06-22 DIAGNOSIS — D25.9 LEIOMYOMA OF UTERUS, UNSPECIFIED: ICD-10-CM

## 2023-06-22 DIAGNOSIS — K58.9 IRRITABLE BOWEL SYNDROME W/OUT DIARRHEA: ICD-10-CM

## 2023-06-22 DIAGNOSIS — Z98.890 OTHER SPECIFIED POSTPROCEDURAL STATES: Chronic | ICD-10-CM

## 2023-06-22 LAB
A1C WITH ESTIMATED AVERAGE GLUCOSE RESULT: 5.9 % — HIGH (ref 4–5.6)
ANION GAP SERPL CALC-SCNC: 19 MMOL/L — HIGH (ref 7–14)
BLD GP AB SCN SERPL QL: NEGATIVE — SIGNIFICANT CHANGE UP
BUN SERPL-MCNC: 8 MG/DL — SIGNIFICANT CHANGE UP (ref 7–23)
CALCIUM SERPL-MCNC: 8.9 MG/DL — SIGNIFICANT CHANGE UP (ref 8.4–10.5)
CHLORIDE SERPL-SCNC: 98 MMOL/L — SIGNIFICANT CHANGE UP (ref 98–107)
CO2 SERPL-SCNC: 20 MMOL/L — LOW (ref 22–31)
CREAT SERPL-MCNC: 0.7 MG/DL — SIGNIFICANT CHANGE UP (ref 0.5–1.3)
EGFR: 109 ML/MIN/1.73M2 — SIGNIFICANT CHANGE UP
ESTIMATED AVERAGE GLUCOSE: 123 — SIGNIFICANT CHANGE UP
GLUCOSE SERPL-MCNC: 86 MG/DL — SIGNIFICANT CHANGE UP (ref 70–99)
HCG UR QL: NEGATIVE — SIGNIFICANT CHANGE UP
HCT VFR BLD CALC: 40 % — SIGNIFICANT CHANGE UP (ref 34.5–45)
HGB BLD-MCNC: 12.5 G/DL — SIGNIFICANT CHANGE UP (ref 11.5–15.5)
MCHC RBC-ENTMCNC: 26.1 PG — LOW (ref 27–34)
MCHC RBC-ENTMCNC: 31.3 GM/DL — LOW (ref 32–36)
MCV RBC AUTO: 83.5 FL — SIGNIFICANT CHANGE UP (ref 80–100)
NRBC # BLD: 0 /100 WBCS — SIGNIFICANT CHANGE UP (ref 0–0)
NRBC # FLD: 0 K/UL — SIGNIFICANT CHANGE UP (ref 0–0)
PLATELET # BLD AUTO: 475 K/UL — HIGH (ref 150–400)
POTASSIUM SERPL-MCNC: 4 MMOL/L — SIGNIFICANT CHANGE UP (ref 3.5–5.3)
POTASSIUM SERPL-SCNC: 4 MMOL/L — SIGNIFICANT CHANGE UP (ref 3.5–5.3)
RBC # BLD: 4.79 M/UL — SIGNIFICANT CHANGE UP (ref 3.8–5.2)
RBC # FLD: 13.2 % — SIGNIFICANT CHANGE UP (ref 10.3–14.5)
RH IG SCN BLD-IMP: POSITIVE — SIGNIFICANT CHANGE UP
SODIUM SERPL-SCNC: 137 MMOL/L — SIGNIFICANT CHANGE UP (ref 135–145)
WBC # BLD: 6.22 K/UL — SIGNIFICANT CHANGE UP (ref 3.8–10.5)
WBC # FLD AUTO: 6.22 K/UL — SIGNIFICANT CHANGE UP (ref 3.8–10.5)

## 2023-06-22 PROCEDURE — 99214 OFFICE O/P EST MOD 30 MIN: CPT

## 2023-06-22 RX ORDER — ASPIRIN/CALCIUM CARB/MAGNESIUM 324 MG
0 TABLET ORAL
Refills: 0 | DISCHARGE

## 2023-06-22 RX ORDER — SODIUM CHLORIDE 9 MG/ML
1000 INJECTION, SOLUTION INTRAVENOUS
Refills: 0 | Status: DISCONTINUED | OUTPATIENT
Start: 2023-06-30 | End: 2023-07-14

## 2023-06-22 RX ORDER — DUPILUMAB 300 MG/2ML
0 INJECTION, SOLUTION SUBCUTANEOUS
Refills: 0 | DISCHARGE

## 2023-06-22 RX ORDER — DEXTROAMPHETAMINE SACCHARATE, AMPHETAMINE ASPARTATE, DEXTROAMPHETAMINE SULFATE AND AMPHETAMINE SULFATE 1.875; 1.875; 1.875; 1.875 MG/1; MG/1; MG/1; MG/1
1 TABLET ORAL
Refills: 0 | DISCHARGE

## 2023-06-22 NOTE — H&P PST ADULT - NEGATIVE ENMT SYMPTOMS
no hearing difficulty/no nasal discharge/no abnormal taste sensation/no gum bleeding/no dry mouth/no throat pain/no dysphagia

## 2023-06-22 NOTE — H&P PST ADULT - NSICDXPASTMEDICALHX_GEN_ALL_CORE_FT
PAST MEDICAL HISTORY:  ADHD     DVT (deep venous thrombosis)     History of eczema     Pulmonary embolism      PAST MEDICAL HISTORY:  ADHD     DVT (deep venous thrombosis)     History of eczema     IBS (irritable bowel syndrome)     Obese     Pulmonary embolism

## 2023-06-22 NOTE — H&P PST ADULT - NSANTHOSAYNRD_GEN_A_CORE
No. CHRISTAL screening performed.  STOP BANG Legend: 0-2 = LOW Risk; 3-4 = INTERMEDIATE Risk; 5-8 = HIGH Risk

## 2023-06-22 NOTE — ASSESSMENT
[FreeTextEntry1] : The patient mentioned feeling BLOATED\par \par  A low FODMAP diet was discussed with the patient at length. The patient had multiple questions all of which were answered. I recommended a nutritionist. Also recommended that the patient keep a food diary. We discussed  options such as Vegetables. Fresh fruits. Dairy that is lactose-free, and hard cheeses, or ripened/matured cheeses including... Beef, pork, chicken, fish, eggs. Avoid breadcrumbs, marinades, and sauces/gravies that may be high in FODMAPs. Soy products including tofu, tempeh. Grains.\par \par The Patient mentioned being GASEOUS \par We discussed Probiotics and limiting leafy vegetables and other changes\par The patient mentioned some GERD\par We discussed a low acid , high protien diet .\par The patient said their is occasional CONSTIPATION \par We discussed the proper use of fiber and water intake \par \par Patient will need a screening colonoscopy after her hysterectomy. She is scheduled for hysterectomy next week\par \par I spent 45 minutes with the patient as well as reviewing documents prior to and after the office visit\par \par Colonoscopy procedure ordered The risks benefits alternatives and complications of the procedure/s were explained to the patient at length. The patient was agreeable and we will proceed. Preparation for procedure discussed reviewed side effects and adverse reactions. \par Patient needs pulmonary clearance prior to procedure \par \par

## 2023-06-22 NOTE — H&P PST ADULT - PROBLEM SELECTOR PLAN 1
Pt scheduled for total laparoscopic hysterectomy on 6/30/2023.  labs done results pending ekg in chart.  urine cup provided.  Chlorhexidine provided-  written and verbal instructions given, with teach back, pt able to verbalize understanding.  Preop teaching done, pt able to verbalize understanding.    medication day of procedure-  pepcid   PE on Eliquis as per hematology pt to hold 3 days prior to the procedure, last dose 6/26/2023.    pst request  echo 2023 in chart  hematology and pulmonary notes in chart

## 2023-06-22 NOTE — HISTORY OF PRESENT ILLNESS
[FreeTextEntry1] : 46 yo F w/ sig PmHx found to have DVT PE \par - pt found to have Acute above-the-knee left popliteal vein thrombus.\par - Multiple bilateral segmental and subsegmental pulmonary emboli. Ventricular septal flattening may represent right heart strain\par -ct a/p with * Dominant pelvic mass centered on the endometrium, concerning for endometrial malignancy. Lesion appears to invade the myometrium. No evidence of parametrial spread. No evidence of metastatic disease or suspicious lymphadenopathy in the abdomen and pelvis.\par Patient had the first PE on 2021, was treated with Eliquis until 12/2022, when she self discontinue anticoagulation. The most recurrent PE was on 4/2023. Reports family history in her paternal aunt, on long term coumadin. \par \par Endometrial Mass- scheduled for hysterectomy on 6/30/23.  \par \par hx of anemia \par - mild microcytic anemia \par -ferritin in mildly low range, can receive out pt IV iron post completion of menstrual period\par -start oral iron ferrous sulfate 325 mg once daily\par \par \par Patient complains of intermittent constipation bouts ongoing for over 1 year \par Patient has a hx of blood clotts in left upper thigh and b/l PE July in 2021 \par Patient is on Eliquis daily \par

## 2023-06-22 NOTE — H&P PST ADULT - GASTROINTESTINAL
details… normal/soft/nontender/nondistended/normal active bowel sounds/no guarding/no rigidity/no organomegaly/no palpable joana/no masses palpable

## 2023-06-22 NOTE — H&P PST ADULT - HISTORY OF PRESENT ILLNESS
46y/o female scheduled for total laparoscopic hysterectomy on 6/30/2023.  Pt states, "hx adenomyosis, fibroid hx of heavy prolonged bleeding, underwent uterine embolization 3/2022.  Continues to have heavy prolonged menstruation.  Hx of recurrent dvt/PE 2021 Eliquis stopped on 12/2022, developed sob 4/2023 was found to have bilateral PE placed on Eliquis.  Was evaluated by hematology unknown cause of dvt."    48y/o female scheduled for total laparoscopic hysterectomy on 6/30/2023.  Pt states, "hx adenomyosis, fibroid hx of heavy prolonged bleeding, underwent uterine embolization 3/2022.  Continues to have heavy prolonged menstruation.  Hx of recurrent dvt/PE 2021 placed on Eliquis stopped on 12/2022, developed sob 4/2023 was found to have bilateral PE placed on Eliquis.  Was evaluated by hematology unknown cause of dvt."

## 2023-06-22 NOTE — H&P PST ADULT - BIRTH SEX
Please see below  Per pt, [employer] is stating they never received the FMLA forms that were faxed to them on 1/3/22  Pt states they are asking for forms to be re signed and faxed again, and now dated for this year  Forms were initially dated for 12/31/21, when originally signed by provider    Forms printed and placed on provider desk for review     Female

## 2023-06-23 ENCOUNTER — NON-APPOINTMENT (OUTPATIENT)
Age: 48
End: 2023-06-23

## 2023-06-26 ENCOUNTER — NON-APPOINTMENT (OUTPATIENT)
Age: 48
End: 2023-06-26

## 2023-06-26 PROBLEM — M79.675 PAIN OF TOE OF LEFT FOOT: Status: ACTIVE | Noted: 2023-05-30

## 2023-06-26 PROBLEM — L60.0 INGROWN NAIL: Status: ACTIVE | Noted: 2023-05-30

## 2023-06-26 LAB
BARLEY IGE QN: <0.1 KUA/L
CHERRY IGE QN: <0.1 KUA/L
COW MILK IGE QN: 0.16 KUA/L
CRAB IGE QN: <0.1 KUA/L
DEPRECATED BARLEY IGE RAST QL: 0
DEPRECATED CHERRY IGE RAST QL: 0
DEPRECATED COW MILK IGE RAST QL: NORMAL
DEPRECATED CRAB IGE RAST QL: 0
DEPRECATED EGG WHITE IGE RAST QL: 0
DEPRECATED OAT IGE RAST QL: 0
DEPRECATED PEANUT IGE RAST QL: 0
DEPRECATED RYE IGE RAST QL: 0
DEPRECATED SOYBEAN IGE RAST QL: 0
DEPRECATED WHEAT IGE RAST QL: 0
EGG WHITE IGE QN: <0.1 KUA/L
OAT IGE QN: <0.1 KUA/L
PEANUT IGE QN: <0.1 KUA/L
RYE IGE QN: <0.1 KUA/L
SOYBEAN IGE QN: <0.1 KUA/L
TOTAL IGE SMQN RAST: 186 KU/L
WHEAT IGE QN: <0.1 KUA/L

## 2023-06-27 NOTE — HISTORY OF PRESENT ILLNESS
[FreeTextEntry1] : Patient returns today after left lateral hallux partial nail avulsion for paronychia.  Patient states that she no longer has pain in that corner.  She has followed post care instructions; reports no redness or drainage.  She stopped getting her cuticles pushed at the .  For the past few days, patient has noticed itching and peeling of the skin on her left hallux.  She denies any similar symptoms elsewhere on her feet.  She has not tried any interventions for this problem.

## 2023-06-27 NOTE — PHYSICAL EXAM
[2+] : left foot dorsalis pedis 2+ [No Focal Deficits] : no focal deficits [Ankle Swelling (On Exam)] : not present [Varicose Veins Of Lower Extremities] : not present [FreeTextEntry3] : CFT: 3 seconds x 10.  Temperature gradient: warm to cool. [de-identified] : Mild bunion deformity present with normal ROM without any pain.  [FreeTextEntry1] : Light touch intact.

## 2023-06-27 NOTE — ASSESSMENT
[FreeTextEntry1] : Impression: Pain in left foot (M79.675).  Ingrown left hallux nail (L60.0), resolved.  Tinea pedis, left (B35.3)\par \par Treatment: Discussed etiology and treatment options. \par Advised patient to clean fomites including the bathtub.  She does not walk barefoot in public places.  I prescribed topical Lotrimin for her to be used for 2 - 3 weeks.  Return to the office if not improving. \par The left hallux has healed well after intervention.  Advised patient to monitor as the nail grows out.  Continue to avoid pushing back of cuticles.  \par

## 2023-06-28 ENCOUNTER — NON-APPOINTMENT (OUTPATIENT)
Age: 48
End: 2023-06-28

## 2023-06-29 ENCOUNTER — TRANSCRIPTION ENCOUNTER (OUTPATIENT)
Age: 48
End: 2023-06-29

## 2023-06-29 ENCOUNTER — NON-APPOINTMENT (OUTPATIENT)
Age: 48
End: 2023-06-29

## 2023-06-29 LAB
CELIAC DISEASE INTERPRETATION: NORMAL
CELIAC GENE PAIRS PRESENT: YES
DQ ALPHA 1: NORMAL
DQ BETA 1: NORMAL
IMMUNOGLOBULIN A (IGA): 205 MG/DL

## 2023-06-29 NOTE — ASU PATIENT PROFILE, ADULT - PATIENT KNOW
Anesthesia Pre Eval Note    Anesthesia ROS/Med Hx    Overall Review:  EKG was reviewed       Pulmonary Review:    Positive for sleep apnea   The patient is a former smoker.     Neuro/Psych Review:    Positive for psychiatric history - Depression    Cardiovascular Review:    Positive for hypertension    GI/HEPATIC/RENAL Review:    Positive for GERD    End/Other Review:  Positive for diabetes - type 2  Positive for obesity super morbid - >50  Positive for arthritis  Additional Results:  EKG:  No results found for this or any previous visit (from the past 4464 hour(s)).    ALLERGIES:  No Known Allergies       Lab Results       Component                Value               Date                       WBC                      9.4                 06/22/2021                 WBC                      6.3                 06/30/2016                 RBC                      4.85                06/22/2021                 RBC                      5.01                06/30/2016                 HGB                      12.1 (L)            06/22/2021                 HGB                      15.3                06/30/2016                 HCT                      41.0                06/22/2021                 HCT                      45.9                06/30/2016                 MCHC                     29.5 (L)            06/22/2021                 MCHC                     33.3                06/30/2016                 SODIUM                   140                 06/22/2021                 SODIUM                   138                 02/19/2019                 POTASSIUM                4.2                 06/22/2021                 POTASSIUM                4.0                 02/19/2019                 CHLORIDE                 108 (H)             06/22/2021                 CHLORIDE                 102                 02/19/2019                 CO2                      27                  06/22/2021                 CO2                       32                  02/19/2019                 GLUCOSE                  107 (H)             06/22/2021                 GLUCOSE                  91                  02/19/2019                 BUN                      15                  06/22/2021                 BUN                      17                  02/19/2019                 CREATININE               0.92                06/22/2021                 CREATININE               0.91                02/19/2019                 GFRESTIMATE              >90                 06/22/2021                 GFRA                     >90                 02/19/2019                 GFRNA                    >90                 02/19/2019                 CALCIUM                  9.0                 06/22/2021                 CALCIUM                  9.2                 02/19/2019                 PLT                      349                 06/22/2021                 PLT                      240                 06/30/2016             Past Medical History:  No date: Anxiety  No date: Arthritis  02/22/2021: Colon polyp      Comment:  dr amado, tubular adenoma and hyperplastic polyp recall                1year  No date: Depressive disorder  No date: Erectile dysfunction  2003: Gastro-oesophageal reflux disease  No date: Hydrocele, left  No date: Hypertension  No date: Seasonal allergies  2007: Sleep apnea      Comment:  10/2020: severe, AHI 77, desat to 79% cpap    Past Surgical History:  2003: Colonoscopy  02/22/2021: Colonoscopy w/ polypectomy      Comment:  dr amado       Prior to Admission medications :  Medication nitrofurantoin, macrocrystal-monohydrate, (MACROBID) 100 MG capsule, Sig Take 1 capsule by mouth 2 times daily for 7 days., Start Date 8/4/21, End Date 8/11/21, Taking? , Authorizing Provider Harjinder Benton MD    Medication metFORMIN (GLUCOPHAGE-XR) 500 MG 24 hr tablet, Sig TAKE 1 TABLET BY MOUTH ONCE DAILY FOR 1 WEEK AND THEN   INCREASE TO 2 TABLETS DAILY WITH MEALS,  Start Date 7/12/21, End Date , Taking? , Authorizing Provider Jennifer Eubanks MD    Medication fluticasone (FLONASE) 50 MCG/ACT nasal spray, Sig Spray 2 sprays in each nostril daily., Start Date 6/22/21, End Date , Taking? , Authorizing Provider Jennifer Eubanks MD    Medication omeprazole (PrilOSEC) 20 MG capsule, Sig Take 1 capsule by mouth daily., Start Date 6/22/21, End Date , Taking? , Authorizing Provider Jennifer Eubanks MD    Medication ALPRAZolam (XANAX) 0.5 MG tablet, Sig Take 1 tablet by mouth 2 times daily as needed for Anxiety., Start Date 6/22/21, End Date , Taking? , Authorizing Provider Jennifer Eubanks MD    Medication citalopram (CeleXA) 20 MG tablet, Sig Take 1 tablet by mouth daily., Start Date 6/22/21, End Date , Taking? , Authorizing Provider Jennifer Eubanks MD    Medication ibuprofen (MOTRIN) 800 MG tablet, Sig Take 1 tablet by mouth every 8 hours as needed for Pain., Start Date 6/22/21, End Date , Taking? , Authorizing Provider Jennifer Eubanks MD    Medication furosemide (LASIX) 20 MG tablet, Sig Take 2 tablets by mouth daily., Start Date 6/22/21, End Date , Taking? , Authorizing Provider Jennifer Eubanks MD    Medication losartan (COZAAR) 50 MG tablet, Sig Take 1 tablet by mouth daily., Start Date 6/22/21, End Date , Taking? , Authorizing Provider Jennifer Eubanks MD    Medication potassium chloride (K-TAB) 20 MEQ ER tablet, Sig Take 10 mEq by mouth daily., Start Date 1/20/21, End Date , Taking? , Authorizing Provider Jennifer Eubanks MD    Medication lisinopril (ZESTRIL) 10 MG tablet, Sig Take 1 tablet by mouth daily., Start Date 1/20/21, End Date , Taking? , Authorizing Provider Jennifer Eubanks MD    Medication Elastic Bandages & Supports (MEDICAL COMPRESSION STOCKINGS) Misc, Sig 2 each daily. Put on in am, off at night, 15-20 mm Hg, Start Date 6/15/20, End Date , Taking? , Authorizing Provider Jennifer Eubanks MD    Medication tadalafil (CIALIS) 5 MG tablet, Sig Take 1  tablet by mouth daily., Start Date 5/29/18, End Date , Taking? , Authorizing Provider Jennifer Eubanks MD            Relevant Problems   Anesthesia Problems   (+) Severe obstructive sleep apnea      Other   (+) Anxiety   (+) Depressive disorder   (+) Essential hypertension   (+) GERD (gastroesophageal reflux disease)   (+) Hydrocele, left   (+) Morbid obesity (CMS/HCC)   (+) Peripheral edema       Physical Exam     Airway   Mallampati: III  TM Distance: >3 FB  Neck ROM: Full  Neck: Thick  TMJ Mobility: Good    Cardiovascular    Cardio Rhythm: Regular  Cardio Rate: Normal    General Assessment  General Assessment: Alert and oriented and No acute distress    Dental Exam  Dental exam normal    Pulmonary Exam    Breath sounds clear to auscultation:  Yes  Patient Demonstrates:  Non-labored Breathing    Abdominal Exam    Patient Demonstrates:  Obese      Anesthesia Plan:  Anesthesia Plan    Phone call attempted:   Case discussed with Patient or Representative    ASA Status: 3    Anesthesia Type: General    Induction: Intravenous  Preferred Airway Type: Mask  Maintenance: Inhalational  Premedication: None      Post-op Pain Management: Per Surgeon      Checklist  Reviewed: Lab Results, EKG, Patient Summary, Beta Blocker Status, NPO Status, Allergies, Medications, Problem list, Past Med History and Care Everywhere  Consent/Risks Discussed Statement:  The proposed anesthetic plan, including its risks and benefits, have been discussed with the Patient along with the risks and benefits of alternatives. Questions were encouraged and answered and the patient and/or representative understands and agrees to proceed.    I have discussed elements of the patient's history or examination, as noted above and/or as follows, that place the patient at higher risk of complications; BMI> 30 (obesity) and sleep apnea.    I discussed with the patient (and/or patient's legal representative) the risks and benefits of the proposed anesthesia  plan, General, which may include services performed by other anesthesia providers.    Alternative anesthesia plans, if available, were reviewed with the patient (and/or patient's legal representative). Discussion has been held with the patient (and/or patient's legal representative) regarding risks of anesthesia, which include allergic reaction, aspiration, dental injury, depressed breathing, eye injury, hypotension, intra-operative awareness, nausea, oral injury, sore throat, vomiting, nerve injury and organ damage and emergent situations that may require change in anesthesia plan.    The patient (and/or patient's legal representative) has indicated understanding, his/her questions have been answered, and he/she wishes to proceed with the planned anesthetic.    Blood Products: Not Anticipated     yes

## 2023-06-29 NOTE — ASU PATIENT PROFILE, ADULT - NSICDXPASTMEDICALHX_GEN_ALL_CORE_FT
PAST MEDICAL HISTORY:  ADHD     DVT (deep venous thrombosis)     History of eczema     IBS (irritable bowel syndrome)     Obese     Pulmonary embolism

## 2023-06-30 ENCOUNTER — TRANSCRIPTION ENCOUNTER (OUTPATIENT)
Age: 48
End: 2023-06-30

## 2023-06-30 ENCOUNTER — OUTPATIENT (OUTPATIENT)
Dept: OUTPATIENT SERVICES | Facility: HOSPITAL | Age: 48
LOS: 1 days | Discharge: HOME | End: 2023-06-30
Payer: COMMERCIAL

## 2023-06-30 ENCOUNTER — APPOINTMENT (OUTPATIENT)
Dept: OBGYN | Facility: HOSPITAL | Age: 48
End: 2023-06-30

## 2023-06-30 VITALS
RESPIRATION RATE: 16 BRPM | HEART RATE: 84 BPM | DIASTOLIC BLOOD PRESSURE: 91 MMHG | SYSTOLIC BLOOD PRESSURE: 149 MMHG | OXYGEN SATURATION: 97 % | TEMPERATURE: 98 F

## 2023-06-30 VITALS
DIASTOLIC BLOOD PRESSURE: 74 MMHG | RESPIRATION RATE: 18 BRPM | TEMPERATURE: 97 F | HEIGHT: 68 IN | OXYGEN SATURATION: 98 % | HEART RATE: 88 BPM | WEIGHT: 233.91 LBS | SYSTOLIC BLOOD PRESSURE: 118 MMHG

## 2023-06-30 DIAGNOSIS — D25.9 LEIOMYOMA OF UTERUS, UNSPECIFIED: ICD-10-CM

## 2023-06-30 DIAGNOSIS — Z98.890 OTHER SPECIFIED POSTPROCEDURAL STATES: Chronic | ICD-10-CM

## 2023-06-30 LAB
GLUCOSE BLDC GLUCOMTR-MCNC: 141 MG/DL — HIGH (ref 70–99)
HCG UR QL: NEGATIVE — SIGNIFICANT CHANGE UP

## 2023-06-30 PROCEDURE — 88307 TISSUE EXAM BY PATHOLOGIST: CPT | Mod: 26

## 2023-06-30 PROCEDURE — 58544 LSH W/T/O UTERUS ABOVE 250 G: CPT

## 2023-06-30 RX ORDER — SODIUM CHLORIDE 9 MG/ML
1000 INJECTION, SOLUTION INTRAVENOUS
Refills: 0 | Status: DISCONTINUED | OUTPATIENT
Start: 2023-06-30 | End: 2023-07-14

## 2023-06-30 RX ORDER — FENTANYL CITRATE 50 UG/ML
50 INJECTION INTRAVENOUS
Refills: 0 | Status: DISCONTINUED | OUTPATIENT
Start: 2023-06-30 | End: 2023-06-30

## 2023-06-30 RX ORDER — OXYCODONE HYDROCHLORIDE 5 MG/1
1 TABLET ORAL
Qty: 8 | Refills: 0
Start: 2023-06-30

## 2023-06-30 RX ORDER — ONDANSETRON 8 MG/1
4 TABLET, FILM COATED ORAL ONCE
Refills: 0 | Status: DISCONTINUED | OUTPATIENT
Start: 2023-06-30 | End: 2023-07-14

## 2023-06-30 RX ADMIN — FENTANYL CITRATE 50 MICROGRAM(S): 50 INJECTION INTRAVENOUS at 13:17

## 2023-06-30 RX ADMIN — FENTANYL CITRATE 50 MICROGRAM(S): 50 INJECTION INTRAVENOUS at 13:02

## 2023-06-30 NOTE — ASU DISCHARGE PLAN (ADULT/PEDIATRIC) - ASU DC SPECIAL INSTRUCTIONSFT
Postoperative Instructions      Pain control     For pain control, take the followin. Motrin 600mg four times a day, take with food  2. Add Tylenol 975 four times a day, alternated with motrin  3. Add oxycodone as needed for severe pain not managed well by motrin and tylenol. A prescription has been sent to your pharmacy on file.     Take Motrin and Tylenol every 6 hours for the first 3 days, afterwards take them as needed. Motrin and Tylenol can be obtained over the counter.        Restart your Eliquis tomorrow ().    Postoperative restrictions   Do not drive or make important decisions for 24 hours after anesthesia. You may feel drowsy for 24 hours and should have a responsible adult with you during that time. Nothing in the vagina (tampons, sexual intercourse), No tub baths, pools or hot tubs for 8 weeks (showers are ok!). No lifting anything heavier than 15 lbs, no strenuous exercise for 8 weeks after surgery. Do not pull or cut any stitches that you see around your incision.         Vaginal bleeding   Spotting and intermittent passage of blood clots per vagina is normal in first few weeks after surgery. If you are soaking 1 pad per hour, that is not normal and you should notify Dr. Neri's office and seek medical attention right away.      Signs of Infection  Some postoperative pain and discomfort is normal. However, if you experience any of the following, you may be developing an infection and should be seen by your doctor: pain that does not get better with the oral medications listed above, fever greater than 100.4F, foul smelling discharge coming from the surgical site, nausea and vomiting that does not stop (especially if you are unable to tolerate oral intake), or inability to urinate. If you experience any of these symptoms, call your doctor's office to be seen right away.    Follow Up  Call Dr. Neri's office to schedule a postoperative appointment in 2 weeks. The results from the procedure will be discussed with you at that time.

## 2023-06-30 NOTE — BRIEF OPERATIVE NOTE - OPERATION/FINDINGS
EUA: grossly normal external genitalia. Bulky 14w ut with nonpalpable bl adnexa. Nodularity felt near posterior cervix but no abnormal lesions seen on speculum exam. Cervix with ectropion on posterior lip.  Lsc: Entry site atraumatic. Upper abdomen grossly normal. Bl adnexa grossly normal. Sigmoid epiploica adherent to posterior wall of uterus. Anterior wall of uterus with adhesions to anterior abdominal wall in the midline. No other evidence of disease. Uterus approx 14 wk, bulky and globular. EUA: grossly normal external genitalia. Bulky 14w ut with nonpalpable bl adnexa. Nodularity felt near posterior cervix but no abnormal lesions seen on speculum exam. Cervix with ectropion on posterior lip.  Lsc: Entry site atraumatic. Upper abdomen grossly normal. Bl adnexa grossly normal. Sigmoid epiploica adherent to posterior wall of uterus. Anterior wall of uterus with adhesions to anterior abdominal wall in the midline. No other evidence of disease. Uterus approx 14 wk, bulky and globular. Appendix was grossly normal. Omentum grossly normal.

## 2023-06-30 NOTE — BRIEF OPERATIVE NOTE - NSICDXBRIEFPROCEDURE_GEN_ALL_CORE_FT
PROCEDURES:  Laparoscopic supracervical hysterectomy with salpingectomy and cystoscopy 30-Jun-2023 11:30:57  Arpita Brito  Lysis of adhesions, pelvic 30-Jun-2023 11:31:15  Arpita Brito

## 2023-06-30 NOTE — ASU DISCHARGE PLAN (ADULT/PEDIATRIC) - NS MD DC FALL RISK RISK
For information on Fall & Injury Prevention, visit: [email or web address] OR  [email or web address] OR  https://www.cdc.gov/steadi/patient.html

## 2023-06-30 NOTE — BRIEF OPERATIVE NOTE - NSICDXBRIEFPOSTOP_GEN_ALL_CORE_FT
POST-OP DIAGNOSIS:  Abnormal uterine bleeding 30-Jun-2023 11:31:43  Arpita Brito  Pelvic pain 30-Jun-2023 11:31:39  Arpita Brito

## 2023-06-30 NOTE — CHART NOTE - NSCHARTNOTEFT_GEN_A_CORE
Patient seen and examined at bedside, recently post-op. No acute complaints at this time. Denies CP, SOB, N/V, fevers, and chills.    Vital Signs Last 24 Hours  T(C): 36.3 (06-30-23 @ 13:00), Max: 36.5 (06-30-23 @ 11:15)  HR: 74 (06-30-23 @ 14:30) (69 - 90)  BP: 125/84 (06-30-23 @ 14:30) (108/58 - 140/81)  RR: 14 (06-30-23 @ 14:30) (12 - 19)  SpO2: 98% (06-30-23 @ 14:30) (95% - 100%)    I&O's Summary    30 Jun 2023 07:01  -  30 Jun 2023 14:42  --------------------------------------------------------  IN: 470 mL / OUT: 0 mL / NET: 470 mL        Physical Exam:  General: NAD  CV: NR, RR, S1, S2, no M/R/G  Lungs: CTA-B  Abdomen: Soft, appropriately tender, non-distended, +BS  Incision: LSC incisions CDI  Ext: No pain or swelling        MEDICATIONS  (STANDING):  lactated ringers. 1000 milliLiter(s) (30 mL/Hr) IV Continuous <Continuous>  lactated ringers. 1000 milliLiter(s) (125 mL/Hr) IV Continuous <Continuous>    MEDICATIONS  (PRN):  fentaNYL    Injectable 50 MICROGram(s) IV Push every 10 minutes PRN Severe Pain (7 - 10)  ondansetron Injectable 4 milliGRAM(s) IV Push once PRN Nausea and/or Vomiting      46 yo s/p Lsc Supracervical Hysterectomy, salpingectomy, cystoscopy, w/ KELLI recovering well in acute post-operative state.    Neuro: Pain controlled. PO pain meds   CV: Hemodynamically stable  Pulm: Encourage oob/ambulation, incentive spirometer at bedside  GI: Regular Diet   : Not yet voiding spontaneously, DTV@7  Heme: SCDs for DVT PPX  ID: afebrile  Endo: no active issues  Dispo: continue routine post-op care     CARMELA Ortiz  d/w GYN team

## 2023-06-30 NOTE — BRIEF OPERATIVE NOTE - NSICDXBRIEFPREOP_GEN_ALL_CORE_FT
PRE-OP DIAGNOSIS:  Pelvic pain 30-Jun-2023 11:31:36  Arpita Brito  Abnormal uterine bleeding 30-Jun-2023 11:31:27  Arpita Brito

## 2023-06-30 NOTE — ASU DISCHARGE PLAN (ADULT/PEDIATRIC) - CALL YOUR DOCTOR IF YOU HAVE ANY OF THE FOLLOWING:
Bleeding that does not stop/Fever greater than (need to indicate Fahrenheit or Celsius)/Wound/Surgical Site with redness, or foul smelling discharge or pus Bleeding that does not stop/Fever greater than (need to indicate Fahrenheit or Celsius)/Wound/Surgical Site with redness, or foul smelling discharge or pus/Nausea and vomiting that does not stop

## 2023-07-11 LAB — SURGICAL PATHOLOGY STUDY: SIGNIFICANT CHANGE UP

## 2023-07-13 ENCOUNTER — APPOINTMENT (OUTPATIENT)
Dept: OBGYN | Facility: CLINIC | Age: 48
End: 2023-07-13
Payer: COMMERCIAL

## 2023-07-13 VITALS
SYSTOLIC BLOOD PRESSURE: 116 MMHG | HEART RATE: 85 BPM | BODY MASS INDEX: 34.96 KG/M2 | DIASTOLIC BLOOD PRESSURE: 59 MMHG | HEIGHT: 67.72 IN | WEIGHT: 228 LBS

## 2023-07-13 PROCEDURE — 99024 POSTOP FOLLOW-UP VISIT: CPT

## 2023-07-13 NOTE — ED ADULT NURSE NOTE - CHIEF COMPLAINT QUOTE
Bilateral legs pt c/o b/l LE edema x2 hrs, with pain to L calf. Pt on Eliquis hx DVT and PEs, pt endorses SOB

## 2023-07-16 NOTE — HISTORY OF PRESENT ILLNESS
[FreeTextEntry1] : 46 yo P2 here for PO check after LSH BS for fibroids and adeno 6/30/23.\par No major complaints.\par

## 2023-07-16 NOTE — DISCUSSION/SUMMARY
[FreeTextEntry1] : 46 yo P2 here for PO check after LSH BS for fibroids and adeno 6/30/23.\par Supracervical performed at pt request after extended conversations about risks and benefits. \par Now recovering well.\par \par Plan\par Pt to f/u w/Dr. Ordonez \par Will send letter\par \par Letter to Dr. rOdonez

## 2023-08-02 ENCOUNTER — APPOINTMENT (OUTPATIENT)
Dept: DERMATOLOGY | Facility: CLINIC | Age: 48
End: 2023-08-02
Payer: COMMERCIAL

## 2023-08-02 DIAGNOSIS — L30.8 OTHER SPECIFIED DERMATITIS: ICD-10-CM

## 2023-08-02 PROCEDURE — 99214 OFFICE O/P EST MOD 30 MIN: CPT | Mod: 25

## 2023-08-02 PROCEDURE — 11900 INJECT SKIN LESIONS </W 7: CPT

## 2023-08-02 RX ORDER — DUPILUMAB 300 MG/2ML
300 INJECTION, SOLUTION SUBCUTANEOUS
Qty: 1 | Refills: 11 | Status: ACTIVE | COMMUNITY
Start: 2023-08-02 | End: 1900-01-01

## 2023-08-02 NOTE — ASSESSMENT
[FreeTextEntry1] : AD restart dupixent start q2wk can consider tapering if no itching  psoriasiform dermatitis rx VTAMA  LSC/AD requesting ILK today ILK 40mg/cc total 0.5cc into three areas on R leg

## 2023-08-02 NOTE — HISTORY OF PRESENT ILLNESS
[FreeTextEntry1] : atopic dermatitis [de-identified] : AD severe with areas of significant LSC was doing rather well on Dupixent with minimal to no itching but stopped in June and itching came back areas thickening again failed multiple topicals including opzelura now the lesions have a more psoriasiform component to them

## 2023-08-03 ENCOUNTER — TRANSCRIPTION ENCOUNTER (OUTPATIENT)
Age: 48
End: 2023-08-03

## 2023-08-03 DIAGNOSIS — R32 UNSPECIFIED URINARY INCONTINENCE: ICD-10-CM

## 2023-08-04 ENCOUNTER — TRANSCRIPTION ENCOUNTER (OUTPATIENT)
Age: 48
End: 2023-08-04

## 2023-08-07 ENCOUNTER — APPOINTMENT (OUTPATIENT)
Dept: GASTROENTEROLOGY | Facility: AMBULATORY MEDICAL SERVICES | Age: 48
End: 2023-08-07
Payer: COMMERCIAL

## 2023-08-07 LAB
BACTERIA STL CULT: NORMAL
HEMOCCULT STL QL IA: NEGATIVE

## 2023-08-07 PROCEDURE — 45378 DIAGNOSTIC COLONOSCOPY: CPT | Mod: 33

## 2023-08-10 ENCOUNTER — APPOINTMENT (OUTPATIENT)
Dept: OBGYN | Facility: CLINIC | Age: 48
End: 2023-08-10
Payer: COMMERCIAL

## 2023-08-10 VITALS
HEART RATE: 90 BPM | WEIGHT: 233 LBS | SYSTOLIC BLOOD PRESSURE: 127 MMHG | HEIGHT: 67.72 IN | DIASTOLIC BLOOD PRESSURE: 89 MMHG | BODY MASS INDEX: 35.73 KG/M2

## 2023-08-10 DIAGNOSIS — N80.0 ENDOMETRIOSIS OF UTERUS: ICD-10-CM

## 2023-08-10 LAB
CALPROTECTIN FECAL: 8 UG/G
H PYLORI AG STL QL: NEGATIVE
LACTOFERRIN STL-MCNC: <1 CD:794062635

## 2023-08-10 PROCEDURE — 99024 POSTOP FOLLOW-UP VISIT: CPT

## 2023-08-10 NOTE — HISTORY OF PRESENT ILLNESS
[FreeTextEntry1] : 46 yo P2 here for second PO check after LSH BS for fibroids and adeno 6/30/23. Pt seen by PT for pelvic support. No major complaints.

## 2023-08-10 NOTE — DISCUSSION/SUMMARY
[FreeTextEntry1] : 46 yo P2 here for second PO check after LSH BS for fibroids and adeno 6/30/23. Pt recovering well. Path benign.  Plan Pt will continue to follow w/PT

## 2023-08-11 LAB — PANCREATIC ELASTASE, FECAL: >500 CD:794062645

## 2023-08-14 ENCOUNTER — RESULT REVIEW (OUTPATIENT)
Age: 48
End: 2023-08-14

## 2023-08-14 ENCOUNTER — OUTPATIENT (OUTPATIENT)
Dept: OUTPATIENT SERVICES | Facility: HOSPITAL | Age: 48
LOS: 1 days | End: 2023-08-14
Payer: COMMERCIAL

## 2023-08-14 ENCOUNTER — APPOINTMENT (OUTPATIENT)
Dept: MAMMOGRAPHY | Facility: IMAGING CENTER | Age: 48
End: 2023-08-14
Payer: COMMERCIAL

## 2023-08-14 DIAGNOSIS — Z12.39 ENCOUNTER FOR OTHER SCREENING FOR MALIGNANT NEOPLASM OF BREAST: ICD-10-CM

## 2023-08-14 DIAGNOSIS — Z98.890 OTHER SPECIFIED POSTPROCEDURAL STATES: Chronic | ICD-10-CM

## 2023-08-14 PROCEDURE — 77067 SCR MAMMO BI INCL CAD: CPT

## 2023-08-14 PROCEDURE — 77063 BREAST TOMOSYNTHESIS BI: CPT | Mod: 26

## 2023-08-14 PROCEDURE — 77067 SCR MAMMO BI INCL CAD: CPT | Mod: 26

## 2023-08-14 PROCEDURE — 77063 BREAST TOMOSYNTHESIS BI: CPT

## 2023-08-21 NOTE — PRE-OP CHECKLIST - TEMPERATURE IN FAHRENHEIT (DEGREES F)
Right Cerebellar CVA, HTN, HLD, AFib RVR, Cholecystitis,  RUL Lung Nodule CT Chest ( 7/25).  on Tessalon 1 cap 3x day. Portable CXR done this evening.
97

## 2023-08-31 NOTE — ED PROVIDER NOTE - CPE EDP RESP NORM
Antibiotics sent to pharmacy along with pyridium.  Pt aware
Pt is on the appropriate antibiotic
normal...

## 2023-09-14 LAB — TSH SERPL-ACNC: 2.2 UIU/ML

## 2023-10-30 ENCOUNTER — OUTPATIENT (OUTPATIENT)
Dept: OUTPATIENT SERVICES | Facility: HOSPITAL | Age: 48
LOS: 1 days | Discharge: HOME | End: 2023-10-30

## 2023-10-30 DIAGNOSIS — Z98.890 OTHER SPECIFIED POSTPROCEDURAL STATES: Chronic | ICD-10-CM

## 2023-10-30 DIAGNOSIS — I26.99 OTHER PULMONARY EMBOLISM WITHOUT ACUTE COR PULMONALE: ICD-10-CM

## 2023-11-01 ENCOUNTER — APPOINTMENT (OUTPATIENT)
Dept: DERMATOLOGY | Facility: CLINIC | Age: 48
End: 2023-11-01
Payer: COMMERCIAL

## 2023-11-01 DIAGNOSIS — L28.0 LICHEN SIMPLEX CHRONICUS: ICD-10-CM

## 2023-11-01 PROCEDURE — 99213 OFFICE O/P EST LOW 20 MIN: CPT | Mod: 25

## 2023-11-01 PROCEDURE — 11900 INJECT SKIN LESIONS </W 7: CPT

## 2023-11-01 RX ORDER — MOMETASONE FUROATE 1 MG/G
0.1 OINTMENT TOPICAL
Qty: 1 | Refills: 2 | Status: DISCONTINUED | COMMUNITY
Start: 2022-09-08 | End: 2023-11-01

## 2023-11-01 RX ORDER — DESOXIMETASONE 0.5 MG/G
0.05 OINTMENT TOPICAL
Qty: 1 | Refills: 1 | Status: ACTIVE | COMMUNITY
Start: 2023-11-01 | End: 1900-01-01

## 2023-11-10 ENCOUNTER — APPOINTMENT (OUTPATIENT)
Dept: HEMATOLOGY ONCOLOGY | Facility: CLINIC | Age: 48
End: 2023-11-10

## 2023-11-10 ENCOUNTER — RESULT REVIEW (OUTPATIENT)
Age: 48
End: 2023-11-10

## 2023-11-10 ENCOUNTER — APPOINTMENT (OUTPATIENT)
Dept: HEMATOLOGY ONCOLOGY | Facility: CLINIC | Age: 48
End: 2023-11-10
Payer: COMMERCIAL

## 2023-11-10 VITALS
HEART RATE: 76 BPM | SYSTOLIC BLOOD PRESSURE: 110 MMHG | RESPIRATION RATE: 16 BRPM | OXYGEN SATURATION: 98 % | WEIGHT: 231.48 LBS | DIASTOLIC BLOOD PRESSURE: 55 MMHG | TEMPERATURE: 97 F | BODY MASS INDEX: 35.49 KG/M2

## 2023-11-10 LAB
BASOPHILS # BLD AUTO: 0.05 K/UL — SIGNIFICANT CHANGE UP (ref 0–0.2)
BASOPHILS # BLD AUTO: 0.05 K/UL — SIGNIFICANT CHANGE UP (ref 0–0.2)
BASOPHILS NFR BLD AUTO: 0.8 % — SIGNIFICANT CHANGE UP (ref 0–2)
BASOPHILS NFR BLD AUTO: 0.8 % — SIGNIFICANT CHANGE UP (ref 0–2)
EOSINOPHIL # BLD AUTO: 0.13 K/UL — SIGNIFICANT CHANGE UP (ref 0–0.5)
EOSINOPHIL # BLD AUTO: 0.13 K/UL — SIGNIFICANT CHANGE UP (ref 0–0.5)
EOSINOPHIL NFR BLD AUTO: 2.1 % — SIGNIFICANT CHANGE UP (ref 0–6)
EOSINOPHIL NFR BLD AUTO: 2.1 % — SIGNIFICANT CHANGE UP (ref 0–6)
HCT VFR BLD CALC: 37.6 % — SIGNIFICANT CHANGE UP (ref 34.5–45)
HCT VFR BLD CALC: 37.6 % — SIGNIFICANT CHANGE UP (ref 34.5–45)
HGB BLD-MCNC: 12.2 G/DL — SIGNIFICANT CHANGE UP (ref 11.5–15.5)
HGB BLD-MCNC: 12.2 G/DL — SIGNIFICANT CHANGE UP (ref 11.5–15.5)
IMM GRANULOCYTES NFR BLD AUTO: 0.5 % — SIGNIFICANT CHANGE UP (ref 0–0.9)
IMM GRANULOCYTES NFR BLD AUTO: 0.5 % — SIGNIFICANT CHANGE UP (ref 0–0.9)
LYMPHOCYTES # BLD AUTO: 1.93 K/UL — SIGNIFICANT CHANGE UP (ref 1–3.3)
LYMPHOCYTES # BLD AUTO: 1.93 K/UL — SIGNIFICANT CHANGE UP (ref 1–3.3)
LYMPHOCYTES # BLD AUTO: 31.3 % — SIGNIFICANT CHANGE UP (ref 13–44)
LYMPHOCYTES # BLD AUTO: 31.3 % — SIGNIFICANT CHANGE UP (ref 13–44)
MCHC RBC-ENTMCNC: 27.4 PG — SIGNIFICANT CHANGE UP (ref 27–34)
MCHC RBC-ENTMCNC: 27.4 PG — SIGNIFICANT CHANGE UP (ref 27–34)
MCHC RBC-ENTMCNC: 32.4 G/DL — SIGNIFICANT CHANGE UP (ref 32–36)
MCHC RBC-ENTMCNC: 32.4 G/DL — SIGNIFICANT CHANGE UP (ref 32–36)
MCV RBC AUTO: 84.3 FL — SIGNIFICANT CHANGE UP (ref 80–100)
MCV RBC AUTO: 84.3 FL — SIGNIFICANT CHANGE UP (ref 80–100)
MONOCYTES # BLD AUTO: 0.44 K/UL — SIGNIFICANT CHANGE UP (ref 0–0.9)
MONOCYTES # BLD AUTO: 0.44 K/UL — SIGNIFICANT CHANGE UP (ref 0–0.9)
MONOCYTES NFR BLD AUTO: 7.1 % — SIGNIFICANT CHANGE UP (ref 2–14)
MONOCYTES NFR BLD AUTO: 7.1 % — SIGNIFICANT CHANGE UP (ref 2–14)
NEUTROPHILS # BLD AUTO: 3.59 K/UL — SIGNIFICANT CHANGE UP (ref 1.8–7.4)
NEUTROPHILS # BLD AUTO: 3.59 K/UL — SIGNIFICANT CHANGE UP (ref 1.8–7.4)
NEUTROPHILS NFR BLD AUTO: 58.2 % — SIGNIFICANT CHANGE UP (ref 43–77)
NEUTROPHILS NFR BLD AUTO: 58.2 % — SIGNIFICANT CHANGE UP (ref 43–77)
NRBC # BLD: 0 /100 WBCS — SIGNIFICANT CHANGE UP (ref 0–0)
NRBC # BLD: 0 /100 WBCS — SIGNIFICANT CHANGE UP (ref 0–0)
PLATELET # BLD AUTO: 466 K/UL — HIGH (ref 150–400)
PLATELET # BLD AUTO: 466 K/UL — HIGH (ref 150–400)
RBC # BLD: 4.46 M/UL — SIGNIFICANT CHANGE UP (ref 3.8–5.2)
RBC # BLD: 4.46 M/UL — SIGNIFICANT CHANGE UP (ref 3.8–5.2)
RBC # FLD: 14.1 % — SIGNIFICANT CHANGE UP (ref 10.3–14.5)
RBC # FLD: 14.1 % — SIGNIFICANT CHANGE UP (ref 10.3–14.5)
WBC # BLD: 6.17 K/UL — SIGNIFICANT CHANGE UP (ref 3.8–10.5)
WBC # BLD: 6.17 K/UL — SIGNIFICANT CHANGE UP (ref 3.8–10.5)
WBC # FLD AUTO: 6.17 K/UL — SIGNIFICANT CHANGE UP (ref 3.8–10.5)
WBC # FLD AUTO: 6.17 K/UL — SIGNIFICANT CHANGE UP (ref 3.8–10.5)

## 2023-11-10 PROCEDURE — 99215 OFFICE O/P EST HI 40 MIN: CPT

## 2023-11-13 LAB
ALBUMIN SERPL ELPH-MCNC: 4.1 G/DL
ALP BLD-CCNC: 98 U/L
ALT SERPL-CCNC: 11 U/L
ANION GAP SERPL CALC-SCNC: 10 MMOL/L
AST SERPL-CCNC: 13 U/L
BILIRUB SERPL-MCNC: 0.2 MG/DL
BUN SERPL-MCNC: 9 MG/DL
CALCIUM SERPL-MCNC: 9 MG/DL
CHLORIDE SERPL-SCNC: 104 MMOL/L
CO2 SERPL-SCNC: 25 MMOL/L
CREAT SERPL-MCNC: 0.75 MG/DL
EGFR: 99 ML/MIN/1.73M2
FERRITIN SERPL-MCNC: 21 NG/ML
GLUCOSE SERPL-MCNC: 103 MG/DL
IRON SATN MFR SERPL: 13 %
IRON SERPL-MCNC: 45 UG/DL
POTASSIUM SERPL-SCNC: 4.7 MMOL/L
PROT SERPL-MCNC: 6.4 G/DL
SODIUM SERPL-SCNC: 140 MMOL/L
TIBC SERPL-MCNC: 334 UG/DL
UIBC SERPL-MCNC: 289 UG/DL

## 2023-12-13 ENCOUNTER — EMERGENCY (EMERGENCY)
Facility: HOSPITAL | Age: 48
LOS: 1 days | Discharge: HOME | End: 2023-12-13
Attending: EMERGENCY MEDICINE | Admitting: EMERGENCY MEDICINE
Payer: COMMERCIAL

## 2023-12-13 VITALS
HEART RATE: 81 BPM | SYSTOLIC BLOOD PRESSURE: 128 MMHG | RESPIRATION RATE: 16 BRPM | DIASTOLIC BLOOD PRESSURE: 66 MMHG | OXYGEN SATURATION: 100 % | TEMPERATURE: 98 F

## 2023-12-13 VITALS
RESPIRATION RATE: 15 BRPM | TEMPERATURE: 98 F | OXYGEN SATURATION: 100 % | SYSTOLIC BLOOD PRESSURE: 123 MMHG | DIASTOLIC BLOOD PRESSURE: 60 MMHG | HEART RATE: 79 BPM

## 2023-12-13 DIAGNOSIS — Z98.890 OTHER SPECIFIED POSTPROCEDURAL STATES: Chronic | ICD-10-CM

## 2023-12-13 LAB
ALBUMIN SERPL ELPH-MCNC: 3.8 G/DL — SIGNIFICANT CHANGE UP (ref 3.3–5)
ALBUMIN SERPL ELPH-MCNC: 3.8 G/DL — SIGNIFICANT CHANGE UP (ref 3.3–5)
ALP SERPL-CCNC: 89 U/L — SIGNIFICANT CHANGE UP (ref 40–120)
ALP SERPL-CCNC: 89 U/L — SIGNIFICANT CHANGE UP (ref 40–120)
ALT FLD-CCNC: 15 U/L — SIGNIFICANT CHANGE UP (ref 4–33)
ALT FLD-CCNC: 15 U/L — SIGNIFICANT CHANGE UP (ref 4–33)
ANION GAP SERPL CALC-SCNC: 14 MMOL/L — SIGNIFICANT CHANGE UP (ref 7–14)
ANION GAP SERPL CALC-SCNC: 14 MMOL/L — SIGNIFICANT CHANGE UP (ref 7–14)
APTT BLD: 33.3 SEC — SIGNIFICANT CHANGE UP (ref 24.5–35.6)
APTT BLD: 33.3 SEC — SIGNIFICANT CHANGE UP (ref 24.5–35.6)
AST SERPL-CCNC: 21 U/L — SIGNIFICANT CHANGE UP (ref 4–32)
AST SERPL-CCNC: 21 U/L — SIGNIFICANT CHANGE UP (ref 4–32)
BASOPHILS # BLD AUTO: 0.05 K/UL — SIGNIFICANT CHANGE UP (ref 0–0.2)
BASOPHILS # BLD AUTO: 0.05 K/UL — SIGNIFICANT CHANGE UP (ref 0–0.2)
BASOPHILS NFR BLD AUTO: 0.6 % — SIGNIFICANT CHANGE UP (ref 0–2)
BASOPHILS NFR BLD AUTO: 0.6 % — SIGNIFICANT CHANGE UP (ref 0–2)
BILIRUB SERPL-MCNC: 0.3 MG/DL — SIGNIFICANT CHANGE UP (ref 0.2–1.2)
BILIRUB SERPL-MCNC: 0.3 MG/DL — SIGNIFICANT CHANGE UP (ref 0.2–1.2)
BUN SERPL-MCNC: 13 MG/DL — SIGNIFICANT CHANGE UP (ref 7–23)
BUN SERPL-MCNC: 13 MG/DL — SIGNIFICANT CHANGE UP (ref 7–23)
CALCIUM SERPL-MCNC: 8.8 MG/DL — SIGNIFICANT CHANGE UP (ref 8.4–10.5)
CALCIUM SERPL-MCNC: 8.8 MG/DL — SIGNIFICANT CHANGE UP (ref 8.4–10.5)
CHLORIDE SERPL-SCNC: 102 MMOL/L — SIGNIFICANT CHANGE UP (ref 98–107)
CHLORIDE SERPL-SCNC: 102 MMOL/L — SIGNIFICANT CHANGE UP (ref 98–107)
CO2 SERPL-SCNC: 23 MMOL/L — SIGNIFICANT CHANGE UP (ref 22–31)
CO2 SERPL-SCNC: 23 MMOL/L — SIGNIFICANT CHANGE UP (ref 22–31)
CREAT SERPL-MCNC: 0.71 MG/DL — SIGNIFICANT CHANGE UP (ref 0.5–1.3)
CREAT SERPL-MCNC: 0.71 MG/DL — SIGNIFICANT CHANGE UP (ref 0.5–1.3)
EGFR: 106 ML/MIN/1.73M2 — SIGNIFICANT CHANGE UP
EGFR: 106 ML/MIN/1.73M2 — SIGNIFICANT CHANGE UP
EOSINOPHIL # BLD AUTO: 0.18 K/UL — SIGNIFICANT CHANGE UP (ref 0–0.5)
EOSINOPHIL # BLD AUTO: 0.18 K/UL — SIGNIFICANT CHANGE UP (ref 0–0.5)
EOSINOPHIL NFR BLD AUTO: 2.3 % — SIGNIFICANT CHANGE UP (ref 0–6)
EOSINOPHIL NFR BLD AUTO: 2.3 % — SIGNIFICANT CHANGE UP (ref 0–6)
GLUCOSE SERPL-MCNC: 110 MG/DL — HIGH (ref 70–99)
GLUCOSE SERPL-MCNC: 110 MG/DL — HIGH (ref 70–99)
HCT VFR BLD CALC: 37.4 % — SIGNIFICANT CHANGE UP (ref 34.5–45)
HCT VFR BLD CALC: 37.4 % — SIGNIFICANT CHANGE UP (ref 34.5–45)
HGB BLD-MCNC: 12.1 G/DL — SIGNIFICANT CHANGE UP (ref 11.5–15.5)
HGB BLD-MCNC: 12.1 G/DL — SIGNIFICANT CHANGE UP (ref 11.5–15.5)
IANC: 4.59 K/UL — SIGNIFICANT CHANGE UP (ref 1.8–7.4)
IANC: 4.59 K/UL — SIGNIFICANT CHANGE UP (ref 1.8–7.4)
IMM GRANULOCYTES NFR BLD AUTO: 0.3 % — SIGNIFICANT CHANGE UP (ref 0–0.9)
IMM GRANULOCYTES NFR BLD AUTO: 0.3 % — SIGNIFICANT CHANGE UP (ref 0–0.9)
INR BLD: 1.08 RATIO — SIGNIFICANT CHANGE UP (ref 0.85–1.18)
INR BLD: 1.08 RATIO — SIGNIFICANT CHANGE UP (ref 0.85–1.18)
LYMPHOCYTES # BLD AUTO: 2.45 K/UL — SIGNIFICANT CHANGE UP (ref 1–3.3)
LYMPHOCYTES # BLD AUTO: 2.45 K/UL — SIGNIFICANT CHANGE UP (ref 1–3.3)
LYMPHOCYTES # BLD AUTO: 31.3 % — SIGNIFICANT CHANGE UP (ref 13–44)
LYMPHOCYTES # BLD AUTO: 31.3 % — SIGNIFICANT CHANGE UP (ref 13–44)
MCHC RBC-ENTMCNC: 26.7 PG — LOW (ref 27–34)
MCHC RBC-ENTMCNC: 26.7 PG — LOW (ref 27–34)
MCHC RBC-ENTMCNC: 32.4 GM/DL — SIGNIFICANT CHANGE UP (ref 32–36)
MCHC RBC-ENTMCNC: 32.4 GM/DL — SIGNIFICANT CHANGE UP (ref 32–36)
MCV RBC AUTO: 82.4 FL — SIGNIFICANT CHANGE UP (ref 80–100)
MCV RBC AUTO: 82.4 FL — SIGNIFICANT CHANGE UP (ref 80–100)
MONOCYTES # BLD AUTO: 0.55 K/UL — SIGNIFICANT CHANGE UP (ref 0–0.9)
MONOCYTES # BLD AUTO: 0.55 K/UL — SIGNIFICANT CHANGE UP (ref 0–0.9)
MONOCYTES NFR BLD AUTO: 7 % — SIGNIFICANT CHANGE UP (ref 2–14)
MONOCYTES NFR BLD AUTO: 7 % — SIGNIFICANT CHANGE UP (ref 2–14)
NEUTROPHILS # BLD AUTO: 4.59 K/UL — SIGNIFICANT CHANGE UP (ref 1.8–7.4)
NEUTROPHILS # BLD AUTO: 4.59 K/UL — SIGNIFICANT CHANGE UP (ref 1.8–7.4)
NEUTROPHILS NFR BLD AUTO: 58.5 % — SIGNIFICANT CHANGE UP (ref 43–77)
NEUTROPHILS NFR BLD AUTO: 58.5 % — SIGNIFICANT CHANGE UP (ref 43–77)
NRBC # BLD: 0 /100 WBCS — SIGNIFICANT CHANGE UP (ref 0–0)
NRBC # BLD: 0 /100 WBCS — SIGNIFICANT CHANGE UP (ref 0–0)
NRBC # FLD: 0 K/UL — SIGNIFICANT CHANGE UP (ref 0–0)
NRBC # FLD: 0 K/UL — SIGNIFICANT CHANGE UP (ref 0–0)
NT-PROBNP SERPL-SCNC: <36 PG/ML — SIGNIFICANT CHANGE UP
NT-PROBNP SERPL-SCNC: <36 PG/ML — SIGNIFICANT CHANGE UP
PLATELET # BLD AUTO: 449 K/UL — HIGH (ref 150–400)
PLATELET # BLD AUTO: 449 K/UL — HIGH (ref 150–400)
POTASSIUM SERPL-MCNC: 4.5 MMOL/L — SIGNIFICANT CHANGE UP (ref 3.5–5.3)
POTASSIUM SERPL-MCNC: 4.5 MMOL/L — SIGNIFICANT CHANGE UP (ref 3.5–5.3)
POTASSIUM SERPL-SCNC: 4.5 MMOL/L — SIGNIFICANT CHANGE UP (ref 3.5–5.3)
POTASSIUM SERPL-SCNC: 4.5 MMOL/L — SIGNIFICANT CHANGE UP (ref 3.5–5.3)
PROT SERPL-MCNC: 6.8 G/DL — SIGNIFICANT CHANGE UP (ref 6–8.3)
PROT SERPL-MCNC: 6.8 G/DL — SIGNIFICANT CHANGE UP (ref 6–8.3)
PROTHROM AB SERPL-ACNC: 12.1 SEC — SIGNIFICANT CHANGE UP (ref 9.5–13)
PROTHROM AB SERPL-ACNC: 12.1 SEC — SIGNIFICANT CHANGE UP (ref 9.5–13)
RBC # BLD: 4.54 M/UL — SIGNIFICANT CHANGE UP (ref 3.8–5.2)
RBC # BLD: 4.54 M/UL — SIGNIFICANT CHANGE UP (ref 3.8–5.2)
RBC # FLD: 14.3 % — SIGNIFICANT CHANGE UP (ref 10.3–14.5)
RBC # FLD: 14.3 % — SIGNIFICANT CHANGE UP (ref 10.3–14.5)
SODIUM SERPL-SCNC: 139 MMOL/L — SIGNIFICANT CHANGE UP (ref 135–145)
SODIUM SERPL-SCNC: 139 MMOL/L — SIGNIFICANT CHANGE UP (ref 135–145)
TROPONIN T, HIGH SENSITIVITY RESULT: <6 NG/L — SIGNIFICANT CHANGE UP
TROPONIN T, HIGH SENSITIVITY RESULT: <6 NG/L — SIGNIFICANT CHANGE UP
WBC # BLD: 7.84 K/UL — SIGNIFICANT CHANGE UP (ref 3.8–10.5)
WBC # BLD: 7.84 K/UL — SIGNIFICANT CHANGE UP (ref 3.8–10.5)
WBC # FLD AUTO: 7.84 K/UL — SIGNIFICANT CHANGE UP (ref 3.8–10.5)
WBC # FLD AUTO: 7.84 K/UL — SIGNIFICANT CHANGE UP (ref 3.8–10.5)

## 2023-12-13 PROCEDURE — 93971 EXTREMITY STUDY: CPT | Mod: 26,LT

## 2023-12-13 PROCEDURE — 99284 EMERGENCY DEPT VISIT MOD MDM: CPT

## 2023-12-13 RX ORDER — ACETAMINOPHEN 500 MG
650 TABLET ORAL ONCE
Refills: 0 | Status: COMPLETED | OUTPATIENT
Start: 2023-12-13 | End: 2023-12-13

## 2023-12-13 RX ADMIN — Medication 650 MILLIGRAM(S): at 04:46

## 2023-12-13 RX ADMIN — Medication 650 MILLIGRAM(S): at 04:10

## 2023-12-13 NOTE — ED PROVIDER NOTE - PHYSICAL EXAMINATION
Constitutional: Well nourished, well developed, appears stated age.   Eyes: PERRL, EOMI. No scleral icterus  HENT: Moist mucous membranes.   Neck: Supple. No goiter.   CV: RRR, no m/r/g. Well perfused extremities. Mild non pitting edema bilateral LEs.   RESP: Lungs CTAB, normal respiratory effort  GI: Soft, non-tender, no massess appreciated  MSK: Moving all four extremities. No obvious deformity  Skin: Warm, dry, no rashes  Neuro: Alert and oriented.  Normal strength and sensation of UEs and LEs  Psych: Appropriate mood and affect

## 2023-12-13 NOTE — ED ADULT NURSE NOTE - OBJECTIVE STATEMENT
Pt received to 24A, A&O x 4, ambulatory, Pt received to 24A, A&O x 4, ambulatory, hx of PE, DVT, coming to ED with complaints of 8/10 pain to the lower leg. Pt reports a Pt received to 24A, A&O x 4, ambulatory, hx of PE, DVT, on Eliquis coming to ED with complaints of 8/10 pain to the lower leg. Pt reports being awoken from sleep with sharp pain, endorses calf pain with ambulation, increased tenderness, denies swelling, reports sensation is similar to past DVT. Area non reddened, slightly swollen, pulses 2+, no abnormal warmth. Pt denies HA, double/blurry vision, chest pain, SOB, n/v/d, numbness/tingling, weakness. 20G IV placed to L hand, medicated as per EMAR, labs drawn and sent. Safety maintained, comfort provided, awaiting imaging at this time Pt received to 24A, A&O x 4, ambulatory, hx of PE, DVT, on Eliquis coming to ED with complaints of 8/10 pain to the lower leg. Pt reports being awoken from sleep with sharp pain, endorsing calf pain, aggrevated with ambulation, increased tenderness, denies noting increase in swelling to legs, reports sensation is similar to past DVT. Upon examination, left lower leg appears non reddened, slightly swollen, pulses 2+, no abnormal warmth, sensation intact, strong muscle strength, + ROM. Pt denies HA, double/blurry vision, chest pain, SOB, n/v/d, numbness/tingling, weakness. No neuro deficits noted, VS stable, chest rise equal, respirations unlabored, O2 100% on RA, abdomen non-tender, non-distended.  20G IV placed to L hand, medicated as per EMAR, labs drawn and sent. Safety maintained, comfort provided, awaiting imaging at this time Pt received to 24A, A&O x 4, ambulatory, hx of PE, DVT, on Eliquis coming to ED with complaints of 8/10 pain to the lower leg. Pt reports being awoken from sleep with sharp pain, endorsing calf pain, aggravated with ambulation, increased tenderness, denies noting increase in swelling to legs, reports sensation is similar to past DVT. Pt also reports having baseline SOB at baseline, reports SOB is occasional upon exertion, has had symptom for past three years following PE. Upon examination, left lower leg appears non reddened, slightly swollen, pulses 2+, no abnormal warmth, sensation intact, strong muscle strength, + ROM. Pt denies HA, double/blurry vision, chest pain, n/v/d, numbness/tingling, weakness. No neuro deficits noted, VS stable, lung sounds clear, chest rise equal, respirations unlabored, O2 100% on RA, abdomen non-tender, non-distended.  20G IV placed to L hand, medicated as per EMAR, labs drawn and sent. Safety maintained, comfort provided, awaiting imaging at this time

## 2023-12-13 NOTE — ED PROVIDER NOTE - PATIENT PORTAL LINK FT
You can access the FollowMyHealth Patient Portal offered by [facility] by registering at the following website: [email or web address]. By joining [employer]’s FollowMyHealth portal, you will also be able to view your health information using other applications (apps) compatible with our system. You can access the FollowMyHealth Patient Portal offered by [facility] by registering at the following website: [email or web address]. By joining [employer]’s FollowMyHealth portal, you will also be able to view your health information using other applications (apps) compatible with our system.

## 2023-12-13 NOTE — ED PROVIDER NOTE - NSFOLLOWUPINSTRUCTIONS_ED_ALL_ED_FT
You were seen in the emergency department for left leg pain.  We evaluated for any evidence of bleeding abnormalities, any evidence of a clot in the left leg.  We also checked basic labs to evaluate if your heart was under any strain, and this was all normal.    At this time is unclear what is causing your left leg pain, but there is no evidence of a clot.  Please follow-up with your primary care physician within 1 week to discuss your symptoms.    If you start to have severe or worsening pain in your left leg, start to have any significantly worse difficulty breathing, or severe chest pain, or cough up any blood, then please return to the emergency department.

## 2023-12-13 NOTE — ED ADULT TRIAGE NOTE - CHIEF COMPLAINT QUOTE
Pt c/o left leg pain since yesterday afternoon. States "feels like past DVT". Hx. DVT. PE. on Eliquis. Pt denies chest pain, sob, recent falls or injures. Pt well appearing.

## 2023-12-13 NOTE — ED ADULT NURSE NOTE - NSFALLUNIVINTERV_ED_ALL_ED
Bed/Stretcher in lowest position, wheels locked, appropriate side rails in place/Call bell, personal items and telephone in reach/Instruct patient to call for assistance before getting out of bed/chair/stretcher/Non-slip footwear applied when patient is off stretcher/[place] to call system/Physically safe environment - no spills, clutter or unnecessary equipment/Purposeful proactive rounding/Room/bathroom lighting operational, light cord in reach Bed/Stretcher in lowest position, wheels locked, appropriate side rails in place/Call bell, personal items and telephone in reach/Instruct patient to call for assistance before getting out of bed/chair/stretcher/Non-slip footwear applied when patient is off stretcher/[place] to call system/Physically safe environment - no spills, clutter or unnecessary equipment/Purposeful proactive rounding/Room/bathroom lighting operational, light cord in reach

## 2023-12-13 NOTE — ED PROVIDER NOTE - OBJECTIVE STATEMENT
48-year-old female with history of PE, DVT presenting with left leg pain going from the heel up to the left hip that started tonight waking her from sleep.  Patient states that this feels similar to her prior DVT that she had in the past.  States that she is concerned she may have a clot in her leg.  Notes that she has been missing some of her nighttime doses of Eliquis.    Also notes that she has had persistent shortness of breath for years ongoing before she was first diagnosed with PE or DVT, and never has found any answers to what is causing it.  She states that her shortness of breath is at baseline which waxes and wanes and is currently in a worse spot than normal.    Denies any recent illnesses fever cough runny nose urinary symptoms rashes.

## 2023-12-13 NOTE — ED PROVIDER NOTE - ATTENDING CONTRIBUTION TO CARE
48-year-old female with a past medical history of DVT and PE presenting with pain in her left leg.  It goes from the heel to the left hip.  Started tonight and woke her from sleep.  Similar to her previous DVT pain.  Patient has not been compliant with all of her Eliquis doses.    Vitals: I have reviewed the patients vital signs  General: nontoxic appearing  HEENT: Atraumatic, normocephalic, airway patent  Eyes: EOMI, tracking appropriately  Neck: no tracheal deviation  Chest/Lungs: no trauma, symmetric chest rise, speaking in complete sentences,  no resp distress  Heart: skin and extremities well perfused, regular rate and rhythm  Neuro: A+Ox3, appears non focal  MSK: no deformities there is nonpitting edema that is symmetric in bilateral in the lower extremities good distal PMS that is symmetric  Skin: no cyanosis, no jaundice   Psych:  Normal mood and affect    48-year-old female past medical history of DVT and PE not compliant with all Eliquis dosing presenting with left leg pain.  Need to consider a recurrent DVT in the setting of noncompliant medication will get ultrasound to evaluate.

## 2023-12-13 NOTE — ED PROVIDER NOTE - CLINICAL SUMMARY MEDICAL DECISION MAKING FREE TEXT BOX
She 48-year-old female with history of DVT and PEs on Eliquis, but missing doses presenting with left leg pain shooting from foot to hip that is reminiscent of prior DVT.  Also noting some shortness of breath which is at baseline for the past 3 years but and its for stage.    Differential includes but is not limited to DVT versus peripheral neuropathy versus left leg strain.  No trauma to suggest any fracture, strain, no rashes suggest zoster.  Less likely etiology and have given no difficulty with ambulation, and pain waking her from sleep tonight.  Plan for basic labs, EKG, BNP, and duplex bilateral lower extremities.

## 2023-12-13 NOTE — ED ADULT NURSE REASSESSMENT NOTE - NS ED NURSE REASSESS COMMENT FT1
Pt resting in stretcher, A&O x 4, offers partial relief following medication administration, 6/10 to the lower left calf, declines additional pain medication at this time. Pt denies chest pain, SOB, numbness/tingling. Safety maintained, comfort provided, awaiting further evaluation at this time.

## 2023-12-13 NOTE — ED ADULT NURSE NOTE - NS ED NURSE DISCH DISPOSITION
Patient seen and examined, agree with above. Meeting post partum milestones, for probable discharge today. Home care and precautions given.     Gen: AAOx3, NAD   Abd: Soft, appropriately tender, fundus firm, incision c/d/i   Ext: NT I personally saw and evaluated pt and agree with Dr Bynum's assessment and plan.  Pt feels well on POD 1 after emergent  section.  Her pain is well controlled and she is tolerating regular diet.  Incision is c/d/i  Continue routine PO care    KRISTA Jiménez Discharged

## 2024-02-06 ENCOUNTER — OUTPATIENT (OUTPATIENT)
Dept: OUTPATIENT SERVICES | Facility: HOSPITAL | Age: 49
LOS: 1 days | Discharge: HOME | End: 2024-02-06

## 2024-02-06 DIAGNOSIS — Z98.890 OTHER SPECIFIED POSTPROCEDURAL STATES: Chronic | ICD-10-CM

## 2024-02-06 DIAGNOSIS — I26.99 OTHER PULMONARY EMBOLISM WITHOUT ACUTE COR PULMONALE: ICD-10-CM

## 2024-02-07 ENCOUNTER — APPOINTMENT (OUTPATIENT)
Dept: DERMATOLOGY | Facility: CLINIC | Age: 49
End: 2024-02-07

## 2024-02-07 DIAGNOSIS — Z01.411 ENCOUNTER FOR GYNECOLOGICAL EXAMINATION (GENERAL) (ROUTINE) WITH ABNORMAL FINDINGS: ICD-10-CM

## 2024-02-08 ENCOUNTER — EMERGENCY (EMERGENCY)
Facility: HOSPITAL | Age: 49
LOS: 1 days | Discharge: HOME | End: 2024-02-08
Attending: EMERGENCY MEDICINE | Admitting: EMERGENCY MEDICINE
Payer: COMMERCIAL

## 2024-02-08 VITALS
TEMPERATURE: 99 F | OXYGEN SATURATION: 96 % | DIASTOLIC BLOOD PRESSURE: 87 MMHG | RESPIRATION RATE: 22 BRPM | HEIGHT: 70 IN | HEART RATE: 90 BPM | SYSTOLIC BLOOD PRESSURE: 133 MMHG | WEIGHT: 220.9 LBS

## 2024-02-08 VITALS
HEART RATE: 83 BPM | DIASTOLIC BLOOD PRESSURE: 72 MMHG | RESPIRATION RATE: 18 BRPM | OXYGEN SATURATION: 100 % | TEMPERATURE: 100 F | SYSTOLIC BLOOD PRESSURE: 129 MMHG

## 2024-02-08 DIAGNOSIS — R06.02 SHORTNESS OF BREATH: ICD-10-CM

## 2024-02-08 DIAGNOSIS — Z98.890 OTHER SPECIFIED POSTPROCEDURAL STATES: Chronic | ICD-10-CM

## 2024-02-08 DIAGNOSIS — Z88.8 ALLERGY STATUS TO OTHER DRUGS, MEDICAMENTS AND BIOLOGICAL SUBSTANCES: ICD-10-CM

## 2024-02-08 DIAGNOSIS — J11.1 INFLUENZA DUE TO UNIDENTIFIED INFLUENZA VIRUS WITH OTHER RESPIRATORY MANIFESTATIONS: ICD-10-CM

## 2024-02-08 DIAGNOSIS — Z86.711 PERSONAL HISTORY OF PULMONARY EMBOLISM: ICD-10-CM

## 2024-02-08 DIAGNOSIS — Z20.822 CONTACT WITH AND (SUSPECTED) EXPOSURE TO COVID-19: ICD-10-CM

## 2024-02-08 DIAGNOSIS — Z86.718 PERSONAL HISTORY OF OTHER VENOUS THROMBOSIS AND EMBOLISM: ICD-10-CM

## 2024-02-08 DIAGNOSIS — Z88.1 ALLERGY STATUS TO OTHER ANTIBIOTIC AGENTS STATUS: ICD-10-CM

## 2024-02-08 DIAGNOSIS — Z79.01 LONG TERM (CURRENT) USE OF ANTICOAGULANTS: ICD-10-CM

## 2024-02-08 LAB
ALBUMIN SERPL ELPH-MCNC: 4 G/DL — SIGNIFICANT CHANGE UP (ref 3.3–5)
ALP SERPL-CCNC: 98 U/L — SIGNIFICANT CHANGE UP (ref 40–120)
ALT FLD-CCNC: 14 U/L — SIGNIFICANT CHANGE UP (ref 10–45)
ANION GAP SERPL CALC-SCNC: 9 MMOL/L — SIGNIFICANT CHANGE UP (ref 5–17)
APTT BLD: 36.1 SEC — HIGH (ref 24.5–35.6)
AST SERPL-CCNC: 17 U/L — SIGNIFICANT CHANGE UP (ref 10–40)
BASOPHILS # BLD AUTO: 0.03 K/UL — SIGNIFICANT CHANGE UP (ref 0–0.2)
BASOPHILS NFR BLD AUTO: 0.5 % — SIGNIFICANT CHANGE UP (ref 0–2)
BILIRUB SERPL-MCNC: 0.2 MG/DL — SIGNIFICANT CHANGE UP (ref 0.2–1.2)
BUN SERPL-MCNC: 8 MG/DL — SIGNIFICANT CHANGE UP (ref 7–23)
CALCIUM SERPL-MCNC: 8.9 MG/DL — SIGNIFICANT CHANGE UP (ref 8.4–10.5)
CHLORIDE SERPL-SCNC: 101 MMOL/L — SIGNIFICANT CHANGE UP (ref 96–108)
CO2 SERPL-SCNC: 24 MMOL/L — SIGNIFICANT CHANGE UP (ref 22–31)
CREAT SERPL-MCNC: 0.64 MG/DL — SIGNIFICANT CHANGE UP (ref 0.5–1.3)
EGFR: 110 ML/MIN/1.73M2 — SIGNIFICANT CHANGE UP
EOSINOPHIL # BLD AUTO: 0.09 K/UL — SIGNIFICANT CHANGE UP (ref 0–0.5)
EOSINOPHIL NFR BLD AUTO: 1.6 % — SIGNIFICANT CHANGE UP (ref 0–6)
FLUAV H3 RNA SPEC QL NAA+PROBE: DETECTED
GLUCOSE SERPL-MCNC: 136 MG/DL — HIGH (ref 70–99)
HCT VFR BLD CALC: 39.8 % — SIGNIFICANT CHANGE UP (ref 34.5–45)
HGB BLD-MCNC: 12.7 G/DL — SIGNIFICANT CHANGE UP (ref 11.5–15.5)
IMM GRANULOCYTES NFR BLD AUTO: 0.4 % — SIGNIFICANT CHANGE UP (ref 0–0.9)
INR BLD: 1.14 — SIGNIFICANT CHANGE UP (ref 0.85–1.18)
LYMPHOCYTES # BLD AUTO: 1.23 K/UL — SIGNIFICANT CHANGE UP (ref 1–3.3)
LYMPHOCYTES # BLD AUTO: 22.5 % — SIGNIFICANT CHANGE UP (ref 13–44)
MCHC RBC-ENTMCNC: 27.5 PG — SIGNIFICANT CHANGE UP (ref 27–34)
MCHC RBC-ENTMCNC: 31.9 GM/DL — LOW (ref 32–36)
MCV RBC AUTO: 86.1 FL — SIGNIFICANT CHANGE UP (ref 80–100)
MONOCYTES # BLD AUTO: 0.7 K/UL — SIGNIFICANT CHANGE UP (ref 0–0.9)
MONOCYTES NFR BLD AUTO: 12.8 % — SIGNIFICANT CHANGE UP (ref 2–14)
NEUTROPHILS # BLD AUTO: 3.4 K/UL — SIGNIFICANT CHANGE UP (ref 1.8–7.4)
NEUTROPHILS NFR BLD AUTO: 62.2 % — SIGNIFICANT CHANGE UP (ref 43–77)
NRBC # BLD: 0 /100 WBCS — SIGNIFICANT CHANGE UP (ref 0–0)
PLATELET # BLD AUTO: 378 K/UL — SIGNIFICANT CHANGE UP (ref 150–400)
POTASSIUM SERPL-MCNC: 4.3 MMOL/L — SIGNIFICANT CHANGE UP (ref 3.5–5.3)
POTASSIUM SERPL-SCNC: 4.3 MMOL/L — SIGNIFICANT CHANGE UP (ref 3.5–5.3)
PROT SERPL-MCNC: 7 G/DL — SIGNIFICANT CHANGE UP (ref 6–8.3)
PROTHROM AB SERPL-ACNC: 12.9 SEC — SIGNIFICANT CHANGE UP (ref 9.5–13)
RAPID RVP RESULT: DETECTED
RBC # BLD: 4.62 M/UL — SIGNIFICANT CHANGE UP (ref 3.8–5.2)
RBC # FLD: 14.4 % — SIGNIFICANT CHANGE UP (ref 10.3–14.5)
RVP NOTIFY: SIGNIFICANT CHANGE UP
SARS-COV-2 RNA SPEC QL NAA+PROBE: SIGNIFICANT CHANGE UP
SODIUM SERPL-SCNC: 134 MMOL/L — LOW (ref 135–145)
WBC # BLD: 5.47 K/UL — SIGNIFICANT CHANGE UP (ref 3.8–10.5)
WBC # FLD AUTO: 5.47 K/UL — SIGNIFICANT CHANGE UP (ref 3.8–10.5)

## 2024-02-08 PROCEDURE — 99284 EMERGENCY DEPT VISIT MOD MDM: CPT | Mod: 25

## 2024-02-08 PROCEDURE — 71275 CT ANGIOGRAPHY CHEST: CPT | Mod: 26,MA

## 2024-02-08 PROCEDURE — 85730 THROMBOPLASTIN TIME PARTIAL: CPT

## 2024-02-08 PROCEDURE — 83880 ASSAY OF NATRIURETIC PEPTIDE: CPT

## 2024-02-08 PROCEDURE — 71275 CT ANGIOGRAPHY CHEST: CPT | Mod: MA

## 2024-02-08 PROCEDURE — 71045 X-RAY EXAM CHEST 1 VIEW: CPT | Mod: 26

## 2024-02-08 PROCEDURE — 0225U NFCT DS DNA&RNA 21 SARSCOV2: CPT

## 2024-02-08 PROCEDURE — 85025 COMPLETE CBC W/AUTO DIFF WBC: CPT

## 2024-02-08 PROCEDURE — 85610 PROTHROMBIN TIME: CPT

## 2024-02-08 PROCEDURE — 36415 COLL VENOUS BLD VENIPUNCTURE: CPT

## 2024-02-08 PROCEDURE — 80053 COMPREHEN METABOLIC PANEL: CPT

## 2024-02-08 PROCEDURE — 99285 EMERGENCY DEPT VISIT HI MDM: CPT

## 2024-02-08 PROCEDURE — 96374 THER/PROPH/DIAG INJ IV PUSH: CPT | Mod: XU

## 2024-02-08 PROCEDURE — 84484 ASSAY OF TROPONIN QUANT: CPT

## 2024-02-08 PROCEDURE — 71045 X-RAY EXAM CHEST 1 VIEW: CPT

## 2024-02-08 RX ORDER — ONDANSETRON 8 MG/1
4 TABLET, FILM COATED ORAL ONCE
Refills: 0 | Status: COMPLETED | OUTPATIENT
Start: 2024-02-08 | End: 2024-02-08

## 2024-02-08 RX ADMIN — ONDANSETRON 4 MILLIGRAM(S): 8 TABLET, FILM COATED ORAL at 18:30

## 2024-02-08 NOTE — ED PROVIDER NOTE - OBJECTIVE STATEMENT
48F PMH DVT/PE (adherent to eliquis, had hypercoagulable testing - reports normal results), subsequent hysterectomy, p/w SOB/CP/cough. States she has baseline SOB but feels worse SOB x3days, worse w/ exertion. Also cough x3 days. Also intermittent b/l CP. Felt "unwell" 3d ago but has not felt that way since then. No other systemic symptoms. States that when she had PE she p/w cough/SOB.   Denies f/c, rhinorrhea, sore throat, NVD, abd pain, urinary complaints, LE pain/swelling, recent travel/immobilization.

## 2024-02-08 NOTE — ED PROVIDER NOTE - PATIENT PORTAL LINK FT
You can access the FollowMyHealth Patient Portal offered by Health system by registering at the following website: [email or web address]. By joining [employer]’s FollowMyHealth portal, you will also be able to view your health information using other applications (apps) compatible with our system.

## 2024-02-08 NOTE — ED ADULT NURSE NOTE - NSFALLUNIVINTERV_ED_ALL_ED
Bed/Stretcher in lowest position, wheels locked, appropriate side rails in place/Call bell, personal items and telephone in reach/Instruct patient to call for assistance before getting out of bed/chair/stretcher/Non-slip footwear applied when patient is off stretcher/[place] to call system/Physically safe environment - no spills, clutter or unnecessary equipment/Purposeful proactive rounding/Room/bathroom lighting operational, light cord in reach

## 2024-02-08 NOTE — ED ADULT NURSE NOTE - OBJECTIVE STATEMENT
47 y/o F hx DVT/PE, currently on eliquis, hysterecomy. C/o Cough and increased SOB with exertion since 02/05/24, dry cough, intermittent chest pain b/l since 02/05/24. Pt denies fevers, chills, abdominal pain, N/V/D, hematuria, dysuria, bloody stool, numbness, tingling. PT A&Ox4, respirations even and unlabored, skin color WDL warm and dry, pt is ambulatory with a steady gait. No acute distress observed.

## 2024-02-08 NOTE — ED ADULT TRIAGE NOTE - RESPIRATORY RATE (BREATHS/MIN)
Returned patient's call. Patient states all of his electronic were hacked and he didn't have access to his phone. He states that this is is why he's missed recent appts. Reviewed my concern that his personality, the way he's interacting with our clinic and his behavior has changed dramatically in the last few months. He's had 7 or more NS appts. Discussed that he always blames his behavior on his phone or his housemates and does not seem to be willing to take responsibility for managing his appts or attending therapies. He said he didn't know why.     Regarding opiate withdrawal issues, he is starting to feel better. He will ask the group home nurse to give him the Clonidine that was prescribed. We will change his upcoming appt to 9/29 for clinic appt.     CARLOS A Bass, NP-C  [facility]         22

## 2024-02-08 NOTE — ED ADULT NURSE NOTE - CAS DISCH CONDITION
PT A&Ox4, respirations even and unlabored, skin color WDL warm and dry, pt is ambulatory with a steady gait. No acute distress observed.  EKG completed at triage/Improved

## 2024-02-08 NOTE — ED PROVIDER NOTE - NSFOLLOWUPINSTRUCTIONS_ED_ALL_ED_FT
Can take tylenol 650mg every 6hrs as needed for pain or fever.    Stay well hydrated.    Return for persistent fevers, persistent vomit, uncontrolled pain, worsening breathing, worsening lightheaded.    Follow up with primary doctor within 1-2 days.     Viral Respiratory Infection    A viral respiratory infection is an illness that affects parts of the body used for breathing, like the lungs, nose, and throat. It is caused by a germ called a virus. Symptoms can include runny nose, coughing, sneezing, fatigue, body aches, sore throat, fever, or headache. Over the counter medicine can be used to manage the symptoms but the infection typically goes away on its own in 5 to 10 days.     SEEK IMMEDIATE MEDICAL CARE IF YOU HAVE ANY OF THE FOLLOWING SYMPTOMS: shortness of breath, chest pain, fever over 10 days, or lightheadedness/dizziness.     Shortness of breath    Shortness of breath (dyspnea) means you have trouble breathing and could indicate a medical problem. Causes include lung disease, heart disease, low amount of red blood cells (anemia), poor physical fitness, being overweight, smoking, etc. Your health care provider today may not be able to find a cause for your shortness of breath after your exam. In this case, it is important to have a follow-up exam with your primary care physician as instructed. If medicines were prescribed, take them as directed for the full length of time directed. Refrain from tobacco products.    SEEK IMMEDIATE MEDICAL CARE IF YOU HAVE ANY OF THE FOLLOWING SYMPTOMS: worsening shortness of breath, chest pain, back pain, abdominal pain, fever, coughing up blood, lightheadedness/dizziness.

## 2024-02-08 NOTE — ED PROVIDER NOTE - PROGRESS NOTE DETAILS
[place]: Na 134, other labs grossly wnl. Flu+. Symptoms >48hrs, no indication for tamiflu. CT showed "IMPRESSION: No acute pulmonary embolism. Minimal thin residual of chronic pulmonary emboli, improved since prior." Pt remains very well appearing, no tachypnea.   Discussed importance of outpt follow up and return precautions. Clinically no indication for further emergent ED workup or hospitalization at this time. Stable for dc, outpt f/u.

## 2024-02-08 NOTE — ED PROVIDER NOTE - CLINICAL SUMMARY MEDICAL DECISION MAKING FREE TEXT BOX
48F PMH DVT/PE (adherent to eliquis, had hypercoagulable testing - reports normal results), subsequent hysterectomy, p/w SOB/CP/cough. States she has baseline SOB but feels worse SOB x3days, worse w/ exertion. Also cough x3 days. Also intermittent b/l CP. Felt "unwell" 3d ago but has not felt that way since then. No other systemic symptoms. States that when she had PE she p/w cough/SOB.   Vitals wnl, exam as above.  ddx: Likely URI vs. less likely PE vs. less likely atypical ACS.   Labs, CT, reassess.

## 2024-02-12 ENCOUNTER — NON-APPOINTMENT (OUTPATIENT)
Age: 49
End: 2024-02-12

## 2024-02-13 ENCOUNTER — NON-APPOINTMENT (OUTPATIENT)
Age: 49
End: 2024-02-13

## 2024-02-14 ENCOUNTER — TRANSCRIPTION ENCOUNTER (OUTPATIENT)
Age: 49
End: 2024-02-14

## 2024-02-14 ENCOUNTER — NON-APPOINTMENT (OUTPATIENT)
Age: 49
End: 2024-02-14

## 2024-02-16 ENCOUNTER — APPOINTMENT (OUTPATIENT)
Dept: HEMATOLOGY ONCOLOGY | Facility: CLINIC | Age: 49
End: 2024-02-16

## 2024-02-28 ENCOUNTER — APPOINTMENT (OUTPATIENT)
Dept: DERMATOLOGY | Facility: CLINIC | Age: 49
End: 2024-02-28
Payer: COMMERCIAL

## 2024-02-28 DIAGNOSIS — L81.9 DISORDER OF PIGMENTATION, UNSPECIFIED: ICD-10-CM

## 2024-02-28 DIAGNOSIS — L70.0 ACNE VULGARIS: ICD-10-CM

## 2024-02-28 DIAGNOSIS — L20.89 OTHER ATOPIC DERMATITIS: ICD-10-CM

## 2024-02-28 PROCEDURE — 99214 OFFICE O/P EST MOD 30 MIN: CPT

## 2024-02-28 RX ORDER — TRANEXAMIC ACID
POWDER (GRAM) MISCELLANEOUS
Qty: 30 | Refills: 4 | Status: ACTIVE | COMMUNITY
Start: 2024-02-28 | End: 1900-01-01

## 2024-02-28 RX ORDER — TAPINAROF 10 MG/1000MG
1 CREAM TOPICAL
Qty: 1 | Refills: 5 | Status: DISCONTINUED | COMMUNITY
Start: 2023-08-02 | End: 2024-02-28

## 2024-02-28 RX ORDER — RUXOLITINIB 15 MG/G
1.5 CREAM TOPICAL
Qty: 1 | Refills: 2 | Status: DISCONTINUED | COMMUNITY
Start: 2022-11-16 | End: 2024-02-28

## 2024-02-28 RX ORDER — TRETINOIN 0.25 MG/G
0.03 CREAM TOPICAL
Qty: 1 | Refills: 11 | Status: ACTIVE | COMMUNITY
Start: 2022-09-08 | End: 1900-01-01

## 2024-02-28 RX ORDER — MOMETASONE FUROATE 1 MG/G
0.1 OINTMENT TOPICAL TWICE DAILY
Qty: 1 | Refills: 4 | Status: ACTIVE | COMMUNITY
Start: 2024-02-28 | End: 1900-01-01

## 2024-02-28 RX ORDER — CLOBETASOL PROPIONATE 0.5 MG/G
0.05 OINTMENT TOPICAL
Qty: 1 | Refills: 2 | Status: ACTIVE | COMMUNITY
Start: 2022-08-11 | End: 1900-01-01

## 2024-02-28 RX ORDER — AZELAIC ACID 0.15 G/G
15 GEL TOPICAL
Qty: 1 | Refills: 11 | Status: ACTIVE | COMMUNITY
Start: 2022-09-08 | End: 1900-01-01

## 2024-02-28 NOTE — HISTORY OF PRESENT ILLNESS
[FreeTextEntry1] : acne, eczema [de-identified] : acne doing well on tret/azelaic  eczema under pretty good control with the dupixent still some areas active and dark spots uses clobetasol sparingly

## 2024-02-28 NOTE — ASSESSMENT
[FreeTextEntry1] : acne c/w finacea/tretinoin  eczema still flaring on legs occ try to use mometasone instead of clobetasol also with PIH - start tranexamic acid compounded c/w Dupixent as BSA and severity have decreased with utilization of Dupixent

## 2024-03-20 ENCOUNTER — APPOINTMENT (OUTPATIENT)
Dept: CARDIOLOGY | Facility: CLINIC | Age: 49
End: 2024-03-20
Payer: COMMERCIAL

## 2024-03-20 ENCOUNTER — NON-APPOINTMENT (OUTPATIENT)
Age: 49
End: 2024-03-20

## 2024-03-20 VITALS
OXYGEN SATURATION: 99 % | HEIGHT: 67 IN | WEIGHT: 234 LBS | HEART RATE: 86 BPM | SYSTOLIC BLOOD PRESSURE: 123 MMHG | DIASTOLIC BLOOD PRESSURE: 80 MMHG | BODY MASS INDEX: 36.73 KG/M2

## 2024-03-20 DIAGNOSIS — Z79.01 ENCOUNTER FOR THERAPEUTIC DRUG LVL MONITORING: ICD-10-CM

## 2024-03-20 DIAGNOSIS — Z51.81 ENCOUNTER FOR THERAPEUTIC DRUG LVL MONITORING: ICD-10-CM

## 2024-03-20 PROCEDURE — 36415 COLL VENOUS BLD VENIPUNCTURE: CPT

## 2024-03-20 PROCEDURE — 93000 ELECTROCARDIOGRAM COMPLETE: CPT

## 2024-03-20 PROCEDURE — 99204 OFFICE O/P NEW MOD 45 MIN: CPT

## 2024-03-20 PROCEDURE — G2211 COMPLEX E/M VISIT ADD ON: CPT

## 2024-03-22 LAB
ALBUMIN SERPL ELPH-MCNC: 4.1 G/DL
ALP BLD-CCNC: 106 U/L
ALT SERPL-CCNC: 11 U/L
ANION GAP SERPL CALC-SCNC: 14 MMOL/L
AST SERPL-CCNC: 12 U/L
BASOPHILS # BLD AUTO: 0.05 K/UL
BASOPHILS NFR BLD AUTO: 0.8 %
BILIRUB SERPL-MCNC: 0.3 MG/DL
BUN SERPL-MCNC: 10 MG/DL
CALCIUM SERPL-MCNC: 9 MG/DL
CHLORIDE SERPL-SCNC: 101 MMOL/L
CHOLEST SERPL-MCNC: 208 MG/DL
CO2 SERPL-SCNC: 23 MMOL/L
CREAT SERPL-MCNC: 0.73 MG/DL
DEPRECATED D DIMER PPP IA-ACNC: 170 NG/ML DDU
EGFR: 103 ML/MIN/1.73M2
EOSINOPHIL # BLD AUTO: 0.16 K/UL
EOSINOPHIL NFR BLD AUTO: 2.6 %
ESTIMATED AVERAGE GLUCOSE: 148 MG/DL
GLUCOSE SERPL-MCNC: 93 MG/DL
HBA1C MFR BLD HPLC: 6.8 %
HCT VFR BLD CALC: 39.8 %
HDLC SERPL-MCNC: 60 MG/DL
HGB BLD-MCNC: 12.6 G/DL
IMM GRANULOCYTES NFR BLD AUTO: 0.3 %
LDLC SERPL CALC-MCNC: 135 MG/DL
LYMPHOCYTES # BLD AUTO: 1.95 K/UL
LYMPHOCYTES NFR BLD AUTO: 31.1 %
MAN DIFF?: NORMAL
MCHC RBC-ENTMCNC: 27.3 PG
MCHC RBC-ENTMCNC: 31.7 GM/DL
MCV RBC AUTO: 86.1 FL
MONOCYTES # BLD AUTO: 0.34 K/UL
MONOCYTES NFR BLD AUTO: 5.4 %
NEUTROPHILS # BLD AUTO: 3.75 K/UL
NEUTROPHILS NFR BLD AUTO: 59.8 %
NONHDLC SERPL-MCNC: 148 MG/DL
PLATELET # BLD AUTO: 419 K/UL
POTASSIUM SERPL-SCNC: 4.9 MMOL/L
PROT SERPL-MCNC: 7.1 G/DL
RBC # BLD: 4.62 M/UL
RBC # FLD: 14.4 %
SODIUM SERPL-SCNC: 137 MMOL/L
TRIGL SERPL-MCNC: 76 MG/DL
TSH SERPL-ACNC: 1.75 UIU/ML
WBC # FLD AUTO: 6.27 K/UL

## 2024-04-01 RX ORDER — DUPILUMAB 300 MG/2ML
300 INJECTION, SOLUTION SUBCUTANEOUS
Qty: 1 | Refills: 11 | Status: ACTIVE | COMMUNITY
Start: 2023-03-02

## 2024-04-03 LAB — HLX MTHFR FINAL REPORT: NORMAL

## 2024-04-09 ENCOUNTER — RESULT REVIEW (OUTPATIENT)
Age: 49
End: 2024-04-09

## 2024-04-09 ENCOUNTER — OUTPATIENT (OUTPATIENT)
Dept: OUTPATIENT SERVICES | Facility: HOSPITAL | Age: 49
LOS: 1 days | End: 2024-04-09
Payer: COMMERCIAL

## 2024-04-09 ENCOUNTER — APPOINTMENT (OUTPATIENT)
Dept: CV DIAGNOSITCS | Facility: HOSPITAL | Age: 49
End: 2024-04-09

## 2024-04-09 DIAGNOSIS — R06.02 SHORTNESS OF BREATH: ICD-10-CM

## 2024-04-09 DIAGNOSIS — Z98.890 OTHER SPECIFIED POSTPROCEDURAL STATES: Chronic | ICD-10-CM

## 2024-04-09 PROCEDURE — 93351 STRESS TTE COMPLETE: CPT | Mod: 26

## 2024-04-09 PROCEDURE — 76376 3D RENDER W/INTRP POSTPROCES: CPT | Mod: 26

## 2024-04-09 PROCEDURE — 93306 TTE W/DOPPLER COMPLETE: CPT | Mod: 26

## 2024-05-20 ENCOUNTER — OUTPATIENT (OUTPATIENT)
Dept: OUTPATIENT SERVICES | Facility: HOSPITAL | Age: 49
LOS: 1 days | Discharge: HOME | End: 2024-05-20

## 2024-05-20 DIAGNOSIS — Z98.890 OTHER SPECIFIED POSTPROCEDURAL STATES: Chronic | ICD-10-CM

## 2024-05-20 DIAGNOSIS — I26.99 OTHER PULMONARY EMBOLISM WITHOUT ACUTE COR PULMONALE: ICD-10-CM

## 2024-05-21 ENCOUNTER — APPOINTMENT (OUTPATIENT)
Dept: INTERNAL MEDICINE | Facility: CLINIC | Age: 49
End: 2024-05-21
Payer: COMMERCIAL

## 2024-05-21 ENCOUNTER — APPOINTMENT (OUTPATIENT)
Dept: OBGYN | Facility: CLINIC | Age: 49
End: 2024-05-21
Payer: COMMERCIAL

## 2024-05-21 VITALS
SYSTOLIC BLOOD PRESSURE: 106 MMHG | WEIGHT: 236 LBS | DIASTOLIC BLOOD PRESSURE: 75 MMHG | BODY MASS INDEX: 37.04 KG/M2 | HEART RATE: 86 BPM | HEIGHT: 67 IN

## 2024-05-21 VITALS
OXYGEN SATURATION: 96 % | DIASTOLIC BLOOD PRESSURE: 74 MMHG | TEMPERATURE: 98.2 F | BODY MASS INDEX: 36.34 KG/M2 | WEIGHT: 232 LBS | HEART RATE: 90 BPM | SYSTOLIC BLOOD PRESSURE: 119 MMHG

## 2024-05-21 DIAGNOSIS — R06.02 SHORTNESS OF BREATH: ICD-10-CM

## 2024-05-21 DIAGNOSIS — B96.89 ACUTE VAGINITIS: ICD-10-CM

## 2024-05-21 DIAGNOSIS — E11.628 TYPE 2 DIABETES MELLITUS WITH OTHER SKIN COMPLICATIONS: ICD-10-CM

## 2024-05-21 DIAGNOSIS — Z87.42 PERSONAL HISTORY OF OTHER DISEASES OF THE FEMALE GENITAL TRACT: ICD-10-CM

## 2024-05-21 DIAGNOSIS — R10.2 PELVIC AND PERINEAL PAIN: ICD-10-CM

## 2024-05-21 DIAGNOSIS — R23.2 FLUSHING: ICD-10-CM

## 2024-05-21 DIAGNOSIS — N76.0 ACUTE VAGINITIS: ICD-10-CM

## 2024-05-21 DIAGNOSIS — Z86.19 PERSONAL HISTORY OF OTHER INFECTIOUS AND PARASITIC DISEASES: ICD-10-CM

## 2024-05-21 DIAGNOSIS — Z00.00 ENCOUNTER FOR GENERAL ADULT MEDICAL EXAMINATION W/OUT ABNORMAL FINDINGS: ICD-10-CM

## 2024-05-21 DIAGNOSIS — Z86.018 PERSONAL HISTORY OF OTHER BENIGN NEOPLASM: ICD-10-CM

## 2024-05-21 PROCEDURE — 99214 OFFICE O/P EST MOD 30 MIN: CPT

## 2024-05-21 PROCEDURE — 99386 PREV VISIT NEW AGE 40-64: CPT

## 2024-05-21 RX ORDER — DEXTROAMPHETAMINE SACCHARATE, AMPHETAMINE ASPARTATE, DEXTROAMPHETAMINE SULFATE, AND AMPHETAMINE SULFATE 2.5; 2.5; 2.5; 2.5 MG/1; MG/1; MG/1; MG/1
10 TABLET ORAL
Refills: 0 | Status: ACTIVE | COMMUNITY

## 2024-05-21 RX ORDER — CLOTRIMAZOLE AND BETAMETHASONE DIPROPIONATE 10; .5 MG/G; MG/G
1-0.05 CREAM TOPICAL TWICE DAILY
Qty: 1 | Refills: 3 | Status: DISCONTINUED | COMMUNITY
Start: 2023-06-21 | End: 2024-05-21

## 2024-05-21 RX ORDER — ESCITALOPRAM OXALATE 10 MG/1
10 TABLET, FILM COATED ORAL
Refills: 0 | Status: ACTIVE | COMMUNITY

## 2024-05-21 RX ORDER — SODIUM SULFATE, POTASSIUM SULFATE AND MAGNESIUM SULFATE 1.6; 3.13; 17.5 G/177ML; G/177ML; G/177ML
17.5-3.13-1.6 SOLUTION ORAL
Qty: 1 | Refills: 0 | Status: DISCONTINUED | COMMUNITY
Start: 2023-06-22 | End: 2024-05-21

## 2024-05-21 NOTE — HISTORY OF PRESENT ILLNESS
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  TSH collected back on March 20th= 1.75.

## 2024-05-21 NOTE — HEALTH RISK ASSESSMENT
[Good] : ~his/her~  mood as  good [No] : No [0] : 2) Feeling down, depressed, or hopeless: Not at all (0) [PHQ-2 Negative - No further assessment needed] : PHQ-2 Negative - No further assessment needed [None] : None [With Family] : lives with family [Single] : single [Fully functional (bathing, dressing, toileting, transferring, walking, feeding)] : Fully functional (bathing, dressing, toileting, transferring, walking, feeding) [Fully functional (using the telephone, shopping, preparing meals, housekeeping, doing laundry, using] : Fully functional and needs no help or supervision to perform IADLs (using the telephone, shopping, preparing meals, housekeeping, doing laundry, using transportation, managing medications and managing finances) [Smoke Detector] : smoke detector [Safety elements used in home] : safety elements used in home [Seat Belt] :  uses seat belt [Never] : Never [de-identified] : yes [de-identified] : not veg but moving towards that [LIS0Ptmed] : 0 [Change in mental status noted] : No change in mental status noted [Language] : denies difficulty with language [Sexually Active] : not sexually active [High Risk Behavior] : no high risk behavior [Reports changes in hearing] : Reports no changes in hearing [Reports changes in vision] : Reports no changes in vision [Reports changes in dental health] : Reports no changes in dental health

## 2024-05-21 NOTE — HISTORY OF PRESENT ILLNESS
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 She had been experiencing lower extremity pain and swelling, in addition to angioedema. She was seen at urgent care initially, was given an inhaler. She traveled to [place] and was using her albuterol round-the-clock without improvement. When she returns. She was evaluated in the emergency room, was found to have DVT and multiple bilateral pulmonary emboli. Patient was extensively worked up. She has a family history of a father and paternal aunt who both had postoperative clots. She had a CT of the abdomen which showed a uterine mass, fibroids. She underwent a uterine embolization in March 2022 but said that it did not work.  She underwent Hematology evaluation with hypercoagulable work-up which was negative. . Review of hypercoagulable testing otherwise unremarkable.  She herself had no other history of clots, 1 miscarriage years ago, 2 healthy pregnancies without events, dgtr is 15, son 21.  She was treated with Eliquis for about a year and a half. She stopped it on her own in December 2022. Patient again started noticing dyspnea with exertion. She thought perhaps it was weight related, though then realized that she had been the same weight previously at times and did not feel short of breath. She went back to [facility]  had a CTA which did not show acute PE but showed evidence of chronic pulmonary emboli. Echocardiogram was normal. Patient was restarted on Eliquis, completed the load and is now taking 5 mg twice a day since then.    Had Pulmonary evaluation that included unremarkable PFTs. No history of CTEPH. (Chronic thromboembolic pulm htn) Had Stress echocardiogram which was nl but poor exercise capacity.  6/23 Gyn surg Hysterectomy (ovaries and cervix still in) for endometrial mass which was adenomyosis. Dr. Majano Now seeing Shi Kay NP Having severe menopausal sxs hot flashes, poor sleep, wt gain- not candidate for MHT due to ongoing VTE but may start Jared. Would consider Fezolinitant.  [place] 8/23 nl Mammo 8/23 nl Vax: uptodate, tdap within 10 yrs at [facility] after injury  PRISCA BAILON,ARYEL  Internal Medicine [phone] prior PCP  Recent dx DM2 had been preDM HLD, obesity, ADD ,/anx, sees Psych is on Jaun and Adderall. Had angioedema to Wellbutrin.  Obesity: states about 4 yrs ago her wt was 175-180, now 232 BMI 36. Does tend to snack on chips and now making efforts to eat more fruits/veggies and do exercise. 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

## 2024-05-21 NOTE — ASSESSMENT
[FreeTextEntry1] : Pt as outlined. VTE disease of unclear etiol although there may be some family h/o same.  She did not have Covid although had strep before events. She did have Covid vax Pfizer before the events as well.  Newly diabetic- will start GLP1 if covered. Send Ozempic and work up the dose. Will need f/u A1c in 2 mos.  f/u by phone after straryting Ozempic.

## 2024-05-21 NOTE — PLAN
[FreeTextEntry1] : Due to medical hx, pt informed that she is not a candidate for hormonal therapy Pt to read literature from the [facility] regarding non-hormonal approaches to vasomotor symptoms Will schedule a TEB on May 23rd to review and establish a treatment plan- may consider check of FSH/Estradiol

## 2024-05-23 ENCOUNTER — APPOINTMENT (OUTPATIENT)
Dept: OBGYN | Facility: CLINIC | Age: 49
End: 2024-05-23

## 2024-05-24 ENCOUNTER — RESULT REVIEW (OUTPATIENT)
Age: 49
End: 2024-05-24

## 2024-05-24 ENCOUNTER — APPOINTMENT (OUTPATIENT)
Dept: HEMATOLOGY ONCOLOGY | Facility: CLINIC | Age: 49
End: 2024-05-24

## 2024-05-24 ENCOUNTER — APPOINTMENT (OUTPATIENT)
Dept: HEMATOLOGY ONCOLOGY | Facility: CLINIC | Age: 49
End: 2024-05-24
Payer: COMMERCIAL

## 2024-05-24 VITALS
SYSTOLIC BLOOD PRESSURE: 118 MMHG | RESPIRATION RATE: 16 BRPM | DIASTOLIC BLOOD PRESSURE: 74 MMHG | HEART RATE: 80 BPM | WEIGHT: 232.58 LBS | BODY MASS INDEX: 36.43 KG/M2 | OXYGEN SATURATION: 99 % | TEMPERATURE: 97.2 F

## 2024-05-24 DIAGNOSIS — Z71.89 OTHER SPECIFIED COUNSELING: ICD-10-CM

## 2024-05-24 DIAGNOSIS — I26.99 OTHER PULMONARY EMBOLISM W/OUT ACUTE COR PULMONALE: ICD-10-CM

## 2024-05-24 DIAGNOSIS — I82.409 ACUTE EMBOLISM AND THROMBOSIS OF UNSPECIFIED DEEP VEINS OF UNSPECIFIED LOWER EXTREMITY: ICD-10-CM

## 2024-05-24 LAB
BASOPHILS # BLD AUTO: 0.06 K/UL — SIGNIFICANT CHANGE UP (ref 0–0.2)
BASOPHILS NFR BLD AUTO: 0.8 % — SIGNIFICANT CHANGE UP (ref 0–2)
EOSINOPHIL # BLD AUTO: 0.1 K/UL — SIGNIFICANT CHANGE UP (ref 0–0.5)
EOSINOPHIL NFR BLD AUTO: 1.4 % — SIGNIFICANT CHANGE UP (ref 0–6)
HBA1C TO ESTIMATED AVERAGE GLUCOSE CONVERTER - CONVERTED GLUCOSE: 5.1
HCT VFR BLD CALC: 39 % — SIGNIFICANT CHANGE UP (ref 34.5–45)
HGB BLD-MCNC: 12.8 G/DL — SIGNIFICANT CHANGE UP (ref 11.5–15.5)
IMM GRANULOCYTES NFR BLD AUTO: 0.3 % — SIGNIFICANT CHANGE UP (ref 0–0.9)
LYMPHOCYTES # BLD AUTO: 2.17 K/UL — SIGNIFICANT CHANGE UP (ref 1–3.3)
LYMPHOCYTES # BLD AUTO: 30.4 % — SIGNIFICANT CHANGE UP (ref 13–44)
MCHC RBC-ENTMCNC: 28 PG — SIGNIFICANT CHANGE UP (ref 27–34)
MCHC RBC-ENTMCNC: 32.8 G/DL — SIGNIFICANT CHANGE UP (ref 32–36)
MCV RBC AUTO: 85.3 FL — SIGNIFICANT CHANGE UP (ref 80–100)
MONOCYTES # BLD AUTO: 0.48 K/UL — SIGNIFICANT CHANGE UP (ref 0–0.9)
MONOCYTES NFR BLD AUTO: 6.7 % — SIGNIFICANT CHANGE UP (ref 2–14)
NEUTROPHILS # BLD AUTO: 4.31 K/UL — SIGNIFICANT CHANGE UP (ref 1.8–7.4)
NEUTROPHILS NFR BLD AUTO: 60.4 % — SIGNIFICANT CHANGE UP (ref 43–77)
NRBC # BLD: 0 /100 WBCS — SIGNIFICANT CHANGE UP (ref 0–0)
PLATELET # BLD AUTO: 417 K/UL — HIGH (ref 150–400)
RBC # BLD: 4.57 M/UL — SIGNIFICANT CHANGE UP (ref 3.8–5.2)
RBC # FLD: 13.5 % — SIGNIFICANT CHANGE UP (ref 10.3–14.5)
WBC # BLD: 7.14 K/UL — SIGNIFICANT CHANGE UP (ref 3.8–10.5)
WBC # FLD AUTO: 7.14 K/UL — SIGNIFICANT CHANGE UP (ref 3.8–10.5)

## 2024-05-24 PROCEDURE — 99214 OFFICE O/P EST MOD 30 MIN: CPT

## 2024-05-24 RX ORDER — PROPRANOLOL HYDROCHLORIDE 10 MG/1
10 TABLET ORAL
Qty: 60 | Refills: 0 | Status: ACTIVE | COMMUNITY
Start: 2024-03-25

## 2024-05-24 RX ORDER — APIXABAN 5 MG/1
5 TABLET, FILM COATED ORAL
Qty: 60 | Refills: 10 | Status: ACTIVE | COMMUNITY
Start: 2023-06-01 | End: 1900-01-01

## 2024-05-24 RX ORDER — FLUTICASONE PROPIONATE 50 UG/1
50 SPRAY, METERED NASAL
Qty: 16 | Refills: 0 | Status: ACTIVE | COMMUNITY
Start: 2024-04-14

## 2024-05-24 RX ORDER — POLYMYXIN B SULFATE AND TRIMETHOPRIM 10000; 1 [USP'U]/ML; MG/ML
10000-0.1 SOLUTION OPHTHALMIC
Qty: 10 | Refills: 0 | Status: ACTIVE | COMMUNITY
Start: 2024-04-14

## 2024-05-28 ENCOUNTER — TRANSCRIPTION ENCOUNTER (OUTPATIENT)
Age: 49
End: 2024-05-28

## 2024-05-28 LAB
ALBUMIN SERPL ELPH-MCNC: 4.1 G/DL
ALP BLD-CCNC: 93 U/L
ALT SERPL-CCNC: 21 U/L
ANION GAP SERPL CALC-SCNC: 13 MMOL/L
AST SERPL-CCNC: 17 U/L
BILIRUB SERPL-MCNC: 0.2 MG/DL
BUN SERPL-MCNC: 12 MG/DL
CALCIUM SERPL-MCNC: 9.6 MG/DL
CHLORIDE SERPL-SCNC: 105 MMOL/L
CO2 SERPL-SCNC: 22 MMOL/L
CREAT SERPL-MCNC: 0.78 MG/DL
EGFR: 95 ML/MIN/1.73M2
FERRITIN SERPL-MCNC: 26 NG/ML
GLUCOSE SERPL-MCNC: 96 MG/DL
IRON SATN MFR SERPL: 12 %
IRON SERPL-MCNC: 46 UG/DL
POTASSIUM SERPL-SCNC: 4.6 MMOL/L
PROT SERPL-MCNC: 7 G/DL
SODIUM SERPL-SCNC: 140 MMOL/L
TIBC SERPL-MCNC: 396 UG/DL
UIBC SERPL-MCNC: 350 UG/DL

## 2024-05-28 RX ORDER — BLOOD-GLUCOSE SENSOR
EACH MISCELLANEOUS
Qty: 6 | Refills: 1 | Status: ACTIVE | COMMUNITY
Start: 2024-05-28 | End: 1900-01-01

## 2024-05-28 RX ORDER — BLOOD-GLUCOSE,RECEIVER,CONT
EACH MISCELLANEOUS
Qty: 1 | Refills: 0 | Status: ACTIVE | COMMUNITY
Start: 2024-05-28 | End: 1900-01-01

## 2024-05-28 NOTE — ASSESSMENT
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 Will repeat it today. Patient will need anticoagulation for life. Patient Eliquis 5 mg BID Hypercoagulable work up was negative.   S/P hysterectomy on 6/30/23. Patient feels well except for sob with exertion. Feels fatigue at time.  5/24/24- WBC 7.1, Hb 12.8 g/dl, hct 39%, MCV 85.3, .   3- ROSA: . Iron studies were done today will call patient with results.  Patient had a colonoscopy on 8/2023, reports was normal.   Greater than 50% of the encounter time was spent on counseling and coordination of care for PE/DVT and I have spent 40 minutes of face to face time with the patient.  RTC 6 months.

## 2024-05-28 NOTE — ADDENDUM
[FreeTextEntry1] : Patient's iron was low, recommended patient starting Ferrous sulfate 325 mg daily. LVM.

## 2024-05-28 NOTE — HISTORY OF PRESENT ILLNESS
[0 - No Distress] : Distress Level: 0 [90: Able to carry normal activity; minor signs or symptoms of disease.] : 90: Able to carry normal activity; minor signs or symptoms of disease.  [de-identified] : 46 yo F w/ sig PmHx p/w ~3 months of intermittent partial lip swelling, leg swelling, nonproductive cough, and SIERRA found to have DVT PE \par  - pt found to have Acute above-the-knee left popliteal vein thrombus.\par  - Multiple bilateral segmental and subsegmental pulmonary emboli. Ventricular septal flattening may represent right heart strain\par  -ct a/p with *  Dominant pelvic mass centered on the endometrium, concerning for endometrial malignancy. Lesion appears to invade the myometrium. No evidence of parametrial spread. No evidence of metastatic disease or suspicious lymphadenopathy in the abdomen and pelvis.\par  Patient had the first PE on 2021, was treated with Eliquis until 12/2022, when she self discontinue anticoagulation.  The most recurrent PE was on 4/2023. Reports family history in her paternal aunt, on long term coumadin. \par  \par  Endometrial Mass- scheduled for hysterectomy on 6/30/23. Needs hematology clearance. \par  \par  anemia \par  - mild microcytic anemia \par  -ferritin in mildly low range, can receive out pt IV iron post completion of menstrual period\par  -start oral iron ferrous sulfate 325 mg once daily\par  \par  \par  \par  \par   [de-identified] : S/P hysterectomy on 6/30/23. Patient feels well. 5/24/24- WBC 7.1, Hb 12.8 g/dl, hct 39%, MCV 85.3, .   No other changes in medical, surgical or social history since 11/10/23.

## 2024-05-29 RX ORDER — SEMAGLUTIDE 0.68 MG/ML
2 INJECTION, SOLUTION SUBCUTANEOUS
Qty: 4 | Refills: 3 | Status: ACTIVE | COMMUNITY
Start: 2024-05-21 | End: 1900-01-01

## 2024-05-30 ENCOUNTER — APPOINTMENT (OUTPATIENT)
Dept: OBGYN | Facility: CLINIC | Age: 49
End: 2024-05-30
Payer: COMMERCIAL

## 2024-05-30 PROCEDURE — 99213 OFFICE O/P EST LOW 20 MIN: CPT

## 2024-05-30 RX ORDER — GABAPENTIN 300 MG/1
300 TABLET ORAL AT BEDTIME
Qty: 30 | Refills: 0 | Status: ACTIVE | COMMUNITY
Start: 2024-05-30 | End: 1900-01-01

## 2024-05-30 RX ORDER — GUANFACINE 2 MG/1
2 TABLET, EXTENDED RELEASE ORAL
Qty: 30 | Refills: 0 | Status: DISCONTINUED | COMMUNITY
Start: 2024-03-25 | End: 2024-05-30

## 2024-05-30 RX ORDER — GABAPENTIN 100 MG/1
100 CAPSULE ORAL
Qty: 30 | Refills: 0 | Status: ACTIVE | COMMUNITY
Start: 2024-05-30 | End: 1900-01-01

## 2024-06-18 ENCOUNTER — EMERGENCY (EMERGENCY)
Facility: HOSPITAL | Age: 49
LOS: 1 days | Discharge: HOME | End: 2024-06-18
Attending: EMERGENCY MEDICINE | Admitting: EMERGENCY MEDICINE
Payer: COMMERCIAL

## 2024-06-18 VITALS
SYSTOLIC BLOOD PRESSURE: 101 MMHG | DIASTOLIC BLOOD PRESSURE: 78 MMHG | HEART RATE: 78 BPM | RESPIRATION RATE: 16 BRPM | OXYGEN SATURATION: 100 % | TEMPERATURE: 98 F

## 2024-06-18 VITALS
HEART RATE: 86 BPM | OXYGEN SATURATION: 100 % | TEMPERATURE: 98 F | RESPIRATION RATE: 18 BRPM | DIASTOLIC BLOOD PRESSURE: 75 MMHG | SYSTOLIC BLOOD PRESSURE: 123 MMHG | WEIGHT: 233.91 LBS

## 2024-06-18 DIAGNOSIS — Z98.890 OTHER SPECIFIED POSTPROCEDURAL STATES: Chronic | ICD-10-CM

## 2024-06-18 PROCEDURE — 73590 X-RAY EXAM OF LOWER LEG: CPT | Mod: 26,RT

## 2024-06-18 PROCEDURE — 73552 X-RAY EXAM OF FEMUR 2/>: CPT | Mod: 26,RT

## 2024-06-18 PROCEDURE — 73562 X-RAY EXAM OF KNEE 3: CPT | Mod: 26,RT

## 2024-06-18 PROCEDURE — 99284 EMERGENCY DEPT VISIT MOD MDM: CPT

## 2024-06-18 PROCEDURE — 73502 X-RAY EXAM HIP UNI 2-3 VIEWS: CPT | Mod: 26,RT

## 2024-06-18 RX ORDER — OXYCODONE HYDROCHLORIDE 5 MG/1
5 TABLET ORAL ONCE
Refills: 0 | Status: DISCONTINUED | OUTPATIENT
Start: 2024-06-18 | End: 2024-06-18

## 2024-06-18 RX ORDER — IBUPROFEN 200 MG
400 TABLET ORAL ONCE
Refills: 0 | Status: COMPLETED | OUTPATIENT
Start: 2024-06-18 | End: 2024-06-18

## 2024-06-18 RX ORDER — ACETAMINOPHEN 500 MG
975 TABLET ORAL ONCE
Refills: 0 | Status: COMPLETED | OUTPATIENT
Start: 2024-06-18 | End: 2024-06-18

## 2024-06-18 RX ADMIN — Medication 975 MILLIGRAM(S): at 08:18

## 2024-06-18 RX ADMIN — OXYCODONE HYDROCHLORIDE 5 MILLIGRAM(S): 5 TABLET ORAL at 08:52

## 2024-06-18 RX ADMIN — Medication 400 MILLIGRAM(S): at 08:19

## 2024-06-18 NOTE — ED PROVIDER NOTE - PATIENT PORTAL LINK FT
You can access the FollowMyHealth Patient Portal offered by  by registering at the following website: [email or web address]. By joining [facility]’s FollowMyHealth portal, you will also be able to view your health information using other applications (apps) compatible with our system.

## 2024-06-18 NOTE — ED PROVIDER NOTE - CLINICAL SUMMARY MEDICAL DECISION MAKING FREE TEXT BOX
48 y hx DVT eliquis DM here for mechanical fall and R knee pain, with no limitiations to ROM and no overlying swelling or skin changes, neurovascularly intact, xrays although likely just strain, or tendonitis, ligamentous injury, will refer to orthopedics for further evaluation. 48 y hx DVT eliquis DM here for mechanical fall and R knee pain, with no limitiations to ROM and no overlying swelling or skin changes, neurovascularly intact, xrays although likely strain, or tendonitis, ligamentous injury, will refer to orthopedics for further evaluation.

## 2024-06-18 NOTE — ED ADULT NURSE NOTE - NS ED NURSE RECORD ANOTHER VITAL SIGN
Internal Medicine History & Physical      Chief Complaint:  RLE cellulitis. LE edema    Source of history: Patient, medical record.     History Of Present Illness    67 year old male with PMHx of HTN, A.fib, hx of ablation, hypothyroidism, bronchitis, morbid obesity and chronic lower extremity wounds, presented to [facility] for worsening pain to the RLE. Evaluation in ED with acute cellulitis. Pt in pain, with tachycardia/tachypnea. Patient has known chronic wound RLE, that has closed and opened multiple times. Patient was being followed by wound doctor at [facility] previously.  Per pt, wound has been worsening over the past week, he also had more edema, he needs to stand for his job, has been unable to bear weight to his feet over the past several days. He has been keeping the wounds dressed at home with a dry dressing.  Denies any fevers, chills, vomiting. No CP, some SOB d/t his chronic bronchitis, exacerbated in the winter.  No palpitations, no syncope. No abd pain, no dysuria. BC sent in ED. Labs with mild leukocytosis. Started iv Abx, admitted for evaluation.       Review of Systems  Review of Systems          Constitutional symptoms:  No fever, no chills, no sweats.          Eye symptoms:  Negative except as documented in HPI,  .          ENT symptoms:  Negative except as documented in HPI,  .         CV symptoms:  Negative except as documented in HPI,  .         Respiratory symptoms:  Negative except as documented in HPI,  .         Gastrointestinal symptoms:  Negative except as documented in HPI,  .          symptoms:  Negative except as documented in HPI,  .      Musculoskeletal symptoms: Negative except as documented in HPI.         Neurologic symptoms:  Negative except as documented in HPI,  .          Endocrine symptoms:  Negative except as documented in HPI,  .          Allergy symptoms:  Negative except as documented in HPI,  .          All other systems reviewed, otherwise  negative.         Past Medical History  Past Medical History:   Diagnosis Date   • Anxiety    • Bronchitis    • Chronic pain    • Depression     Pt states some depression   • Essential (primary) hypertension    • High cholesterol    • Thyroid condition        Surgical History  Past Surgical History:   Procedure Laterality Date   • Appendectomy     • Cardiac catherization     • Cardiac surgery     • Hernia repair          Social History  Social History     Tobacco Use   • Smoking status: Never Smoker   • Smokeless tobacco: Never Used   Substance Use Topics   • Alcohol use: Not Currently     Frequency: Never   • Drug use: Never       Allergies  ALLERGIES:  Patient has no known allergies.    Home Medications  Medications Prior to Admission   Medication Sig Dispense Refill   • METOPROLOL TARTRATE PO Take by mouth daily.     • LISINOPRIL PO Take by mouth 2 times daily.     • levothyroxine 125 MCG tablet Take 125 mcg by mouth daily. Indications: Underactive Thyroid     • FUROSEMIDE PO Take by mouth daily.     • apixaBAN (Eliquis) 5 MG Tab Take 5 mg by mouth every 12 hours. Indications: Cerebrovascular accident secondary to Atrial Fibrillation     • LOVASTATIN PO Take by mouth daily.         Inpatient Medications  Current Facility-Administered Medications   Medication Dose Route Frequency Provider Last Rate Last Admin   • apixaBAN (ELIQUIS) tablet 5 mg  5 mg Oral 2 times per day Kasie Dickens MD   5 mg at 01/27/21 0924   • levothyroxine (SYNTHROID, LEVOTHROID) tablet 125 mcg  125 mcg Oral QAM AC Kasie Dickens MD   125 mcg at 01/27/21 0924   • lisinopril (ZESTRIL) tablet 2.5 mg  2.5 mg Oral BID Kasie Dickens MD   2.5 mg at 01/27/21 0924   • metoPROLOL tartrate (LOPRESSOR) tablet 25 mg  25 mg Oral 2 times per day Kasie Dickens MD   25 mg at 01/27/21 0924   • pantoprazole (PROTONIX) EC tablet 40 mg  40 mg Oral Nightly Kasie Dickens MD   40 mg at 01/27/21 0304   • ondansetron (ZOFRAN ODT) disintegrating tablet 4  mg  4 mg Oral 4x Daily PRN Kasie Dickens MD       • acetaminophen (TYLENOL) tablet 650 mg  650 mg Oral Q6H PRN Kasie Dickens MD       • traMADol (ULTRAM) tablet 50 mg  50 mg Oral Q6H PRN Kasie Dickens MD   50 mg at 01/27/21 0304   • sodium chloride 0.9 % flush bag 25 mL  25 mL Intravenous PRN Kasie Dickens MD       • sodium chloride (PF) 0.9 % injection 2 mL  2 mL Intracatheter 2 times per day Kasie Dickens MD   2 mL at 01/27/21 0936   • sodium chloride (NORMAL SALINE) 0.9 % bolus 500 mL  500 mL Intravenous PRN Kasie Dickens MD       • potassium CHLORIDE (KLOR-CON M) bev ER tablet 20 mEq  20 mEq Oral Daily with breakfast Kasie Dickens MD   20 mEq at 01/27/21 1324   • clindamycin (CLEOCIN) in dextrose 5% premix IVPB 600 mg  600 mg Intravenous 3 times per day Mark Rodríguez DPM       • piperacillin-tazobactam (ZOSYN) 3.375 g in sodium chloride 0.9 % 100 mL IVPB  3.375 g Intravenous 3 times per day Mark Rodríguez DPM       • [START ON 1/28/2021] furosemide (LASIX INJECT) injection 40 mg  40 mg Intravenous Daily Darrin Latham MD             Physical Exam  Visit Vitals  /76   Pulse 88   Temp 98.8 °F (37.1 °C) (Oral)   Resp 16   Ht 5' 11\" (1.803 m)   Wt (!) 165.7 kg (365 lb 6.4 oz)   SpO2 94%   BMI 50.96 kg/m²     Physical Exam     General: Alert, oriented, NAD, obese  Head:  Normocephalic-atraumatic.   Neck:  Supple. No JVD. No thyroid enlargement  Eyes:  Normal conjunctivae. EOMI.    ENT:  Mucous membranes are moist.   Cardiovascular:  Irregular rate and rhythm  Respiratory:   Clear to auscultation BL.  No wheezes  Gastrointestinal:  Soft, obese, non tender.  Normal bowel sounds.     LE: 2+ edema. No calf tend. RLE wound  Neurologic:  Grossly intact. Oriented times 3.  No focal deficits.  Psychiatric:  Appropriate mood and affect.    Labs   Recent Results (from the past 24 hour(s))   Magnesium    Collection Time: 01/27/21  5:17 AM   Result Value Ref Range    Magnesium 2.5 (H) 1.7 - 2.4 mg/dL    Thyroid Stimulating Hormone Reflex    Collection Time: 01/27/21  5:17 AM   Result Value Ref Range    TSH 3.287 0.350 - 5.000 mcUnits/mL   NT proBNP    Collection Time: 01/27/21  5:17 AM   Result Value Ref Range    NT-proBNP 2,201 (H) <=125 pg/mL   Glycohemoglobin    Collection Time: 01/27/21  5:17 AM   Result Value Ref Range    Hemoglobin A1C 5.8 (H) 4.5 - 5.6 %   Comprehensive Metabolic Panel    Collection Time: 01/27/21  5:17 AM   Result Value Ref Range    Fasting Status      Sodium 139 135 - 145 mmol/L    Potassium 3.6 3.4 - 5.1 mmol/L    Chloride 102 98 - 107 mmol/L    Carbon Dioxide 33 (H) 21 - 32 mmol/L    Anion Gap 8 (L) 10 - 20 mmol/L    Glucose 104 (H) 65 - 99 mg/dL    BUN 18 6 - 20 mg/dL    Creatinine 0.86 0.67 - 1.17 mg/dL    Glomerular Filtration Rate >90 >90 mL/min/1.73m2    BUN/ Creatinine Ratio 21 7 - 25    Calcium 8.9 8.4 - 10.2 mg/dL    Bilirubin, Total 0.7 0.2 - 1.0 mg/dL    GOT/AST 13 <=37 Units/L    GPT/ALT 17 <64 Units/L    Alkaline Phosphatase 68 45 - 117 Units/L    Albumin 2.7 (L) 3.6 - 5.1 g/dL    Protein, Total 6.8 6.4 - 8.2 g/dL    Globulin 4.1 (H) 2.0 - 4.0 g/dL    A/G Ratio 0.7 (L) 1.0 - 2.4   Prothrombin Time    Collection Time: 01/27/21  5:17 AM   Result Value Ref Range    Prothrombin Time 12.6 (H) 9.7 - 11.8 sec    INR 1.2 <=5.0   CBC with Automated Differential (performable only)    Collection Time: 01/27/21  5:17 AM   Result Value Ref Range    WBC 9.3 4.2 - 11.0 K/mcL    RBC 3.37 (L) 4.50 - 5.90 mil/mcL    HGB 10.6 (L) 13.0 - 17.0 g/dL    HCT 34.6 (L) 39.0 - 51.0 %    .7 (H) 78.0 - 100.0 fl    MCH 31.5 26.0 - 34.0 pg    MCHC 30.6 (L) 32.0 - 36.5 g/dL    RDW-CV 15.4 (H) 11.0 - 15.0 %    RDW-SD 58.4 (H) 39.0 - 50.0 fL     140 - 450 K/mcL    NRBC 0 <=0 /100 WBC    Neutrophil, Percent 74 %    Lymphocytes, Percent 13 %    Mono, Percent 12 %    Eosinophils, Percent 1 %    Basophils, Percent 0 %    Immature Granulocytes 0 %    Absolute Neutrophils 6.9 1.8 - 7.7 K/mcL     Absolute Lymphocytes 1.2 1.0 - 4.0 K/mcL    Absolute Monocytes 1.1 (H) 0.3 - 0.9 K/mcL    Absolute Eosinophils  0.1 0.0 - 0.5 K/mcL    Absolute Basophils 0.0 0.0 - 0.3 K/mcL    Absolute Immmature Granulocytes 0.0 0.0 - 0.2 K/mcL   Sedimentation Rate Westergren    Collection Time: 01/27/21  5:17 AM   Result Value Ref Range    RBC Sedimentation Rate 65 (H) 0 - 20 mm/hr   Uric Acid    Collection Time: 01/27/21  5:17 AM   Result Value Ref Range    Uric Acid 9.4 (H) 3.5 - 7.2 mg/dL       Imaging  XR ANKLE MIN 3 VIEWS RIGHT   Final Result       Diffuse soft tissue swelling/cellulitis involving the right lower   extremity and right ankle with mild degenerative changes as described   above.      Possible soft tissue ulcer seen along the posterior aspect of the proximal   calf region.         Electronically Signed by: BOSTON HERRERA DO    Signed on: 1/26/2021 4:39 PM          XR TIBIA FIBULA 2 VIEWS RIGHT   Final Result       Diffuse soft tissue swelling/cellulitis involving the right lower   extremity and right ankle with mild degenerative changes as described   above.      Possible soft tissue ulcer seen along the posterior aspect of the proximal   calf region.         Electronically Signed by: BOSTON HERRERA DO    Signed on: 1/26/2021 4:39 PM          XR CHEST PA OR AP 1 VIEW   Final Result   FINDINGS/IMPRESSION:        The examination is limited due to portable technique and patient's large   body habitus.  No gross infiltrate or effusion or pneumothorax.  There is   mild cardiomegaly and mild pulmonary vascular congestion.  EKG leads   overlying the chest.      Electronically Signed by: CANDIDO BROWN M.D.    Signed on: 1/26/2021 3:14 PM          XR ANKLE MIN 3 VIEWS LEFT    (Results Pending)   US VASC LOWER EXTREMITY ARTERIES DUPLEX BILATERAL    (Results Pending)         Assessment/Plan:    RLE cellulitis. Acute on chronic RLE wound  Admitted with c/o pain, LE swelling, leukocytosis, tachycardia  -WBC 12.3 on  admission, down to 9.3  -Pt afebrile. ESR 65 on admission  -X-rays done, no osseus erosions, pt with chronic leg edema  -per ID, low suspicion of underlying osteomyelitis.  -Blood cx negative so far  -Received vancomycin and ceftriaxone in ED  -Now on IV clinda and zosyn  -Labs overall stable. Cr/lytes unremarkable    Hypertension. BP controled, resumed home meds  EKG with A.fib. echo pending  Cr unremarkable     Atrial Fibrillation. Hx of ablation  LE edema, diastolic CHF?. Echo pending.   HR controled. BP stable. Card consult  Gentle diuresis, monitor labs  No hx of CAD. Trop neg    Hypothyroid. TSH wnl    Anemia: of chronic disease. Monitor Hg  Check Iron panel, B12/Folate    Morbid obesity, educated    Consults   ID, Card, wound PT    Code Status    Code Status: Full Resuscitation      Kasie Dickens MD  1/27/2021 6:29 PM       Yes

## 2024-06-18 NOTE — ED PROVIDER NOTE - PROGRESS NOTE DETAILS
Britany Bolton MD PGY3: XRay shows no fracture or dislocation. Patient placed in knee immobilizer and crutches given. Patient to follow-up with orthopedic surgery.

## 2024-06-18 NOTE — ED PROVIDER NOTE - NSFOLLOWUPCLINICS_GEN_ALL_ED_FT
[facility] Orthopedic Surgery  Orthopedic Surgery  [address], [address]rd & 4th [place] [address]  Phone: [phone]  Fax:

## 2024-06-18 NOTE — ED ADULT NURSE NOTE - NSFALLRISKINTERV_ED_ALL_ED

## 2024-06-18 NOTE — ED PROVIDER NOTE - ATTENDING CONTRIBUTION TO CARE
DR. PROCTOR, ATTENDING MD-  I performed a face to face bedside interview with the patient regarding history of present illness, review of symptoms and past medical history. I completed an independent physical exam.  I have discussed the patient's plan of care with the resident.   Documentation as above in the note.    49 y/o female h/o dvt/pe on eliquis s/p mech fall with r knee and hip pain.  No prior injury to these joints.  Minimal swelling, no skin change, lmtd rom knee d/t pain, distally nv intact.  Eval for bony vs ligamentous vs meniscal injury.  Obtain xr hip/pelvis, femur, knee, tib/fib; give pain med, likely dc with knee immob device, ortho f/u.

## 2024-06-18 NOTE — ED PROVIDER NOTE - PHYSICAL EXAMINATION
GENERAL: well appearing in no acute distress, non-toxic appearing  HEAD: normocephalic, atraumatic  CARDIAC: regular rate and rhythm, normal S1S2, no appreciable murmurs, 2+ pulses in UE/LE b/l  PULM: normal breath sounds, clear to ascultation bilaterally, no rales, rhonchi, wheezing  GI: abdomen nondistended, soft, nontender, no guarding, rebound tenderness  MSK: no trochanteric pain, able to range hip and knee and ankle passively with no pain, tender to suprapatellar region and tib/fib  SKIN: no overlying skin changes

## 2024-06-18 NOTE — ED PROVIDER NOTE - NSFOLLOWUPINSTRUCTIONS_ED_ALL_ED_FT
No signs of emergency medical condition on today's workup.  Presumptive diagnosis made as strain , but further evaluation may be required by your primary care doctor or specialist for a definitive diagnosis.  Therefore, follow up as directed and if symptoms change/worsen or any emergency conditions, please return to the ER. No signs of emergency medical condition on today's workup.  Presumptive diagnosis made as muscular strain, but further evaluation may be required by your primary care doctor or Orthopedist for a definitive diagnosis.  Therefore, follow up as directed and if symptoms change/worsen or any emergency conditions, please return to the ER. No signs of emergency medical condition on today's workup.  Presumptive diagnosis made as muscular strain, but further evaluation may be required by your primary care doctor or Orthopedist for a definitive diagnosis.  Therefore, follow up as directed and if symptoms change/worsen or any emergency conditions, please return to the ER.    The hospital should call you to help set up an appointment. If you do not hear from someone by tomorrow, please call the number provided to schedule an appointment.

## 2024-06-18 NOTE — ED PROVIDER NOTE - OBJECTIVE STATEMENT
47 yo f hx of DVTs on eliquis, DM here for mechanical fall and R knee pain. patient tripped over dog gate and twisted her left knee, has not been able to bear weight since. denied any head strike or loc. did not take any medication prior to arrival. denies ha, n/v, abdominal pain, other extremity pain.

## 2024-06-18 NOTE — ED ADULT TRIAGE NOTE - CHIEF COMPLAINT QUOTE
Pt c/o right leg pain s/p tripping over gate in house this morning. On Eliquis. Denies LOC or head trauma. Hx: DVT, PE on Eliquis Pt c/o right leg pain from a fall s/p tripping over gate in house this morning. On Eliquis. Denies LOC or head trauma. Hx: DVT, PE on Eliquis

## 2024-06-18 NOTE — ED ADULT NURSE NOTE - CHIEF COMPLAINT QUOTE
Pt c/o right leg pain from a fall s/p tripping over gate in house this morning. On Eliquis. Denies LOC or head trauma. Hx: DVT, PE on Eliquis

## 2024-06-19 ENCOUNTER — APPOINTMENT (OUTPATIENT)
Dept: ORTHOPEDIC SURGERY | Facility: CLINIC | Age: 49
End: 2024-06-19
Payer: COMMERCIAL

## 2024-06-19 VITALS — WEIGHT: 232 LBS | BODY MASS INDEX: 36.41 KG/M2 | HEIGHT: 67 IN

## 2024-06-19 DIAGNOSIS — M23.91 UNSPECIFIED INTERNAL DERANGEMENT OF RIGHT KNEE: ICD-10-CM

## 2024-06-19 DIAGNOSIS — S83.411A SPRAIN OF MEDIAL COLLATERAL LIGAMENT OF RIGHT KNEE, INITIAL ENCOUNTER: ICD-10-CM

## 2024-06-19 PROCEDURE — 99203 OFFICE O/P NEW LOW 30 MIN: CPT

## 2024-06-19 NOTE — HISTORY OF PRESENT ILLNESS
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 The patient's leg slid backward as she attempted to step over a dog gate, resulting in immediate inability to bear weight on the leg. She sought evaluation in the emergency room where x-rays of her knee right hip and femur were negative for fractures. Currently, the patient presents with her knee immobilized and has been non-weightbearing with the assistance of crutches. She reports persistent swelling and notes that Motrin has not alleviated her discomfort. The patient denies any prior history of knee injuries. Medical history notable for recent diagnosis of diabetes.  Medication includes Ozempic.

## 2024-06-19 NOTE — DISCUSSION/SUMMARY
[de-identified] : Discussion had with the patient.  Findings consistent with traumatic right knee injury.  Concern for high-grade tear of the medial collateral ligament and possibly the anterior crucial ligament.  Recommend a hinged neoprene knee brace for comfort and support now on standing and walking.  Weightbearing will be partial to full as tolerated with the knee brace and crutches.  Advised on ice application to the knee and ibuprofen or Tylenol for pain relief.  Advanced imaging with an MRI is indicated to evaluate further.  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  Follow-up after the scans to discuss findings and further treatment recommendations.

## 2024-06-19 NOTE — REASON FOR VISIT
[Initial Visit] : an initial visit for [Knee Injury] : knee injury [FreeTextEntry2] : Right knee pain

## 2024-06-19 NOTE — PHYSICAL EXAM
[DP] : dorsalis pedis 2+ and symmetric bilaterally [de-identified] : Antalgic gait with crutches.  She is not bearing weight on her right lower extremity.  Right knee: Soft tissue swelling/tenderness medially.  Small to moderate effusion.  Able to perform a right lower extremity straight leg raise.  Active right knee flexion 90 degrees and painful.  Pathologic laxity noted with valgus stress in extension and 30 degrees knee flexion.  Guards with Lachman exam.  No calf tenderness.  Sensation intact to light touch right knee and leg. [de-identified] : ACC: [id number] EXAM: XR KNEE 3 VIEWS RT ORDERED BY: LISS SILVA  ACC: [id number] EXAM: XR FEMUR 2 VIEWS RT ORDERED BY: LISS SILVA  ACC: [id number] EXAM: XR TIB FIB AP LAT 2 VIEWS RT ORDERED BY: LISS SILVA  ACC: [id number] EXAM: XR HIP WITH PELV 2-3V RT ORDERED BY: VIRGINIA PROCTOR  PROCEDURE DATE: 06/18/2024    INTERPRETATION: Clinical Indication: Status post fall  Technique: AP view the pelvis and 2 views of the right hip, 2 views of the right femur, 3 views of the right knee and 2 views of the right tibia/fibula.  Comparison: None  Impression: No acute fracture or dislocation. Joint spaces are maintained.  --- End of Report ---      YONI KEATING MD; Attending Radiologist This document has been electronically signed. Jun 18 2024 11:20AM

## 2024-06-21 ENCOUNTER — APPOINTMENT (OUTPATIENT)
Dept: MRI IMAGING | Facility: CLINIC | Age: 49
End: 2024-06-21
Payer: COMMERCIAL

## 2024-06-21 ENCOUNTER — OUTPATIENT (OUTPATIENT)
Dept: OUTPATIENT SERVICES | Facility: HOSPITAL | Age: 49
LOS: 1 days | End: 2024-06-21
Payer: COMMERCIAL

## 2024-06-21 DIAGNOSIS — Z00.00 ENCOUNTER FOR GENERAL ADULT MEDICAL EXAMINATION WITHOUT ABNORMAL FINDINGS: ICD-10-CM

## 2024-06-21 DIAGNOSIS — Z98.890 OTHER SPECIFIED POSTPROCEDURAL STATES: Chronic | ICD-10-CM

## 2024-06-21 PROCEDURE — 73721 MRI JNT OF LWR EXTRE W/O DYE: CPT

## 2024-06-21 PROCEDURE — 73721 MRI JNT OF LWR EXTRE W/O DYE: CPT | Mod: 26,RT

## 2024-06-26 ENCOUNTER — APPOINTMENT (OUTPATIENT)
Dept: ORTHOPEDIC SURGERY | Facility: CLINIC | Age: 49
End: 2024-06-26
Payer: COMMERCIAL

## 2024-06-26 VITALS — HEIGHT: 69 IN | WEIGHT: 234 LBS | BODY MASS INDEX: 34.66 KG/M2

## 2024-06-26 DIAGNOSIS — S83.511D SPRAIN OF ANTERIOR CRUCIATE LIGAMENT OF RIGHT KNEE, SUBSEQUENT ENCOUNTER: ICD-10-CM

## 2024-06-26 DIAGNOSIS — S83.411D SPRAIN OF MEDIAL COLLATERAL LIGAMENT OF RIGHT KNEE, SUBSEQUENT ENCOUNTER: ICD-10-CM

## 2024-06-26 PROCEDURE — 99214 OFFICE O/P EST MOD 30 MIN: CPT

## 2024-06-28 ENCOUNTER — TRANSCRIPTION ENCOUNTER (OUTPATIENT)
Age: 49
End: 2024-06-28

## 2024-07-01 ENCOUNTER — NON-APPOINTMENT (OUTPATIENT)
Age: 49
End: 2024-07-01

## 2024-07-02 ENCOUNTER — TRANSCRIPTION ENCOUNTER (OUTPATIENT)
Age: 49
End: 2024-07-02

## 2024-07-02 ENCOUNTER — OUTPATIENT (OUTPATIENT)
Dept: OUTPATIENT SERVICES | Facility: HOSPITAL | Age: 49
LOS: 1 days | End: 2024-07-02

## 2024-07-02 VITALS
OXYGEN SATURATION: 99 % | WEIGHT: 227.08 LBS | TEMPERATURE: 98 F | SYSTOLIC BLOOD PRESSURE: 120 MMHG | RESPIRATION RATE: 16 BRPM | HEIGHT: 69 IN | HEART RATE: 59 BPM | DIASTOLIC BLOOD PRESSURE: 80 MMHG

## 2024-07-02 DIAGNOSIS — Z90.710 ACQUIRED ABSENCE OF BOTH CERVIX AND UTERUS: Chronic | ICD-10-CM

## 2024-07-02 DIAGNOSIS — F32.89 OTHER SPECIFIED DEPRESSIVE EPISODES: ICD-10-CM

## 2024-07-02 DIAGNOSIS — I26.99 OTHER PULMONARY EMBOLISM WITHOUT ACUTE COR PULMONALE: ICD-10-CM

## 2024-07-02 DIAGNOSIS — F41.9 ANXIETY DISORDER, UNSPECIFIED: ICD-10-CM

## 2024-07-02 DIAGNOSIS — E11.9 TYPE 2 DIABETES MELLITUS WITHOUT COMPLICATIONS: ICD-10-CM

## 2024-07-02 DIAGNOSIS — S83.519A SPRAIN OF ANTERIOR CRUCIATE LIGAMENT OF UNSPECIFIED KNEE, INITIAL ENCOUNTER: ICD-10-CM

## 2024-07-02 DIAGNOSIS — S83.411A SPRAIN OF MEDIAL COLLATERAL LIGAMENT OF RIGHT KNEE, INITIAL ENCOUNTER: ICD-10-CM

## 2024-07-02 DIAGNOSIS — Z98.890 OTHER SPECIFIED POSTPROCEDURAL STATES: Chronic | ICD-10-CM

## 2024-07-02 RX ORDER — ACETAMINOPHEN 500 MG
3 TABLET ORAL
Qty: 0 | Refills: 0 | DISCHARGE

## 2024-07-02 RX ORDER — DUPILUMAB 300 MG/2ML
0 INJECTION, SOLUTION SUBCUTANEOUS
Refills: 0 | DISCHARGE

## 2024-07-02 RX ORDER — DEXTROAMPHETAMINE SACCHARATE, AMPHETAMINE ASPARTATE, DEXTROAMPHETAMINE SULFATE AND AMPHETAMINE SULFATE 1.875; 1.875; 1.875; 1.875 MG/1; MG/1; MG/1; MG/1
1 TABLET ORAL
Refills: 0 | DISCHARGE

## 2024-07-02 RX ORDER — IBUPROFEN 200 MG
3 TABLET ORAL
Qty: 0 | Refills: 0 | DISCHARGE

## 2024-07-09 ENCOUNTER — NON-APPOINTMENT (OUTPATIENT)
Age: 49
End: 2024-07-09

## 2024-07-10 ENCOUNTER — APPOINTMENT (OUTPATIENT)
Dept: INTERNAL MEDICINE | Facility: CLINIC | Age: 49
End: 2024-07-10
Payer: COMMERCIAL

## 2024-07-10 VITALS
WEIGHT: 226 LBS | TEMPERATURE: 97.8 F | DIASTOLIC BLOOD PRESSURE: 82 MMHG | HEART RATE: 82 BPM | BODY MASS INDEX: 33.37 KG/M2 | SYSTOLIC BLOOD PRESSURE: 137 MMHG | OXYGEN SATURATION: 98 %

## 2024-07-10 DIAGNOSIS — I26.99 OTHER PULMONARY EMBOLISM W/OUT ACUTE COR PULMONALE: ICD-10-CM

## 2024-07-10 DIAGNOSIS — I82.409 ACUTE EMBOLISM AND THROMBOSIS OF UNSPECIFIED DEEP VEINS OF UNSPECIFIED LOWER EXTREMITY: ICD-10-CM

## 2024-07-10 DIAGNOSIS — Z01.818 ENCOUNTER FOR OTHER PREPROCEDURAL EXAMINATION: ICD-10-CM

## 2024-07-10 DIAGNOSIS — Z87.898 PERSONAL HISTORY OF OTHER SPECIFIED CONDITIONS: ICD-10-CM

## 2024-07-10 DIAGNOSIS — E11.628 TYPE 2 DIABETES MELLITUS WITH OTHER SKIN COMPLICATIONS: ICD-10-CM

## 2024-07-10 DIAGNOSIS — S83.411A SPRAIN OF MEDIAL COLLATERAL LIGAMENT OF RIGHT KNEE, INITIAL ENCOUNTER: ICD-10-CM

## 2024-07-10 DIAGNOSIS — Z01.411 ENCOUNTER FOR GYNECOLOGICAL EXAMINATION (GENERAL) (ROUTINE) WITH ABNORMAL FINDINGS: ICD-10-CM

## 2024-07-10 PROCEDURE — 99215 OFFICE O/P EST HI 40 MIN: CPT

## 2024-07-15 ENCOUNTER — TRANSCRIPTION ENCOUNTER (OUTPATIENT)
Age: 49
End: 2024-07-15

## 2024-07-16 ENCOUNTER — TRANSCRIPTION ENCOUNTER (OUTPATIENT)
Age: 49
End: 2024-07-16

## 2024-07-16 ENCOUNTER — APPOINTMENT (OUTPATIENT)
Dept: ORTHOPEDIC SURGERY | Facility: AMBULATORY SURGERY CENTER | Age: 49
End: 2024-07-16

## 2024-07-16 ENCOUNTER — OUTPATIENT (OUTPATIENT)
Dept: OUTPATIENT SERVICES | Facility: HOSPITAL | Age: 49
LOS: 1 days | Discharge: HOME | End: 2024-07-16
Payer: COMMERCIAL

## 2024-07-16 VITALS
WEIGHT: 227.08 LBS | RESPIRATION RATE: 16 BRPM | HEIGHT: 69 IN | TEMPERATURE: 98 F | OXYGEN SATURATION: 98 % | HEART RATE: 83 BPM | DIASTOLIC BLOOD PRESSURE: 84 MMHG | SYSTOLIC BLOOD PRESSURE: 117 MMHG

## 2024-07-16 VITALS
HEART RATE: 85 BPM | SYSTOLIC BLOOD PRESSURE: 121 MMHG | RESPIRATION RATE: 16 BRPM | TEMPERATURE: 98 F | DIASTOLIC BLOOD PRESSURE: 91 MMHG | OXYGEN SATURATION: 100 %

## 2024-07-16 DIAGNOSIS — Z90.710 ACQUIRED ABSENCE OF BOTH CERVIX AND UTERUS: Chronic | ICD-10-CM

## 2024-07-16 DIAGNOSIS — S83.411A SPRAIN OF MEDIAL COLLATERAL LIGAMENT OF RIGHT KNEE, INITIAL ENCOUNTER: ICD-10-CM

## 2024-07-16 DIAGNOSIS — Z98.890 OTHER SPECIFIED POSTPROCEDURAL STATES: Chronic | ICD-10-CM

## 2024-07-16 PROCEDURE — 29888 ARTHRS AID ACL RPR/AGMNTJ: CPT | Mod: RT

## 2024-07-16 DEVICE — SCREW BC 7X20MM: Type: IMPLANTABLE DEVICE | Site: RIGHT | Status: FUNCTIONAL

## 2024-07-16 DEVICE — IMPLANTABLE DEVICE: Type: IMPLANTABLE DEVICE | Site: RIGHT | Status: FUNCTIONAL

## 2024-07-16 DEVICE — SCREW BC 9X20MM: Type: IMPLANTABLE DEVICE | Site: RIGHT | Status: FUNCTIONAL

## 2024-07-16 DEVICE — FLEXIBLE PASSING PIN 13.50X2.4MM: Type: IMPLANTABLE DEVICE | Site: RIGHT | Status: FUNCTIONAL

## 2024-07-16 RX ORDER — DUPILUMAB 200 MG/1.14ML
300 INJECTION, SOLUTION SUBCUTANEOUS
Refills: 0 | DISCHARGE

## 2024-07-16 RX ORDER — GABAPENTIN
1 POWDER (GRAM) MISCELLANEOUS
Refills: 0 | DISCHARGE

## 2024-07-16 RX ORDER — SEMAGLUTIDE 1.34 MG/ML
0.5 INJECTION, SOLUTION SUBCUTANEOUS
Refills: 0 | DISCHARGE

## 2024-07-16 RX ORDER — ESCITALOPRAM OXALATE 20 MG/1
1 TABLET, FILM COATED ORAL
Refills: 0 | DISCHARGE

## 2024-07-16 RX ORDER — PROPRANOLOL HCL 80 MG
1 TABLET ORAL
Refills: 0 | DISCHARGE

## 2024-07-16 NOTE — BRIEF OPERATIVE NOTE - NSICDXBRIEFPROCEDURE_GEN_ALL_CORE_FT
PROCEDURES:  Reconstruction of right anterior cruciate ligament 16-Jul-2024 11:54:00  Jagdish Patel

## 2024-07-16 NOTE — BRIEF OPERATIVE NOTE - NSICDXBRIEFPOSTOP_GEN_ALL_CORE_FT
POST-OP DIAGNOSIS:  Rupture of anterior cruciate ligament of right knee 16-Jul-2024 11:54:17  Jagdish Patel

## 2024-07-16 NOTE — BRIEF OPERATIVE NOTE - OPERATION/FINDINGS
Right ACL tear, MCL stable to exam under anesthesia with firm endpoint now s/p arthroscopic assisted ACL reconstruction with BTB autograft

## 2024-07-16 NOTE — ASU DISCHARGE PLAN (ADULT/PEDIATRIC) - NS MD DC FALL RISK RISK
For information on Fall & Injury Prevention, visit: [email or web address] OR  [email or web address] OR  https://www.cdc.gov/steadi/patient.html

## 2024-07-16 NOTE — BRIEF OPERATIVE NOTE - NSICDXBRIEFPREOP_GEN_ALL_CORE_FT
PRE-OP DIAGNOSIS:  Rupture of anterior cruciate ligament of right knee 16-Jul-2024 11:54:11  Jagdish Patel

## 2024-07-16 NOTE — BRIEF OPERATIVE NOTE - ESTIMATED BLOOD LOSS
Physician Progress Note      PATIENT:               Kamala Mckenzie  CSN #:                  [id number]  :                       1942  ADMIT DATE:       2021 5:28 PM  [address] [place] DATE:        2021 8:45 PM  RESPONDING  PROVIDER #:        Varun Garza MD          QUERY TEXT:    Dear Dr. Tessa Bush,  Patient admitted with chronic non healing diabetic wound of the R foot. Noted documentation of sepsis in admission H&P. In order to support the diagnosis of sepsis, please include additional clinical indicators in your documentation. Or please document if the diagnosis of sepsis has been ruled out after further study. The medical record reflects the following:  Risk Factors: 70 yr. old male with a history of diabetes was admitted with osteomyelitis and diagnosed with sepsis. Clinical Indicators: ED documents, \"The patient does meet SIRS criteria to suspect possible sepsis. SIRS criteria met in the emergency department includes: Temp (degrees) >100.9 F (38.9 C) or <90.8 F (36 C), HR >90 bpm\". HR was 102, 97, 95, 87 on admission and 100, 98 at discharge. Temp was 100.6 oral on admission. HR was still elevated at discharge and may be challenged as a sepsis indicator. Treatment: IV fluids and IV antibiotics, lab work, monitoring and evaluation  Options provided:  -- Sepsis present as evidenced by, Please document evidence.   -- Sepsis was ruled out after study  -- Other - I will add my own diagnosis  -- Disagree - Not applicable / Not valid  -- Disagree - Clinically unable to determine / Unknown  -- Refer to Clinical Documentation Reviewer    PROVIDER RESPONSE TEXT:    Sepsis is present as evidenced by temp 100.6 HR 102source :osteomyelitis    Query created by: Rolly Abrams on 2021 4:24 PM      Electronically signed by:  Varun Garza MD 8/3/2021 7:58 AM 0

## 2024-07-16 NOTE — ASU DISCHARGE PLAN (ADULT/PEDIATRIC) - CARE PROVIDER_API CALL
Wali Del Castillo  Orthopaedic Surgery  [address], [address]  [place] [address]  Phone: [phone]  Fax: [phone]  Follow Up Time: 2 weeks

## 2024-07-16 NOTE — ASU DISCHARGE PLAN (ADULT/PEDIATRIC) - FOLLOW UP APPOINTMENTS
[facility], [facility] ([facility]) [phone]

## 2024-07-22 ENCOUNTER — NON-APPOINTMENT (OUTPATIENT)
Age: 49
End: 2024-07-22

## 2024-07-22 RX ORDER — OXYCODONE 5 MG/1
5 TABLET ORAL
Qty: 20 | Refills: 0 | Status: ACTIVE | COMMUNITY
Start: 2024-07-15 | End: 1900-01-01

## 2024-07-23 ENCOUNTER — NON-APPOINTMENT (OUTPATIENT)
Age: 49
End: 2024-07-23

## 2024-07-25 ENCOUNTER — APPOINTMENT (OUTPATIENT)
Dept: INTERNAL MEDICINE | Facility: CLINIC | Age: 49
End: 2024-07-25
Payer: COMMERCIAL

## 2024-07-25 ENCOUNTER — APPOINTMENT (OUTPATIENT)
Dept: ORTHOPEDIC SURGERY | Facility: CLINIC | Age: 49
End: 2024-07-25
Payer: COMMERCIAL

## 2024-07-25 VITALS
TEMPERATURE: 98 F | BODY MASS INDEX: 33.18 KG/M2 | SYSTOLIC BLOOD PRESSURE: 133 MMHG | HEIGHT: 69 IN | HEART RATE: 116 BPM | WEIGHT: 224 LBS | OXYGEN SATURATION: 98 % | DIASTOLIC BLOOD PRESSURE: 84 MMHG

## 2024-07-25 VITALS — HEIGHT: 69 IN | BODY MASS INDEX: 33.47 KG/M2 | WEIGHT: 226 LBS

## 2024-07-25 DIAGNOSIS — I26.99 OTHER PULMONARY EMBOLISM W/OUT ACUTE COR PULMONALE: ICD-10-CM

## 2024-07-25 DIAGNOSIS — E11.628 TYPE 2 DIABETES MELLITUS WITH OTHER SKIN COMPLICATIONS: ICD-10-CM

## 2024-07-25 DIAGNOSIS — Z98.890 OTHER SPECIFIED POSTPROCEDURAL STATES: ICD-10-CM

## 2024-07-25 DIAGNOSIS — S83.511D SPRAIN OF ANTERIOR CRUCIATE LIGAMENT OF RIGHT KNEE, SUBSEQUENT ENCOUNTER: ICD-10-CM

## 2024-07-25 DIAGNOSIS — I82.409 ACUTE EMBOLISM AND THROMBOSIS OF UNSPECIFIED DEEP VEINS OF UNSPECIFIED LOWER EXTREMITY: ICD-10-CM

## 2024-07-25 PROBLEM — R23.2 FLUSHING: Chronic | Status: ACTIVE | Noted: 2024-07-02

## 2024-07-25 PROBLEM — D21.9 BENIGN NEOPLASM OF CONNECTIVE AND OTHER SOFT TISSUE, UNSPECIFIED: Chronic | Status: ACTIVE | Noted: 2024-07-02

## 2024-07-25 PROCEDURE — 99213 OFFICE O/P EST LOW 20 MIN: CPT

## 2024-07-25 PROCEDURE — 99024 POSTOP FOLLOW-UP VISIT: CPT

## 2024-07-25 RX ORDER — TRAMADOL HYDROCHLORIDE 50 MG/1
50 TABLET, COATED ORAL EVERY 8 HOURS
Qty: 10 | Refills: 0 | Status: ACTIVE | COMMUNITY
Start: 2024-07-15 | End: 1900-01-01

## 2024-07-25 NOTE — HISTORY OF PRESENT ILLNESS
[Post-hospitalization from ___ Hospital] : Post-hospitalization from [unfilled] Hospital [Admitted on: ___] : The patient was admitted on [unfilled] [Discharge Summary] : discharge summary [Discharge Med List] : discharge medication list [Patient Contacted By: ____] : and contacted by [unfilled]

## 2024-07-26 LAB
CHOLEST SERPL-MCNC: 187 MG/DL
ESTIMATED AVERAGE GLUCOSE: 131 MG/DL
HBA1C MFR BLD HPLC: 6.2 %
HDLC SERPL-MCNC: 49 MG/DL
LDLC SERPL CALC-MCNC: 115 MG/DL
NONHDLC SERPL-MCNC: 138 MG/DL
TRIGL SERPL-MCNC: 128 MG/DL

## 2024-07-30 ENCOUNTER — APPOINTMENT (OUTPATIENT)
Dept: INTERNAL MEDICINE | Facility: CLINIC | Age: 49
End: 2024-07-30

## 2024-08-16 NOTE — ED PROVIDER NOTE - NSICDXPASTSURGICALHX_GEN_ALL_CORE_FT
Repeat Non-Stress Testing:    Patient verbalizes +FM. Pt denies ALL:               Leaking of fluid   Contractions   Vaginal bleeding   Decreased fetal movement    Patient is performing daily kick counts. Patient has no questions or concerns.   NST strip reviewed by Dr. Worrell.      
The patient was seen today for an ultrasound and NST.  Please see ultrasound report (located under OB Procedures tab) for additional details.  
PAST SURGICAL HISTORY:  No significant past surgical history

## 2024-08-20 ENCOUNTER — NON-APPOINTMENT (OUTPATIENT)
Age: 49
End: 2024-08-20

## 2024-08-22 ENCOUNTER — APPOINTMENT (OUTPATIENT)
Dept: ORTHOPEDIC SURGERY | Facility: CLINIC | Age: 49
End: 2024-08-22
Payer: COMMERCIAL

## 2024-08-22 VITALS
HEART RATE: 108 BPM | WEIGHT: 220 LBS | SYSTOLIC BLOOD PRESSURE: 120 MMHG | BODY MASS INDEX: 32.58 KG/M2 | DIASTOLIC BLOOD PRESSURE: 84 MMHG | HEIGHT: 69 IN

## 2024-08-22 DIAGNOSIS — Z98.890 OTHER SPECIFIED POSTPROCEDURAL STATES: ICD-10-CM

## 2024-08-22 PROBLEM — F41.9 ANXIETY DISORDER, UNSPECIFIED: Chronic | Status: ACTIVE | Noted: 2024-07-02

## 2024-08-22 PROCEDURE — 99024 POSTOP FOLLOW-UP VISIT: CPT

## 2024-09-26 ENCOUNTER — APPOINTMENT (OUTPATIENT)
Dept: ORTHOPEDIC SURGERY | Facility: CLINIC | Age: 49
End: 2024-09-26
Payer: COMMERCIAL

## 2024-09-26 VITALS — BODY MASS INDEX: 32.58 KG/M2 | HEIGHT: 69 IN | WEIGHT: 220 LBS

## 2024-09-26 DIAGNOSIS — Z98.890 OTHER SPECIFIED POSTPROCEDURAL STATES: ICD-10-CM

## 2024-09-26 PROCEDURE — 99024 POSTOP FOLLOW-UP VISIT: CPT

## 2024-09-26 NOTE — HISTORY OF PRESENT ILLNESS
[Healed] : healed [Neuro Intact] : an unremarkable neurological exam [Vascular Intact] : ~T peripheral vascular exam normal [Slow Progress] : is progressing slowly [No Sign of Infection] : is showing no signs of infection [Adequate Pain Control] : has adequate pain control [Chills] : no chills [Fever] : no fever [Erythema] : not erythematous [Discharge] : absent of discharge [Dehiscence] : not dehisced [de-identified] : Post Right knee arthroscopic assisted anterior cruciate ligament reconstruction using central third bone-patellar tendon-bone autograft on 7/16/24. [de-identified] : Patient reports improving pain. She reports compliance with knee brace using 1 crutch. Denies adverse post op events. Attending PT 3 x week. Takes Eliquis for h/o DVT. [de-identified] : Continue outpatient supervised physical therapy.  Focus on improving range of motion, strength and gait.  She will wean from the brace and crutch based on her comfort level. [de-identified] : Healed surgical incisions right knee.  Trace effusion right knee joint.  Disuse atrophy right thigh musculature.  Able to perform a right lower extremity straight leg raise.  Active right knee flexion 110 degrees with a heel slide.  Stable with anterior draw.  Still walking with a somewhat stifflegged gait with her knee brace and single crutch.

## 2024-09-30 NOTE — ED ADULT NURSE NOTE - OBJECTIVE STATEMENT
normal...
pt received in room 7, a&ox4 and ambulatory at baseline, presents s/p trip and fall over dog gate, c/o R ankle/foot pain and R sided hip pain. pt reports taking eliquis daily, denies any head trauma or LOC, chest pain, sob. pt denies dizziness/lightheadedness prior to fall and currently denies. neuro/sensory intact. pt in no acute distress at this time. respirations even and nonlabored on room air. medicated as ordered per EMR. pending XR.   Alexis CGM noted to LUE, no signs of infections, area clean and dry. pt denies insulin pump.
severe

## 2024-10-03 NOTE — H&P PST ADULT - BMI (KG/M2)
Detail Level: Detailed
Quality 431: Preventive Care And Screening: Unhealthy Alcohol Use - Screening: Patient not identified as an unhealthy alcohol user when screened for unhealthy alcohol use using a systematic screening method
Quality 226: Preventive Care And Screening: Tobacco Use: Screening And Cessation Intervention: Patient screened for tobacco use and is an ex/non-smoker
35.6

## 2024-10-10 ENCOUNTER — APPOINTMENT (OUTPATIENT)
Dept: OBGYN | Facility: CLINIC | Age: 49
End: 2024-10-10
Payer: COMMERCIAL

## 2024-10-10 ENCOUNTER — ASOB RESULT (OUTPATIENT)
Age: 49
End: 2024-10-10

## 2024-10-10 VITALS
WEIGHT: 218 LBS | BODY MASS INDEX: 32.29 KG/M2 | HEIGHT: 69 IN | DIASTOLIC BLOOD PRESSURE: 77 MMHG | HEART RATE: 82 BPM | SYSTOLIC BLOOD PRESSURE: 118 MMHG

## 2024-10-10 DIAGNOSIS — R92.30 DENSE BREASTS, UNSPECIFIED: ICD-10-CM

## 2024-10-10 DIAGNOSIS — R39.9 UNSPECIFIED SYMPTOMS AND SIGNS INVOLVING THE GENITOURINARY SYSTEM: ICD-10-CM

## 2024-10-10 PROCEDURE — 99459 PELVIC EXAMINATION: CPT

## 2024-10-10 PROCEDURE — 76830 TRANSVAGINAL US NON-OB: CPT

## 2024-10-10 PROCEDURE — 99214 OFFICE O/P EST MOD 30 MIN: CPT

## 2024-10-13 PROBLEM — N76.0 BACTERIAL VAGINOSIS: Status: ACTIVE | Noted: 2024-10-13 | Resolved: 2024-11-12

## 2024-10-13 LAB
BACTERIA UR CULT: NORMAL
BV BACTERIA RRNA VAG QL NAA+PROBE: DETECTED
C GLABRATA RNA VAG QL NAA+PROBE: NOT DETECTED
CANDIDA RRNA VAG QL PROBE: NOT DETECTED
T VAGINALIS RRNA SPEC QL NAA+PROBE: NOT DETECTED

## 2024-10-14 ENCOUNTER — NON-APPOINTMENT (OUTPATIENT)
Age: 49
End: 2024-10-14

## 2024-11-08 DIAGNOSIS — I82.409 ACUTE EMBOLISM AND THROMBOSIS OF UNSPECIFIED DEEP VEINS OF UNSPECIFIED LOWER EXTREMITY: ICD-10-CM

## 2024-11-18 ENCOUNTER — NON-APPOINTMENT (OUTPATIENT)
Age: 49
End: 2024-11-18

## 2024-11-18 ENCOUNTER — OUTPATIENT (OUTPATIENT)
Dept: OUTPATIENT SERVICES | Facility: HOSPITAL | Age: 49
LOS: 1 days | Discharge: HOME | End: 2024-11-18

## 2024-11-18 DIAGNOSIS — I26.99 OTHER PULMONARY EMBOLISM WITHOUT ACUTE COR PULMONALE: ICD-10-CM

## 2024-11-18 DIAGNOSIS — Z90.710 ACQUIRED ABSENCE OF BOTH CERVIX AND UTERUS: Chronic | ICD-10-CM

## 2024-11-18 DIAGNOSIS — Z98.890 OTHER SPECIFIED POSTPROCEDURAL STATES: Chronic | ICD-10-CM

## 2024-11-22 ENCOUNTER — APPOINTMENT (OUTPATIENT)
Dept: HEMATOLOGY ONCOLOGY | Facility: CLINIC | Age: 49
End: 2024-11-22
Payer: COMMERCIAL

## 2024-11-22 ENCOUNTER — RESULT REVIEW (OUTPATIENT)
Age: 49
End: 2024-11-22

## 2024-11-22 VITALS
RESPIRATION RATE: 17 BRPM | HEART RATE: 84 BPM | SYSTOLIC BLOOD PRESSURE: 112 MMHG | BODY MASS INDEX: 31.32 KG/M2 | TEMPERATURE: 97.8 F | WEIGHT: 212.07 LBS | OXYGEN SATURATION: 100 % | DIASTOLIC BLOOD PRESSURE: 76 MMHG

## 2024-11-22 DIAGNOSIS — Z79.01 ENCOUNTER FOR THERAPEUTIC DRUG LVL MONITORING: ICD-10-CM

## 2024-11-22 DIAGNOSIS — Z51.81 ENCOUNTER FOR THERAPEUTIC DRUG LVL MONITORING: ICD-10-CM

## 2024-11-22 LAB
BASOPHILS # BLD AUTO: 0.03 K/UL — SIGNIFICANT CHANGE UP (ref 0–0.2)
BASOPHILS NFR BLD AUTO: 0.5 % — SIGNIFICANT CHANGE UP (ref 0–2)
EOSINOPHIL # BLD AUTO: 0.13 K/UL — SIGNIFICANT CHANGE UP (ref 0–0.5)
EOSINOPHIL NFR BLD AUTO: 2.1 % — SIGNIFICANT CHANGE UP (ref 0–6)
HCT VFR BLD CALC: 37.4 % — SIGNIFICANT CHANGE UP (ref 34.5–45)
HGB BLD-MCNC: 12.2 G/DL — SIGNIFICANT CHANGE UP (ref 11.5–15.5)
IMM GRANULOCYTES NFR BLD AUTO: 0.2 % — SIGNIFICANT CHANGE UP (ref 0–0.9)
LYMPHOCYTES # BLD AUTO: 1.59 K/UL — SIGNIFICANT CHANGE UP (ref 1–3.3)
LYMPHOCYTES # BLD AUTO: 25.6 % — SIGNIFICANT CHANGE UP (ref 13–44)
MCHC RBC-ENTMCNC: 28.4 PG — SIGNIFICANT CHANGE UP (ref 27–34)
MCHC RBC-ENTMCNC: 32.6 G/DL — SIGNIFICANT CHANGE UP (ref 32–36)
MCV RBC AUTO: 87 FL — SIGNIFICANT CHANGE UP (ref 80–100)
MONOCYTES # BLD AUTO: 0.26 K/UL — SIGNIFICANT CHANGE UP (ref 0–0.9)
MONOCYTES NFR BLD AUTO: 4.2 % — SIGNIFICANT CHANGE UP (ref 2–14)
NEUTROPHILS # BLD AUTO: 4.18 K/UL — SIGNIFICANT CHANGE UP (ref 1.8–7.4)
NEUTROPHILS NFR BLD AUTO: 67.4 % — SIGNIFICANT CHANGE UP (ref 43–77)
NRBC # BLD: 0 /100 WBCS — SIGNIFICANT CHANGE UP (ref 0–0)
NRBC BLD-RTO: 0 /100 WBCS — SIGNIFICANT CHANGE UP (ref 0–0)
PLATELET # BLD AUTO: 403 K/UL — HIGH (ref 150–400)
RBC # BLD: 4.3 M/UL — SIGNIFICANT CHANGE UP (ref 3.8–5.2)
RBC # FLD: 13.7 % — SIGNIFICANT CHANGE UP (ref 10.3–14.5)
WBC # BLD: 6.2 K/UL — SIGNIFICANT CHANGE UP (ref 3.8–10.5)
WBC # FLD AUTO: 6.2 K/UL — SIGNIFICANT CHANGE UP (ref 3.8–10.5)

## 2024-11-22 PROCEDURE — 99213 OFFICE O/P EST LOW 20 MIN: CPT

## 2024-12-02 ENCOUNTER — OUTPATIENT (OUTPATIENT)
Dept: OUTPATIENT SERVICES | Facility: HOSPITAL | Age: 49
LOS: 1 days | End: 2024-12-02
Payer: COMMERCIAL

## 2024-12-02 ENCOUNTER — APPOINTMENT (OUTPATIENT)
Dept: MAMMOGRAPHY | Facility: CLINIC | Age: 49
End: 2024-12-02
Payer: COMMERCIAL

## 2024-12-02 ENCOUNTER — RESULT REVIEW (OUTPATIENT)
Age: 49
End: 2024-12-02

## 2024-12-02 ENCOUNTER — APPOINTMENT (OUTPATIENT)
Dept: ULTRASOUND IMAGING | Facility: CLINIC | Age: 49
End: 2024-12-02
Payer: COMMERCIAL

## 2024-12-02 DIAGNOSIS — Z98.890 OTHER SPECIFIED POSTPROCEDURAL STATES: Chronic | ICD-10-CM

## 2024-12-02 DIAGNOSIS — Z00.8 ENCOUNTER FOR OTHER GENERAL EXAMINATION: ICD-10-CM

## 2024-12-02 DIAGNOSIS — Z90.710 ACQUIRED ABSENCE OF BOTH CERVIX AND UTERUS: Chronic | ICD-10-CM

## 2024-12-02 PROCEDURE — 77067 SCR MAMMO BI INCL CAD: CPT

## 2024-12-02 PROCEDURE — 76641 ULTRASOUND BREAST COMPLETE: CPT | Mod: 26,50

## 2024-12-02 PROCEDURE — 77067 SCR MAMMO BI INCL CAD: CPT | Mod: 26

## 2024-12-02 PROCEDURE — 77063 BREAST TOMOSYNTHESIS BI: CPT | Mod: 26

## 2024-12-02 PROCEDURE — 77063 BREAST TOMOSYNTHESIS BI: CPT

## 2024-12-02 PROCEDURE — 76641 ULTRASOUND BREAST COMPLETE: CPT

## 2024-12-04 ENCOUNTER — APPOINTMENT (OUTPATIENT)
Dept: PEDIATRIC ALLERGY IMMUNOLOGY | Facility: CLINIC | Age: 49
End: 2024-12-04

## 2024-12-04 ENCOUNTER — APPOINTMENT (OUTPATIENT)
Dept: ORTHOPEDIC SURGERY | Facility: CLINIC | Age: 49
End: 2024-12-04
Payer: COMMERCIAL

## 2024-12-04 ENCOUNTER — NON-APPOINTMENT (OUTPATIENT)
Age: 49
End: 2024-12-04

## 2024-12-04 VITALS
DIASTOLIC BLOOD PRESSURE: 82 MMHG | SYSTOLIC BLOOD PRESSURE: 119 MMHG | WEIGHT: 210 LBS | BODY MASS INDEX: 31.1 KG/M2 | OXYGEN SATURATION: 98 % | HEIGHT: 69 IN | HEART RATE: 82 BPM

## 2024-12-04 VITALS — HEIGHT: 69 IN | WEIGHT: 210 LBS | BODY MASS INDEX: 31.1 KG/M2

## 2024-12-04 DIAGNOSIS — R29.898 OTHER SYMPTOMS AND SIGNS INVOLVING THE MUSCULOSKELETAL SYSTEM: ICD-10-CM

## 2024-12-04 DIAGNOSIS — Z88.1 ALLERGY STATUS TO OTHER ANTIBIOTIC AGENTS: ICD-10-CM

## 2024-12-04 DIAGNOSIS — L20.9 ATOPIC DERMATITIS, UNSPECIFIED: ICD-10-CM

## 2024-12-04 DIAGNOSIS — J31.0 CHRONIC RHINITIS: ICD-10-CM

## 2024-12-04 DIAGNOSIS — Z98.890 OTHER SPECIFIED POSTPROCEDURAL STATES: ICD-10-CM

## 2024-12-04 DIAGNOSIS — Z87.898 PERSONAL HISTORY OF OTHER SPECIFIED CONDITIONS: ICD-10-CM

## 2024-12-04 PROCEDURE — 94010 BREATHING CAPACITY TEST: CPT

## 2024-12-04 PROCEDURE — 95004 PERQ TESTS W/ALRGNC XTRCS: CPT

## 2024-12-04 PROCEDURE — 99205 OFFICE O/P NEW HI 60 MIN: CPT | Mod: 25

## 2024-12-04 PROCEDURE — 99213 OFFICE O/P EST LOW 20 MIN: CPT

## 2024-12-06 PROBLEM — Z88.1 DRUG ALLERGY, ANTIBIOTIC: Status: ACTIVE | Noted: 2024-12-06

## 2024-12-06 PROBLEM — L20.9 ATOPIC DERMATITIS: Status: ACTIVE | Noted: 2024-12-06

## 2024-12-17 ENCOUNTER — NON-APPOINTMENT (OUTPATIENT)
Age: 49
End: 2024-12-17

## 2024-12-19 ENCOUNTER — APPOINTMENT (OUTPATIENT)
Dept: ORTHOPEDIC SURGERY | Facility: CLINIC | Age: 49
End: 2024-12-19
Payer: COMMERCIAL

## 2024-12-19 VITALS — HEIGHT: 69 IN | BODY MASS INDEX: 31.1 KG/M2 | WEIGHT: 210 LBS

## 2024-12-19 DIAGNOSIS — R29.898 OTHER SYMPTOMS AND SIGNS INVOLVING THE MUSCULOSKELETAL SYSTEM: ICD-10-CM

## 2024-12-19 DIAGNOSIS — Z98.890 OTHER SPECIFIED POSTPROCEDURAL STATES: ICD-10-CM

## 2024-12-19 DIAGNOSIS — S86.911A STRAIN OF UNSPECIFIED MUSCLE(S) AND TENDON(S) AT LOWER LEG LEVEL, RIGHT LEG, INITIAL ENCOUNTER: ICD-10-CM

## 2024-12-19 PROCEDURE — 99213 OFFICE O/P EST LOW 20 MIN: CPT

## 2024-12-20 ENCOUNTER — APPOINTMENT (OUTPATIENT)
Dept: MAMMOGRAPHY | Facility: IMAGING CENTER | Age: 49
End: 2024-12-20

## 2024-12-20 ENCOUNTER — APPOINTMENT (OUTPATIENT)
Dept: ULTRASOUND IMAGING | Facility: IMAGING CENTER | Age: 49
End: 2024-12-20

## 2024-12-24 ENCOUNTER — APPOINTMENT (OUTPATIENT)
Dept: INTERNAL MEDICINE | Facility: CLINIC | Age: 49
End: 2024-12-24

## 2025-01-03 ENCOUNTER — NON-APPOINTMENT (OUTPATIENT)
Age: 50
End: 2025-01-03

## 2025-01-06 ENCOUNTER — NON-APPOINTMENT (OUTPATIENT)
Age: 50
End: 2025-01-06

## 2025-01-14 ENCOUNTER — APPOINTMENT (OUTPATIENT)
Dept: INTERNAL MEDICINE | Facility: CLINIC | Age: 50
End: 2025-01-14

## 2025-01-14 VITALS
DIASTOLIC BLOOD PRESSURE: 85 MMHG | TEMPERATURE: 97.4 F | WEIGHT: 207 LBS | HEART RATE: 98 BPM | SYSTOLIC BLOOD PRESSURE: 124 MMHG | BODY MASS INDEX: 30.66 KG/M2 | HEIGHT: 69 IN | OXYGEN SATURATION: 98 %

## 2025-01-14 DIAGNOSIS — I82.409 ACUTE EMBOLISM AND THROMBOSIS OF UNSPECIFIED DEEP VEINS OF UNSPECIFIED LOWER EXTREMITY: ICD-10-CM

## 2025-01-14 DIAGNOSIS — E11.628 TYPE 2 DIABETES MELLITUS WITH OTHER SKIN COMPLICATIONS: ICD-10-CM

## 2025-01-14 DIAGNOSIS — Z00.00 ENCOUNTER FOR GENERAL ADULT MEDICAL EXAMINATION W/OUT ABNORMAL FINDINGS: ICD-10-CM

## 2025-01-14 DIAGNOSIS — Z51.81 ENCOUNTER FOR THERAPEUTIC DRUG LVL MONITORING: ICD-10-CM

## 2025-01-14 DIAGNOSIS — Z23 ENCOUNTER FOR IMMUNIZATION: ICD-10-CM

## 2025-01-14 DIAGNOSIS — Z79.01 ENCOUNTER FOR THERAPEUTIC DRUG LVL MONITORING: ICD-10-CM

## 2025-01-14 PROCEDURE — 90656 IIV3 VACC NO PRSV 0.5 ML IM: CPT

## 2025-01-14 PROCEDURE — 90715 TDAP VACCINE 7 YRS/> IM: CPT

## 2025-01-14 PROCEDURE — 99214 OFFICE O/P EST MOD 30 MIN: CPT | Mod: 25

## 2025-01-14 PROCEDURE — G0008: CPT

## 2025-01-14 PROCEDURE — 90472 IMMUNIZATION ADMIN EACH ADD: CPT

## 2025-01-14 PROCEDURE — 36415 COLL VENOUS BLD VENIPUNCTURE: CPT

## 2025-01-15 LAB
CARDIOLIPIN IGM SER-MCNC: 6 MPL U/ML
CHOLEST SERPL-MCNC: 202 MG/DL
ESTIMATED AVERAGE GLUCOSE: 105 MG/DL
HBA1C MFR BLD HPLC: 5.3 %
HDLC SERPL-MCNC: 51 MG/DL
LDLC SERPL CALC-MCNC: 132 MG/DL
NONHDLC SERPL-MCNC: 150 MG/DL
TRIGL SERPL-MCNC: 102 MG/DL
TSH SERPL-ACNC: 1.64 UIU/ML

## 2025-02-05 ENCOUNTER — APPOINTMENT (OUTPATIENT)
Dept: ORTHOPEDIC SURGERY | Facility: CLINIC | Age: 50
End: 2025-02-05
Payer: COMMERCIAL

## 2025-02-05 VITALS — WEIGHT: 210 LBS | BODY MASS INDEX: 31.1 KG/M2 | HEIGHT: 69 IN

## 2025-02-05 DIAGNOSIS — R29.898 OTHER SYMPTOMS AND SIGNS INVOLVING THE MUSCULOSKELETAL SYSTEM: ICD-10-CM

## 2025-02-05 DIAGNOSIS — Z98.890 OTHER SPECIFIED POSTPROCEDURAL STATES: ICD-10-CM

## 2025-02-05 PROCEDURE — 99213 OFFICE O/P EST LOW 20 MIN: CPT

## 2025-05-23 ENCOUNTER — NON-APPOINTMENT (OUTPATIENT)
Age: 50
End: 2025-05-23

## 2025-06-10 NOTE — DISCHARGE NOTE PROVIDER - NSDCQMPCI_CARD_ALL_CORE
As we discussed your liver enzymes were elevated, cutting back on drinking alcohol will help with this.  Please follow-up with your primary care doctor for recheck of your liver enzymes  
No

## 2025-06-12 ENCOUNTER — APPOINTMENT (OUTPATIENT)
Dept: ORTHOPEDIC SURGERY | Facility: CLINIC | Age: 50
End: 2025-06-12
Payer: COMMERCIAL

## 2025-06-12 VITALS — HEIGHT: 69 IN | BODY MASS INDEX: 31.1 KG/M2 | WEIGHT: 210 LBS

## 2025-06-12 PROBLEM — M22.41 CHONDROMALACIA PATELLAE OF RIGHT KNEE: Status: ACTIVE | Noted: 2025-06-12

## 2025-06-12 PROBLEM — M25.561 PAIN OF PATELLOFEMORAL JOINT, RIGHT: Status: ACTIVE | Noted: 2025-06-12

## 2025-06-12 PROBLEM — M17.11 PATELLOFEMORAL ARTHRITIS OF RIGHT KNEE: Status: ACTIVE | Noted: 2025-06-12

## 2025-06-12 PROCEDURE — 99213 OFFICE O/P EST LOW 20 MIN: CPT

## 2025-06-12 PROCEDURE — 73562 X-RAY EXAM OF KNEE 3: CPT | Mod: RT

## 2025-06-12 RX ORDER — HYALURONATE SODIUM, STABILIZED 60 MG/3 ML
60 SYRINGE (ML) INTRAARTICULAR
Qty: 1 | Refills: 0 | Status: ACTIVE | COMMUNITY
Start: 2025-06-12

## 2025-06-19 ENCOUNTER — APPOINTMENT (OUTPATIENT)
Dept: INTERNAL MEDICINE | Facility: CLINIC | Age: 50
End: 2025-06-19

## 2025-06-19 ENCOUNTER — LABORATORY RESULT (OUTPATIENT)
Age: 50
End: 2025-06-19

## 2025-06-19 VITALS
HEART RATE: 92 BPM | WEIGHT: 185 LBS | HEIGHT: 69 IN | TEMPERATURE: 98 F | SYSTOLIC BLOOD PRESSURE: 107 MMHG | BODY MASS INDEX: 27.4 KG/M2 | DIASTOLIC BLOOD PRESSURE: 72 MMHG | OXYGEN SATURATION: 98 %

## 2025-06-19 PROBLEM — Z02.0 ENCOUNTER FOR SCHOOL HEALTH EXAMINATION: Status: ACTIVE | Noted: 2025-06-19

## 2025-06-19 PROBLEM — Z11.3 SCREEN FOR STD (SEXUALLY TRANSMITTED DISEASE): Status: ACTIVE | Noted: 2025-06-19

## 2025-06-19 PROCEDURE — 99396 PREV VISIT EST AGE 40-64: CPT

## 2025-06-19 RX ORDER — SEMAGLUTIDE 1.34 MG/ML
4 INJECTION, SOLUTION SUBCUTANEOUS WEEKLY
Qty: 3 | Refills: 2 | Status: ACTIVE | COMMUNITY
Start: 2025-06-19 | End: 1900-01-01

## 2025-06-20 ENCOUNTER — NON-APPOINTMENT (OUTPATIENT)
Age: 50
End: 2025-06-20

## 2025-06-20 LAB
25(OH)D3 SERPL-MCNC: 24.9 NG/ML
ALBUMIN SERPL ELPH-MCNC: 4 G/DL
ALP BLD-CCNC: 89 U/L
ALT SERPL-CCNC: 15 U/L
ANION GAP SERPL CALC-SCNC: 13 MMOL/L
AST SERPL-CCNC: 17 U/L
BASOPHILS # BLD AUTO: 0.05 K/UL
BASOPHILS NFR BLD AUTO: 0.8 %
BILIRUB SERPL-MCNC: 0.3 MG/DL
BUN SERPL-MCNC: 6 MG/DL
CALCIUM SERPL-MCNC: 9.6 MG/DL
CHLORIDE SERPL-SCNC: 103 MMOL/L
CHOLEST SERPL-MCNC: 191 MG/DL
CO2 SERPL-SCNC: 22 MMOL/L
CREAT SERPL-MCNC: 0.74 MG/DL
EGFRCR SERPLBLD CKD-EPI 2021: 100 ML/MIN/1.73M2
EOSINOPHIL # BLD AUTO: 0.07 K/UL
EOSINOPHIL NFR BLD AUTO: 1.2 %
ESTIMATED AVERAGE GLUCOSE: 103 MG/DL
GLUCOSE SERPL-MCNC: 71 MG/DL
HBA1C MFR BLD HPLC: 5.2 %
HBV SURFACE AB SER QL: ABNORMAL
HBV SURFACE AG SER QL: NONREACTIVE
HCT VFR BLD CALC: 39.9 %
HDLC SERPL-MCNC: 57 MG/DL
HGB BLD-MCNC: 13 G/DL
HIV1+2 AB SPEC QL IA.RAPID: NONREACTIVE
IMM GRANULOCYTES NFR BLD AUTO: 0.2 %
LDLC SERPL-MCNC: 120 MG/DL
LYMPHOCYTES # BLD AUTO: 2.12 K/UL
LYMPHOCYTES NFR BLD AUTO: 35.6 %
MAN DIFF?: NORMAL
MCHC RBC-ENTMCNC: 28.1 PG
MCHC RBC-ENTMCNC: 32.6 G/DL
MCV RBC AUTO: 86.4 FL
MEV IGG FLD QL IA: >300 AU/ML
MEV IGG+IGM SER-IMP: POSITIVE
MONOCYTES # BLD AUTO: 0.39 K/UL
MONOCYTES NFR BLD AUTO: 6.6 %
MUV AB SER-ACNC: POSITIVE
MUV IGG SER QL IA: 64.2 AU/ML
N GONORRHOEA RRNA SPEC QL NAA+PROBE: NOT DETECTED
NEUTROPHILS # BLD AUTO: 3.31 K/UL
NEUTROPHILS NFR BLD AUTO: 55.6 %
NONHDLC SERPL-MCNC: 134 MG/DL
PLATELET # BLD AUTO: 402 K/UL
POTASSIUM SERPL-SCNC: 4.5 MMOL/L
PROT SERPL-MCNC: 6.9 G/DL
RBC # BLD: 4.62 M/UL
RBC # FLD: 14.2 %
RUBV IGG FLD-ACNC: 2.17 INDEX
RUBV IGG SER-IMP: POSITIVE
SODIUM SERPL-SCNC: 139 MMOL/L
SOURCE AMPLIFICATION: NORMAL
T PALLIDUM AB SER QL IA: NEGATIVE
TRIGL SERPL-MCNC: 75 MG/DL
TSH SERPL-ACNC: 2.07 UIU/ML
VIT B12 SERPL-MCNC: 651 PG/ML
VZV AB TITR SER: POSITIVE
VZV IGG SER IF-ACNC: 8.92 S/CO
WBC # FLD AUTO: 5.95 K/UL

## 2025-06-24 LAB
M TB IFN-G BLD-IMP: NEGATIVE
QUANTIFERON TB PLUS MITOGEN MINUS NIL: >10 IU/ML
QUANTIFERON TB PLUS NIL: 0.02 IU/ML
QUANTIFERON TB PLUS TB1 MINUS NIL: 0 IU/ML
QUANTIFERON TB PLUS TB2 MINUS NIL: 0 IU/ML

## 2025-06-25 LAB
AMPHET UR-MCNC: NORMAL NG/ML
BARBITURATES UR-MCNC: NEGATIVE NG/ML
BENZODIAZ UR-MCNC: NEGATIVE NG/ML
COCAINE METAB.OTHER UR-MCNC: NEGATIVE NG/ML
CREATININE, URINE: 188 MG/DL
FENTANYL, URINE: NEGATIVE NG/ML
METHADONE SCREEN, UR: NEGATIVE NG/ML
OPIATES UR-MCNC: NEGATIVE NG/ML
OXYCODONE/OXYMORPHONE, URINE: NEGATIVE NG/ML
PCP UR-MCNC: NEGATIVE NG/ML
PH, URINE: 7.5
THC UR QL: NEGATIVE NG/ML

## 2025-06-26 ENCOUNTER — APPOINTMENT (OUTPATIENT)
Dept: INTERNAL MEDICINE | Facility: CLINIC | Age: 50
End: 2025-06-26
Payer: COMMERCIAL

## 2025-06-26 ENCOUNTER — NON-APPOINTMENT (OUTPATIENT)
Age: 50
End: 2025-06-26

## 2025-06-26 PROCEDURE — G0010: CPT

## 2025-06-26 PROCEDURE — 90746 HEPB VACCINE 3 DOSE ADULT IM: CPT

## 2025-07-10 ENCOUNTER — APPOINTMENT (OUTPATIENT)
Dept: ORTHOPEDIC SURGERY | Facility: CLINIC | Age: 50
End: 2025-07-10

## 2025-08-01 ENCOUNTER — NON-APPOINTMENT (OUTPATIENT)
Age: 50
End: 2025-08-01

## (undated) DEVICE — TUBING INSUFFLATION LAP FILTER 10FT

## (undated) DEVICE — SHAVER BLADE S&N FULL RADIUS BONECUTTER 5.5MM (ORANGE)

## (undated) DEVICE — TIP METZENBAUM SCISSOR MONOPOLAR ENDOCUT (ORANGE)

## (undated) DEVICE — SUT MONOCRYL 4-0 18" PS-2

## (undated) DEVICE — TUBING IRR SET FOR CYSTOSCOPY 77"

## (undated) DEVICE — DRSG STERISTRIPS 0.5 X 4"

## (undated) DEVICE — PACK KNEE ARTHROSCOPY

## (undated) DEVICE — GLV 8 PROTEXIS (CREAM) NEU-THERA

## (undated) DEVICE — DRSG STERISTRIPS 0.25 X 3"

## (undated) DEVICE — NDL HYPO REGULAR BEVEL 25G X 1.5" (BLUE)

## (undated) DEVICE — TROCAR COVIDIEN VERSAPORT BLADELESS OPTICAL 5MM STANDARD

## (undated) DEVICE — Device

## (undated) DEVICE — PREP BETADINE SPONGE STICKS

## (undated) DEVICE — POSITIONER PINK PAD PIGAZZI SYSTEM

## (undated) DEVICE — SUT FIBERWIRE LOOP 2 STRAIGHT NDL 15"

## (undated) DEVICE — PREP CHLOROHEXIDINE 4% 118CC KIT

## (undated) DEVICE — WARMING BLANKET UPPER ADULT

## (undated) DEVICE — SUT VICRYL 0 27" CT-1 UNDYED

## (undated) DEVICE — ELCTR BOVIE PENCIL SMOKE EVACUATION

## (undated) DEVICE — FLOORMAT STRYKER SUCTION LOW PROFILE 50X34

## (undated) DEVICE — TUBING DEPUY MITEK FMS OUTFLOW

## (undated) DEVICE — DRSG TEGADERM 2.5X3"

## (undated) DEVICE — DRSG MASTISOL

## (undated) DEVICE — PACK GENERAL LAPAROSCOPY

## (undated) DEVICE — SHAVER BLADE S&N FULL RADIUS 3.5MM STRAIGHT (BEIGE)

## (undated) DEVICE — SUT MONOCRYL 4-0 27" PS-2 UNDYED

## (undated) DEVICE — VENODYNE/SCD SLEEVE CALF MEDIUM

## (undated) DEVICE — DRSG DERMABOND 0.7ML

## (undated) DEVICE — PACK PERI GYN

## (undated) DEVICE — SUT VLOC 180 0 9" GS-21 GREEN

## (undated) DEVICE — DRAPE 3/4 SHEET 52X76"

## (undated) DEVICE — SOL IRR POUR NS 0.9% 500ML

## (undated) DEVICE — POSITIONER PATIENT SAFETY STRAP 3X60"

## (undated) DEVICE — PACK D&C

## (undated) DEVICE — SUT VICRYL 2-0 27" CT-2 UNDYED

## (undated) DEVICE — SUT MONOCRYL 3-0 27" PS-2 UNDYED

## (undated) DEVICE — GLV 7 PROTEXIS (CREAM) MICRO

## (undated) DEVICE — GLV 8 PROTEXIS (BLUE)

## (undated) DEVICE — BLADE SURGICAL #15 CARBON

## (undated) DEVICE — SUT ETHIBOND 5 4-30" CCS

## (undated) DEVICE — TUBING HYDRO-SURG PLUS IRRIGATOR W SMOKEVAC & PROBE

## (undated) DEVICE — GLV 7.5 PROTEXIS (WHITE)

## (undated) DEVICE — POSITIONER FOAM EGG CRATE ULNAR 2PCS (PINK)

## (undated) DEVICE — POSITIONER PURPLE ARM ONE STEP (LARGE)

## (undated) DEVICE — SAW BLADE MICROAIRE SAGITTAL 9.4MMX25.4MMX0.6MM

## (undated) DEVICE — D HELP - CLEARVIEW CLEARIFY SYSTEM

## (undated) DEVICE — SOL IRR POUR H2O 500ML

## (undated) DEVICE — SUT NYLON 3-0 18" PS-2

## (undated) DEVICE — LIGASURE MARYLAND 37CM

## (undated) DEVICE — SUT VICRYL 0 36" CT-1 UNDYED

## (undated) DEVICE — TUBING SUCTION NONCONDUCTIVE 6MM X 12FT

## (undated) DEVICE — POSITIONER STRAP ARMBOARD VELCRO TS-30

## (undated) DEVICE — SUT VICRYL 0 18" TIES UNDYED

## (undated) DEVICE — SUT VICRYL 0 27" UR-6

## (undated) DEVICE — ELCTR ROCKER SWITCH PENCIL BLUE 10FT

## (undated) DEVICE — SOL IRR BAG NS 0.9% 3000ML

## (undated) DEVICE — SUT PDS II 2-0 27" CT-1

## (undated) DEVICE — SUT WIRE # 2 TIGERLOOP

## (undated) DEVICE — TUBING DEPUY MITEK FMS INFLOW

## (undated) DEVICE — ARTHREX KIT ACL TRANSTIBIAL WITHOUT SAW BLADE

## (undated) DEVICE — CANNULA S&N CLEAR-TRAC SCREW 7MM

## (undated) DEVICE — FOLEY TRAY 16FR 5CC LF UMETER CLOSED

## (undated) DEVICE — TUBING OLYMPUS INSUFFLATION

## (undated) DEVICE — DEPUY ACL GRAFT KNIFE 10MM

## (undated) DEVICE — TROCAR COVIDIEN VERSAONE FIXATION CANNULA 5MM

## (undated) DEVICE — TROCAR COVIDIEN VERSAONE BLADELESS FIXATION 12MM STANDARD